# Patient Record
Sex: FEMALE | Race: WHITE | Employment: UNEMPLOYED | URBAN - NONMETROPOLITAN AREA
[De-identification: names, ages, dates, MRNs, and addresses within clinical notes are randomized per-mention and may not be internally consistent; named-entity substitution may affect disease eponyms.]

---
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
## 2021-06-02 ENCOUNTER — OFFICE VISIT (OUTPATIENT)
Dept: OBGYN | Age: 21
End: 2021-06-02
Payer: COMMERCIAL

## 2021-06-02 ENCOUNTER — HOSPITAL ENCOUNTER (OUTPATIENT)
Age: 21
Setting detail: SPECIMEN
Discharge: HOME | End: 2021-06-02
Payer: COMMERCIAL

## 2021-06-02 VITALS
DIASTOLIC BLOOD PRESSURE: 72 MMHG | TEMPERATURE: 76 F | SYSTOLIC BLOOD PRESSURE: 114 MMHG | BODY MASS INDEX: 26.46 KG/M2 | OXYGEN SATURATION: 98 % | HEIGHT: 65 IN | WEIGHT: 158.8 LBS

## 2021-06-02 DIAGNOSIS — Z30.9 ENCOUNTER FOR CONTRACEPTIVE MANAGEMENT, UNSPECIFIED TYPE: ICD-10-CM

## 2021-06-02 DIAGNOSIS — Z11.3 ROUTINE SCREENING FOR STI (SEXUALLY TRANSMITTED INFECTION): ICD-10-CM

## 2021-06-02 DIAGNOSIS — N92.0 MENORRHAGIA WITH REGULAR CYCLE: ICD-10-CM

## 2021-06-02 DIAGNOSIS — Z30.9 ENCOUNTER FOR CONTRACEPTIVE MANAGEMENT, UNSPECIFIED TYPE: Primary | ICD-10-CM

## 2021-06-02 DIAGNOSIS — N94.6 DYSMENORRHEA: ICD-10-CM

## 2021-06-02 LAB — HCG(URINE) PREGNANCY TEST: NEGATIVE

## 2021-06-02 PROCEDURE — 99395 PREV VISIT EST AGE 18-39: CPT

## 2021-06-02 PROCEDURE — 87491 CHLMYD TRACH DNA AMP PROBE: CPT

## 2021-06-02 PROCEDURE — 87591 N.GONORRHOEAE DNA AMP PROB: CPT

## 2021-06-02 PROCEDURE — 81025 URINE PREGNANCY TEST: CPT

## 2021-06-02 PROCEDURE — 99203 OFFICE O/P NEW LOW 30 MIN: CPT | Performed by: ADVANCED PRACTICE MIDWIFE

## 2021-06-02 PROCEDURE — 1036F TOBACCO NON-USER: CPT | Performed by: ADVANCED PRACTICE MIDWIFE

## 2021-06-02 PROCEDURE — G8419 CALC BMI OUT NRM PARAM NOF/U: HCPCS | Performed by: ADVANCED PRACTICE MIDWIFE

## 2021-06-02 PROCEDURE — G8427 DOCREV CUR MEDS BY ELIG CLIN: HCPCS | Performed by: ADVANCED PRACTICE MIDWIFE

## 2021-06-02 RX ORDER — DESOGESTREL AND ETHINYL ESTRADIOL 21-5 (28)
1 KIT ORAL DAILY
Qty: 1 PACKET | Refills: 3 | Status: SHIPPED
Start: 2021-06-02 | End: 2022-04-26 | Stop reason: ALTCHOICE

## 2021-06-02 ASSESSMENT — ENCOUNTER SYMPTOMS
EYES NEGATIVE: 1
GASTROINTESTINAL NEGATIVE: 1
RESPIRATORY NEGATIVE: 1

## 2021-06-02 ASSESSMENT — PATIENT HEALTH QUESTIONNAIRE - PHQ9
SUM OF ALL RESPONSES TO PHQ QUESTIONS 1-9: 0
SUM OF ALL RESPONSES TO PHQ9 QUESTIONS 1 & 2: 0
SUM OF ALL RESPONSES TO PHQ QUESTIONS 1-9: 0
2. FEELING DOWN, DEPRESSED OR HOPELESS: 0
SUM OF ALL RESPONSES TO PHQ QUESTIONS 1-9: 0
1. LITTLE INTEREST OR PLEASURE IN DOING THINGS: 0

## 2021-06-02 NOTE — PROGRESS NOTES
Well exam  Desires contraception for Heavy painful cycles  Rates pain at 5 in pelvis   Is sexually active with use of condoms 100 % of the time  Wants to discuss different types of OCP
place, and time. Deep Tendon Reflexes: Reflexes are normal and symmetric. Psychiatric:         Mood and Affect: Mood normal.          Assessment:      Diagnosis Orders   1. Encounter for contraceptive management, unspecified type  Pregnancy, Urine    desogestrel-ethinyl estradiol (KARIVA) 0.15-0.02/0.01 MG (21/5) per tablet   2. Menorrhagia with regular cycle     3. Dysmenorrhea     4. Routine screening for STI (sexually transmitted infection)  C.trachomatis N.gonorrhoeae DNA, Urine           Plan:     Orders Placed This Encounter   Procedures    C.trachomatis N.gonorrhoeae DNA, Urine     Standing Status:   Future     Standing Expiration Date:   6/2/2022    Pregnancy, Urine     Standing Status:   Future     Standing Expiration Date:   8/2/2021      Education: discussed options for menstrual cycle control and dysmenorrhea while also preventing pregnancy. She desires to start OCP's. Will trial low dose OCP's ds'd how to take the pill, missed and late pills and ACHES. Ds'd condom use to protect against STI. Start the pills the Sunday following onset of her next menses. RTO 3 months for PE and medication recheck She and her grandmother agree with plan.

## 2021-06-03 LAB
C. TRACHOMATIS DNA ,URINE: NEGATIVE
N. GONORRHOEAE DNA, URINE: NEGATIVE
SPECIMEN DESCRIPTION: NORMAL

## 2021-06-28 ENCOUNTER — HOSPITAL ENCOUNTER (OUTPATIENT)
Dept: LAB | Age: 21
Discharge: HOME | End: 2021-06-28
Payer: COMMERCIAL

## 2021-06-28 DIAGNOSIS — Z34.90 EARLY STAGE OF PREGNANCY: Primary | ICD-10-CM

## 2021-06-28 DIAGNOSIS — N92.6 MISSED MENSES: Primary | ICD-10-CM

## 2021-06-28 DIAGNOSIS — N92.6 MISSED MENSES: ICD-10-CM

## 2021-06-28 LAB — HCG QUANTITATIVE: ABNORMAL IU/L

## 2021-06-28 PROCEDURE — 36415 COLL VENOUS BLD VENIPUNCTURE: CPT

## 2021-06-28 PROCEDURE — 84702 CHORIONIC GONADOTROPIN TEST: CPT

## 2021-06-28 NOTE — RESULT ENCOUNTER NOTE
Please notify patient lab results WNL. Please schedule as initial and dating US prior to appt. Needs full bladder. .  Orders placed.  NOBLE/HELEN

## 2021-06-30 ENCOUNTER — HOSPITAL ENCOUNTER (OUTPATIENT)
Dept: LAB | Age: 21
Discharge: HOME | End: 2021-06-30
Payer: COMMERCIAL

## 2021-06-30 ENCOUNTER — HOSPITAL ENCOUNTER (OUTPATIENT)
Dept: ULTRASOUND IMAGING | Age: 21
Discharge: HOME | End: 2021-07-02
Payer: COMMERCIAL

## 2021-06-30 ENCOUNTER — INITIAL PRENATAL (OUTPATIENT)
Dept: OBGYN | Age: 21
End: 2021-06-30
Payer: COMMERCIAL

## 2021-06-30 VITALS
BODY MASS INDEX: 25.96 KG/M2 | DIASTOLIC BLOOD PRESSURE: 64 MMHG | HEART RATE: 84 BPM | SYSTOLIC BLOOD PRESSURE: 110 MMHG | WEIGHT: 157.2 LBS

## 2021-06-30 DIAGNOSIS — O24.019 PRE-EXISTING TYPE 1 DIABETES MELLITUS DURING PREGNANCY, ANTEPARTUM: ICD-10-CM

## 2021-06-30 DIAGNOSIS — O99.280 HYPOTHYROID IN PREGNANCY, ANTEPARTUM: ICD-10-CM

## 2021-06-30 DIAGNOSIS — E03.9 HYPOTHYROID IN PREGNANCY, ANTEPARTUM: ICD-10-CM

## 2021-06-30 DIAGNOSIS — Z34.90 EARLY STAGE OF PREGNANCY: Primary | ICD-10-CM

## 2021-06-30 DIAGNOSIS — Z3A.01 6 WEEKS GESTATION OF PREGNANCY: ICD-10-CM

## 2021-06-30 DIAGNOSIS — Z34.90 EARLY STAGE OF PREGNANCY: ICD-10-CM

## 2021-06-30 LAB
-: ABNORMAL
ABO/RH: NORMAL
ABSOLUTE EOS #: 0.04 K/UL (ref 0–0.44)
ABSOLUTE IMMATURE GRANULOCYTE: 0.03 K/UL (ref 0–0.3)
ABSOLUTE LYMPH #: 2.08 K/UL (ref 1.2–5.2)
ABSOLUTE MONO #: 0.57 K/UL (ref 0.1–1.4)
ALBUMIN SERPL-MCNC: 4.5 G/DL (ref 3.5–5.2)
ALBUMIN/GLOBULIN RATIO: 1.5 (ref 1–2.5)
ALP BLD-CCNC: 84 U/L (ref 35–104)
ALT SERPL-CCNC: 11 U/L (ref 5–33)
AMORPHOUS: ABNORMAL
AMPHETAMINE SCREEN URINE: NEGATIVE
ANION GAP SERPL CALCULATED.3IONS-SCNC: 10 MMOL/L (ref 9–17)
ANTIBODY SCREEN: NEGATIVE
AST SERPL-CCNC: 14 U/L
BACTERIA: ABNORMAL
BARBITURATE SCREEN URINE: NEGATIVE
BASOPHILS # BLD: 1 % (ref 0–2)
BASOPHILS ABSOLUTE: 0.04 K/UL (ref 0–0.2)
BENZODIAZEPINE SCREEN, URINE: NEGATIVE
BILIRUB SERPL-MCNC: 0.6 MG/DL (ref 0.3–1.2)
BILIRUBIN URINE: NEGATIVE
BUN BLDV-MCNC: 9 MG/DL (ref 6–20)
BUN/CREAT BLD: 20 (ref 9–20)
BUPRENORPHINE URINE: NEGATIVE
CALCIUM SERPL-MCNC: 9.4 MG/DL (ref 8.6–10.4)
CANNABINOID SCREEN URINE: POSITIVE
CASTS UA: ABNORMAL /LPF (ref 0–2)
CHLORIDE BLD-SCNC: 103 MMOL/L (ref 98–107)
CO2: 23 MMOL/L (ref 20–31)
COCAINE METABOLITE, URINE: NEGATIVE
COLOR: ABNORMAL
COMMENT UA: ABNORMAL
CREAT SERPL-MCNC: 0.45 MG/DL (ref 0.5–0.9)
CRYSTALS, UA: ABNORMAL /HPF
DIFFERENTIAL TYPE: ABNORMAL
EOSINOPHILS RELATIVE PERCENT: 1 % (ref 1–4)
EPITHELIAL CELLS UA: ABNORMAL /HPF (ref 0–5)
GFR AFRICAN AMERICAN: ABNORMAL ML/MIN
GFR NON-AFRICAN AMERICAN: ABNORMAL ML/MIN
GFR SERPL CREATININE-BSD FRML MDRD: ABNORMAL ML/MIN/{1.73_M2}
GFR SERPL CREATININE-BSD FRML MDRD: ABNORMAL ML/MIN/{1.73_M2}
GLUCOSE BLD-MCNC: 193 MG/DL (ref 70–99)
GLUCOSE URINE: NEGATIVE
HCT VFR BLD CALC: 43.3 % (ref 36.3–47.1)
HEMOGLOBIN: 14.7 G/DL (ref 11.9–15.1)
HEPATITIS B SURFACE ANTIGEN: NONREACTIVE
HIV AG/AB: NONREACTIVE
IMMATURE GRANULOCYTES: 0 %
KETONES, URINE: ABNORMAL
LEUKOCYTE ESTERASE, URINE: ABNORMAL
LYMPHOCYTES # BLD: 28 % (ref 25–45)
MCH RBC QN AUTO: 30.1 PG (ref 25.2–33.5)
MCHC RBC AUTO-ENTMCNC: 33.9 G/DL (ref 25.2–33.5)
MCV RBC AUTO: 88.5 FL (ref 82.6–102.9)
MDMA URINE: ABNORMAL
METHADONE SCREEN, URINE: NEGATIVE
METHAMPHETAMINE, URINE: NEGATIVE
MONOCYTES # BLD: 8 % (ref 2–8)
MUCUS: ABNORMAL
NITRITE, URINE: NEGATIVE
NRBC AUTOMATED: 0 PER 100 WBC
OPIATES, URINE: NEGATIVE
OTHER OBSERVATIONS UA: ABNORMAL
OXYCODONE SCREEN URINE: NEGATIVE
PDW BLD-RTO: 11.8 % (ref 11.8–14.4)
PH UA: 5.5 (ref 5–6)
PHENCYCLIDINE, URINE: NEGATIVE
PLATELET # BLD: 396 K/UL (ref 138–453)
PLATELET ESTIMATE: ABNORMAL
PMV BLD AUTO: 9.6 FL (ref 8.1–13.5)
POTASSIUM SERPL-SCNC: 4.1 MMOL/L (ref 3.7–5.3)
PROPOXYPHENE, URINE: NEGATIVE
PROTEIN UA: NEGATIVE
RBC # BLD: 4.89 M/UL (ref 3.95–5.11)
RBC # BLD: ABNORMAL 10*6/UL
RBC UA: ABNORMAL /HPF (ref 0–4)
RENAL EPITHELIAL, UA: ABNORMAL /HPF
RUBV IGG SER QL: 116.4 IU/ML
SEG NEUTROPHILS: 62 % (ref 34–64)
SEGMENTED NEUTROPHILS ABSOLUTE COUNT: 4.58 K/UL (ref 1.8–8)
SODIUM BLD-SCNC: 136 MMOL/L (ref 135–144)
SPECIFIC GRAVITY UA: 1.03 (ref 1.01–1.02)
T. PALLIDUM, IGG: NONREACTIVE
TEST INFORMATION: ABNORMAL
THYROXINE, FREE: 1.62 NG/DL (ref 0.93–1.7)
TOTAL PROTEIN: 7.5 G/DL (ref 6.4–8.3)
TRICHOMONAS: ABNORMAL
TRICYCLIC ANTIDEPRESSANTS, UR: NEGATIVE
TSH SERPL DL<=0.05 MIU/L-ACNC: 1.15 MIU/L (ref 0.3–5)
TURBIDITY: ABNORMAL
URINE HGB: NEGATIVE
UROBILINOGEN, URINE: NORMAL
WBC # BLD: 7.3 K/UL (ref 4.5–13.5)
WBC # BLD: ABNORMAL 10*3/UL
WBC UA: ABNORMAL /HPF (ref 0–4)
YEAST: ABNORMAL

## 2021-06-30 PROCEDURE — 86762 RUBELLA ANTIBODY: CPT

## 2021-06-30 PROCEDURE — 3046F HEMOGLOBIN A1C LEVEL >9.0%: CPT | Performed by: ADVANCED PRACTICE MIDWIFE

## 2021-06-30 PROCEDURE — 87086 URINE CULTURE/COLONY COUNT: CPT

## 2021-06-30 PROCEDURE — 87389 HIV-1 AG W/HIV-1&-2 AB AG IA: CPT

## 2021-06-30 PROCEDURE — 84439 ASSAY OF FREE THYROXINE: CPT

## 2021-06-30 PROCEDURE — 99213 OFFICE O/P EST LOW 20 MIN: CPT | Performed by: ADVANCED PRACTICE MIDWIFE

## 2021-06-30 PROCEDURE — 87591 N.GONORRHOEAE DNA AMP PROB: CPT

## 2021-06-30 PROCEDURE — 81001 URINALYSIS AUTO W/SCOPE: CPT

## 2021-06-30 PROCEDURE — 84443 ASSAY THYROID STIM HORMONE: CPT

## 2021-06-30 PROCEDURE — 86850 RBC ANTIBODY SCREEN: CPT

## 2021-06-30 PROCEDURE — 80306 DRUG TEST PRSMV INSTRMNT: CPT

## 2021-06-30 PROCEDURE — 87340 HEPATITIS B SURFACE AG IA: CPT

## 2021-06-30 PROCEDURE — 80053 COMPREHEN METABOLIC PANEL: CPT

## 2021-06-30 PROCEDURE — 86900 BLOOD TYPING SEROLOGIC ABO: CPT

## 2021-06-30 PROCEDURE — 99203 OFFICE O/P NEW LOW 30 MIN: CPT | Performed by: ADVANCED PRACTICE MIDWIFE

## 2021-06-30 PROCEDURE — 87491 CHLMYD TRACH DNA AMP PROBE: CPT

## 2021-06-30 PROCEDURE — G8427 DOCREV CUR MEDS BY ELIG CLIN: HCPCS | Performed by: ADVANCED PRACTICE MIDWIFE

## 2021-06-30 PROCEDURE — 76801 OB US < 14 WKS SINGLE FETUS: CPT

## 2021-06-30 PROCEDURE — 2022F DILAT RTA XM EVC RTNOPTHY: CPT | Performed by: ADVANCED PRACTICE MIDWIFE

## 2021-06-30 PROCEDURE — 83036 HEMOGLOBIN GLYCOSYLATED A1C: CPT

## 2021-06-30 PROCEDURE — 86901 BLOOD TYPING SEROLOGIC RH(D): CPT

## 2021-06-30 PROCEDURE — 36415 COLL VENOUS BLD VENIPUNCTURE: CPT

## 2021-06-30 PROCEDURE — 76817 TRANSVAGINAL US OBSTETRIC: CPT

## 2021-06-30 PROCEDURE — 1036F TOBACCO NON-USER: CPT | Performed by: ADVANCED PRACTICE MIDWIFE

## 2021-06-30 PROCEDURE — 86780 TREPONEMA PALLIDUM: CPT

## 2021-06-30 PROCEDURE — G8419 CALC BMI OUT NRM PARAM NOF/U: HCPCS | Performed by: ADVANCED PRACTICE MIDWIFE

## 2021-06-30 PROCEDURE — 85025 COMPLETE CBC W/AUTO DIFF WBC: CPT

## 2021-06-30 PROCEDURE — 86787 VARICELLA-ZOSTER ANTIBODY: CPT

## 2021-06-30 NOTE — PROGRESS NOTES
S:  Presents for prenatal visit today w/FOB. Reports was going to start OCP's and period never started. Unplanned pregnancy, but okay with it. O:  MVSS, Afebrile. Abodmen gravid, US = 6 Weeks 1 Day = EDC 2021, . A:  23 yo  at Early Stage of Pregnancy, Pre-Existing DM I, +FHT's  P:  Education: will obtain labs, encouraged eye exam, educational pamphlet given, RTO 2-3 weeks will do education, PE and MFM referral at that time. Currently seeing endocrinology at [facility].  Checking FBS and Q3H during daytime. Thirty-five minutes spent in interview and education.

## 2021-06-30 NOTE — LETTER
[address] OB GYN A department of [place] [address]  Phone: [phone]  Fax: MARIA D Mead CNM        June 30, 2021      To Whom It May Concern,    Andre Dixon is currently 6 weeks and 4 days pregnant. Andre's due date is 2/19/2022.        Sincerely,        MARIA D Roy CNM

## 2021-07-01 LAB
C. TRACHOMATIS DNA ,URINE: NEGATIVE
ESTIMATED AVERAGE GLUCOSE: 189 MG/DL
HBA1C MFR BLD: 8.2 % (ref 4–6)
N. GONORRHOEAE DNA, URINE: NEGATIVE
SPECIMEN DESCRIPTION: NORMAL

## 2021-07-02 LAB
CULTURE: NORMAL
Lab: NORMAL
SPECIMEN DESCRIPTION: NORMAL
VZV IGG SER QL IA: 1.67

## 2021-07-27 ENCOUNTER — ROUTINE PRENATAL (OUTPATIENT)
Dept: OBGYN | Age: 21
End: 2021-07-27
Payer: COMMERCIAL

## 2021-07-27 ENCOUNTER — HOSPITAL ENCOUNTER (OUTPATIENT)
Dept: ULTRASOUND IMAGING | Age: 21
Discharge: HOME | End: 2021-07-29
Payer: COMMERCIAL

## 2021-07-27 VITALS
DIASTOLIC BLOOD PRESSURE: 62 MMHG | WEIGHT: 156 LBS | SYSTOLIC BLOOD PRESSURE: 104 MMHG | BODY MASS INDEX: 25.76 KG/M2 | HEART RATE: 80 BPM

## 2021-07-27 DIAGNOSIS — Z34.90 PRENATAL CARE, ANTEPARTUM: Primary | ICD-10-CM

## 2021-07-27 DIAGNOSIS — O24.019 PRE-EXISTING TYPE 1 DIABETES MELLITUS DURING PREGNANCY, ANTEPARTUM: ICD-10-CM

## 2021-07-27 DIAGNOSIS — Z34.90 EARLY STAGE OF PREGNANCY: ICD-10-CM

## 2021-07-27 DIAGNOSIS — Z3A.10 10 WEEKS GESTATION OF PREGNANCY: ICD-10-CM

## 2021-07-27 PROCEDURE — 76815 OB US LIMITED FETUS(S): CPT

## 2021-07-27 PROCEDURE — G8427 DOCREV CUR MEDS BY ELIG CLIN: HCPCS | Performed by: ADVANCED PRACTICE MIDWIFE

## 2021-07-27 PROCEDURE — 3052F HG A1C>EQUAL 8.0%<EQUAL 9.0%: CPT | Performed by: ADVANCED PRACTICE MIDWIFE

## 2021-07-27 PROCEDURE — 1036F TOBACCO NON-USER: CPT | Performed by: ADVANCED PRACTICE MIDWIFE

## 2021-07-27 PROCEDURE — G8419 CALC BMI OUT NRM PARAM NOF/U: HCPCS | Performed by: ADVANCED PRACTICE MIDWIFE

## 2021-07-27 PROCEDURE — 99213 OFFICE O/P EST LOW 20 MIN: CPT | Performed by: ADVANCED PRACTICE MIDWIFE

## 2021-07-27 PROCEDURE — 2022F DILAT RTA XM EVC RTNOPTHY: CPT | Performed by: ADVANCED PRACTICE MIDWIFE

## 2021-07-27 PROCEDURE — 99214 OFFICE O/P EST MOD 30 MIN: CPT | Performed by: ADVANCED PRACTICE MIDWIFE

## 2021-07-27 RX ORDER — FAMOTIDINE 20 MG
1 TABLET ORAL DAILY
Qty: 30 TABLET | Refills: 12 | Status: SHIPPED | OUTPATIENT
Start: 2021-07-27 | End: 2022-03-22 | Stop reason: ALTCHOICE

## 2021-07-27 NOTE — PROGRESS NOTES
S:  Presents for dating US. Reports morning sickness, some vomiting. She and her mother state that she has an appt. w/MFM at [address] this Thursday. O:  MVSS, Afebrile. Abdomen gravid, nontender. US = 10 Weeks 3 Days = EDCC 2022 CWD, . PE Deferred  A:  23 yo  at 10 Weeks 3 Days sIUP, Pre-Existing DM I, +FHT's  P:  Education: discussed complete transfer of care, keep appt. this upcoming Thursday, PNV presription sent in and referral for eye exam. Referral for MFM placed. Twenty minutes spent in discussion of plan of care and review of [address].

## 2021-08-31 ENCOUNTER — OFFICE VISIT (OUTPATIENT)
Dept: OPTOMETRY | Age: 21
End: 2021-08-31
Payer: COMMERCIAL

## 2021-08-31 DIAGNOSIS — H52.203 HYPEROPIA OF BOTH EYES WITH ASTIGMATISM: ICD-10-CM

## 2021-08-31 DIAGNOSIS — H52.03 HYPEROPIA OF BOTH EYES WITH ASTIGMATISM: ICD-10-CM

## 2021-08-31 DIAGNOSIS — H35.9 MACULOPATHY: ICD-10-CM

## 2021-08-31 DIAGNOSIS — E10.69 TYPE 1 DIABETES MELLITUS WITH OTHER SPECIFIED COMPLICATION (HCC): Primary | ICD-10-CM

## 2021-08-31 PROCEDURE — 2022F DILAT RTA XM EVC RTNOPTHY: CPT | Performed by: OPTOMETRIST

## 2021-08-31 PROCEDURE — 92014 COMPRE OPH EXAM EST PT 1/>: CPT | Performed by: OPTOMETRIST

## 2021-08-31 PROCEDURE — G8419 CALC BMI OUT NRM PARAM NOF/U: HCPCS | Performed by: OPTOMETRIST

## 2021-08-31 PROCEDURE — 92015 DETERMINE REFRACTIVE STATE: CPT | Performed by: OPTOMETRIST

## 2021-08-31 PROCEDURE — G8427 DOCREV CUR MEDS BY ELIG CLIN: HCPCS | Performed by: OPTOMETRIST

## 2021-08-31 PROCEDURE — 92250 FUNDUS PHOTOGRAPHY W/I&R: CPT | Performed by: OPTOMETRIST

## 2021-08-31 PROCEDURE — 1036F TOBACCO NON-USER: CPT | Performed by: OPTOMETRIST

## 2021-08-31 RX ORDER — TROPICAMIDE 10 MG/ML
1 SOLUTION/ DROPS OPHTHALMIC ONCE
Status: DISCONTINUED | OUTPATIENT
Start: 2021-08-31 | End: 2021-08-31

## 2021-08-31 ASSESSMENT — VISUAL ACUITY
OS_CC: 20/25
CORRECTION_TYPE: GLASSES
METHOD: SNELLEN - LINEAR

## 2021-08-31 ASSESSMENT — TONOMETRY
IOP_METHOD: NON-CONTACT AIR PUFF
OD_IOP_MMHG: 10
OS_IOP_MMHG: 13

## 2021-08-31 ASSESSMENT — REFRACTION_WEARINGRX
OD_SPHERE: +2.00
OS_AXIS: 180
SPECS_TYPE: SVL
OS_SPHERE: +2.25
OD_CYLINDER: -1.00
OS_CYLINDER: -2.25
OD_AXIS: 007

## 2021-08-31 ASSESSMENT — ENCOUNTER SYMPTOMS
GASTROINTESTINAL NEGATIVE: 0
EYES NEGATIVE: 0
RESPIRATORY NEGATIVE: 0
ALLERGIC/IMMUNOLOGIC NEGATIVE: 0

## 2021-08-31 ASSESSMENT — KERATOMETRY
OD_AXISANGLE_DEGREES: 095
METHOD_AUTO_MANUAL: AUTOMATED
OS_K2POWER_DIOPTERS: 43.50
OD_K2POWER_DIOPTERS: 42.75
OS_AXISANGLE2_DEGREES: 002
OD_AXISANGLE2_DEGREES: 005
OS_AXISANGLE_DEGREES: 092
OD_K1POWER_DIOPTERS: 40.75
OS_K1POWER_DIOPTERS: 40.25

## 2021-08-31 ASSESSMENT — REFRACTION_MANIFEST
OD_CYLINDER: -1.00
OS_CYLINDER: -2.50
OS_AXIS: 001
OD_SPHERE: +1.75
OS_SPHERE: +2.25
OD_AXIS: 002

## 2021-08-31 ASSESSMENT — SLIT LAMP EXAM - LIDS
COMMENTS: NORMAL
COMMENTS: NORMAL

## 2021-08-31 NOTE — PROGRESS NOTES
Kilo Lagos presents today for   Chief Complaint   Patient presents with    Blurred Vision    Ophth Diabetic Exam    Vision Exam   .    HPI     Blurred Vision     In both eyes. Vision is blurred. Context:  distance vision.               Comments     Last Vision Exam: 7/6/2020 Aw  Last Ophthalmology Exam: n/a  Last Filled Glasses Rx: 7/6/2020  Insurance: [employer]  Update: Dm Exam; Glasses   Distance is getting more blurry  Currently 15 weeks pregnant  Diabetic:  Sugars: 262 this am   HmgA1C: 8.2  7/1/2021                   Current Outpatient Medications   Medication Sig Dispense Refill    Prenatal Vit-Fe Fumarate-FA (PRENATAL COMPLETE) 14-0.4 MG TABS Take 1 tablet by mouth daily 30 tablet 12    levothyroxine (SYNTHROID) 100 MCG tablet Take 100 mcg by mouth Daily      BASAGLAR KWIKPEN 100 UNIT/ML injection pen Inject 53 Units into the skin nightly Uses 58 units nightly during menses      B-D INS SYR ULTRAFINE 1CC/31G 31G X 5/16\" 1 ML MISC       TRUETEST TEST strip TEST SIX TO EIGHT TIMES DAILY 200 each 0    acetone, urine, test (KETOSTIX) strip Test daily as needed for ketones 20 strip 0    cetirizine (ZYRTEC) 10 MG tablet TAKE ONE TABLET BY MOUTH EVERY DAY 30 tablet 6    Insulin Lispro, Human, (HUMALOG KWIKPEN) 100 UNIT/ML SOPN As directed for 3-4 injections/day up to 40 units daily (Patient taking differently: Sliding scale as directed for 3-4 injections/day up to 40 units daily) 5 Pen 6    Insulin Pen Needle (B-D ULTRAFINE III SHORT PEN) 31G X 8 MM MISC Sig:Use as directed for injections 4 - 6 times per day 200 each 6    ULTRA-COMFORT INSULIN SYRINGE 31G X 5/16\" 0.5 ML MISC USE ONCE DAILY WITH LANTUS 30 each 3    glucagon (GLUCAGON EMERGENCY) 1 MG injection As directed for extreme hypoglycemia 1 kit 2    Lancets (BD LANCET ULTRAFINE 30G) MISC Disp: # 200  Sig: For blood testing 4-6 times/day 200 each 11    desogestrel-ethinyl estradiol (KARIVA) 0.15-0.02/0.01 MG (21/5) per tablet Take 1 tablet by mouth daily (Patient not taking: Reported on 6/30/2021) 1 packet 3     No current facility-administered medications for this visit. ROS     Positive for: Endocrine    Negative for: Constitutional, Gastrointestinal, Neurological, Skin, Genitourinary, Musculoskeletal, HENT, Cardiovascular, Eyes, Respiratory, Psychiatric, Allergic/Imm, Heme/Lymph          Family History   Problem Relation Age of Onset    Diabetes Maternal Grandfather     Retinal Detachment Maternal Grandfather     Diabetes Father         borderline    Neuropathy Father     Heart Disease Paternal Grandmother     Asthma Paternal Grandmother     Other Paternal Grandmother         edema and history of hemophilia    Cancer Maternal Grandmother 72        breast cancer    Hypertension Other         paternal side    Diabetes Other         paternal side     Glaucoma Neg Hx     Cataracts Neg Hx      Social History     Socioeconomic History    Marital status: Single     Spouse name: None    Number of children: None    Years of education: None    Highest education level: None   Occupational History     Employer: N/A   Tobacco Use    Smoking status: Passive Smoke Exposure - Never Smoker    Smokeless tobacco: Never Used    Tobacco comment: Passive smoke exposure. Substance and Sexual Activity    Alcohol use: No     Alcohol/week: 0.0 standard drinks    Drug use: Yes     Types: Marijuana     Comment: last use 6/26, stopped    Sexual activity: Yes     Partners: Male     Birth control/protection: Pill, Condom   Other Topics Concern    None   Social History Narrative    ** Merged History Encounter **          Social Determinants of Health     Financial Resource Strain:     Difficulty of Paying Living Expenses:    Food Insecurity:     Worried About Running Out of Food in the Last Year:     Ran Out of Food in the Last Year:    Transportation Needs:     Lack of Transportation (Medical):      Lack of Transportation (Non-Medical): K1 Axis K2 Axis    Right 40.75 005 42.75 095    Left 40.25 002 43.50 092              Pupils     Pupils       Pupils    Right PERRL    Left PERRL               Not recorded         Not recorded          Ophthalmology Exam     Wearing Rx       Sphere Cylinder Axis    Right +2.00 -1.00 007    Left +2.25 -2.25 180    Age: 2yr    Type: SVL                Manifest Refraction     Manifest Refraction       Sphere Cylinder Axis Dist VA    Right +1.75 -1.00 002 20/20    Left +2.25 -2.50 001 20/20          Manifest Refraction #2 (Auto)       Sphere Cylinder Axis Dist VA    Right +1.75 -1.50 001     Left +2.50 -2.50 003                Final Rx       Sphere Cylinder Axis    Right +1.75 -1.00 002    Left +2.25 -2.50 001    Type: SVL    Expiration Date: 9/1/2023          Neuro/Psych     Neuro/Psych     Oriented x3: Yes    Mood/Affect: Normal                Orders Placed This Encounter   Procedures    HM DIABETES EYE EXAM    Color Fundus Photography-OU-Both Eyes       IMPRESSION:  1. Type 1 diabetes mellitus with other specified complication ([address]    2. Hyperopia of both eyes with astigmatism    3. Maculopathy        PLAN:    Discussed the patient's diagnosis of diabetes and the impact this can have on their ocular health, potentially even leading to permanent blindness. I discussed with the patient the importance of continued follow-up and management with their primary care physician to control their glycemic, blood pressure, and lipid levels. The patient verbalized understanding. 2. New glasses recommended   3. Recheck optos in one month to check for any changes as the left eye shows some exudate near NAVJOT       Glycemic control as per PCP   Patient Instructions   Keep blood sugar level under control and we will check the retina in one month      Return in about 1 month (around 9/30/2021) for recheck maculae due to change in optos (repeat optos and oct if needed) do not dilate .

## 2021-09-10 ENCOUNTER — OFFICE VISIT (OUTPATIENT)
Dept: FAMILY MEDICINE CLINIC | Age: 21
End: 2021-09-10
Payer: COMMERCIAL

## 2021-09-10 VITALS
HEART RATE: 96 BPM | RESPIRATION RATE: 12 BRPM | WEIGHT: 159.9 LBS | HEIGHT: 65 IN | BODY MASS INDEX: 26.64 KG/M2 | DIASTOLIC BLOOD PRESSURE: 62 MMHG | TEMPERATURE: 96.9 F | OXYGEN SATURATION: 98 % | SYSTOLIC BLOOD PRESSURE: 104 MMHG

## 2021-09-10 DIAGNOSIS — Z3A.16 16 WEEKS GESTATION OF PREGNANCY: ICD-10-CM

## 2021-09-10 DIAGNOSIS — Z00.00 ROUTINE GENERAL MEDICAL EXAMINATION AT A HEALTH CARE FACILITY: Primary | ICD-10-CM

## 2021-09-10 DIAGNOSIS — E10.9 TYPE 1 DIABETES MELLITUS WITHOUT COMPLICATION (HCC): ICD-10-CM

## 2021-09-10 PROCEDURE — 99395 PREV VISIT EST AGE 18-39: CPT | Performed by: FAMILY MEDICINE

## 2021-09-10 RX ORDER — ASPIRIN 81 MG/1
81 TABLET, CHEWABLE ORAL DAILY
COMMUNITY
End: 2022-02-28

## 2021-09-10 SDOH — ECONOMIC STABILITY: FOOD INSECURITY: WITHIN THE PAST 12 MONTHS, THE FOOD YOU BOUGHT JUST DIDN'T LAST AND YOU DIDN'T HAVE MONEY TO GET MORE.: NEVER TRUE

## 2021-09-10 SDOH — ECONOMIC STABILITY: FOOD INSECURITY: WITHIN THE PAST 12 MONTHS, YOU WORRIED THAT YOUR FOOD WOULD RUN OUT BEFORE YOU GOT MONEY TO BUY MORE.: NEVER TRUE

## 2021-09-10 ASSESSMENT — ENCOUNTER SYMPTOMS
CONSTIPATION: 0
SORE THROAT: 0
SHORTNESS OF BREATH: 0
WHEEZING: 0
COUGH: 0
DIARRHEA: 0
VOMITING: 0
ABDOMINAL PAIN: 0
TROUBLE SWALLOWING: 0
EYE REDNESS: 0
SINUS PRESSURE: 0
EYE DISCHARGE: 0
RHINORRHEA: 0
NAUSEA: 0

## 2021-09-10 ASSESSMENT — SOCIAL DETERMINANTS OF HEALTH (SDOH): HOW HARD IS IT FOR YOU TO PAY FOR THE VERY BASICS LIKE FOOD, HOUSING, MEDICAL CARE, AND HEATING?: NOT HARD AT ALL

## 2021-09-10 NOTE — PROGRESS NOTES
9/10/2021     Andre Diaz (:  2000) is a 24 y.o. female, here for evaluation of the following medical concerns:    HPI  Patient comes in today for routine general physical exam.  Patient is 16 weeks pregnant. Is working with the OB/GYN and maternal-fetal medicine specialist regarding management of her diabetes during pregnancy. Blood sugars have been fluctuating quite a bit but this has been pretty common throughout most of her life. She has had to increase her insulin quite a bit since being pregnant. Will likely be admitted tomorrow to try to stabilize her blood sugar control. She feels relatively good otherwise. Is having some nausea. Does try to eat relatively healthy. Does not have any other acute medical concerns to discuss. Did review patient's med list, allergies, social history, fam history, pmhx and pshx today as noted in the record. Review of Systems   Constitutional: Negative for chills, fatigue and fever. HENT: Negative for congestion, ear pain, postnasal drip, rhinorrhea, sinus pressure, sore throat and trouble swallowing. Eyes: Negative for discharge and redness. Respiratory: Negative for cough, shortness of breath and wheezing. Cardiovascular: Negative for chest pain. Gastrointestinal: Negative for abdominal pain, constipation, diarrhea, nausea and vomiting. Genitourinary: Negative for dysuria, flank pain, frequency and urgency. Musculoskeletal: Negative for arthralgias, myalgias and neck pain. Skin: Negative for rash and wound. Allergic/Immunologic: Negative for environmental allergies. Neurological: Negative for dizziness, weakness, light-headedness and headaches. Hematological: Negative for adenopathy. Psychiatric/Behavioral: Negative. Prior to Visit Medications    Medication Sig Taking?  Authorizing Provider   aspirin 81 MG chewable tablet Take 81 mg by mouth daily Yes Historical Provider, MD   Prenatal Vit-Fe Fumarate-FA (PRENATAL COMPLETE) 14-0.4 MG TABS Take 1 tablet by mouth daily Yes MARIA D Nielson CNM   levothyroxine (SYNTHROID) 100 MCG tablet Take 100 mcg by mouth Daily Yes Historical Provider, MD Donis Flores 100 UNIT/ML injection pen Inject 53 Units into the skin nightly Uses 58 units nightly during menses Yes Historical Provider, MD KENNEDY INS SYR ULTRAFINE 1CC/31G 31G X 5/16\" 1 ML MISC  Yes Historical Provider, MD   TRUETEST TEST strip TEST SIX TO EIGHT TIMES DAILY Yes Aníbal Bergman MD   acetone, urine, test (KETOSTIX) strip Test daily as needed for ketones Yes Marycruz Shirley MD   cetirizine (ZYRTEC) 10 MG tablet TAKE ONE TABLET BY MOUTH EVERY DAY Yes EILEEN Danielle   Insulin Lispro, Human, (HUMALOG KWIKPEN) 100 UNIT/ML SOPN As directed for 3-4 injections/day up to 40 units daily  Patient taking differently: Sliding scale as directed for 3-4 injections/day up to 40 units daily Yes Eloise NEWBY MD   Insulin Pen Needle (GADIEL-LORRAINE ULTRAFINE III SHORT PEN) 31G X 8 MM MISC Sig:Use as directed for injections 4 - 6 times per day Yes Celia Watts MD   ULTRA-COMFORT INSULIN SYRINGE 31G X 5/16\" 0.5 ML MISC USE ONCE DAILY WITH LANTUS Yes Jayson Payan MD   glucagon (GLUCAGON EMERGENCY) 1 MG injection As directed for extreme hypoglycemia Yes Jayson Payan MD   Lancets (BD LANCET ULTRAFINE 30G) MISC Disp: # 200  Sig: For blood testing 4-6 times/day Yes Jayson Payan MD   desogestrel-ethinyl estradiol ([place]) 0.15-0.02/0.01 MG (21/5) per tablet Take 1 tablet by mouth daily  Patient not taking: Reported on 6/30/2021  MARIA D Nielson CNM        Social History     Tobacco Use    Smoking status: Passive Smoke Exposure - Never Smoker    Smokeless tobacco: Never Used    Tobacco comment: Passive smoke exposure.    Substance Use Topics    Alcohol use: No     Alcohol/week: 0.0 standard drinks        Vitals:    09/10/21 1517   BP: 104/62   Site: Left Upper Arm   Position: Sitting   Cuff Size: Medium Adult Pulse: 96   Resp: 12   Temp: 96.9 °F (36.1 °C)   TempSrc: Temporal   SpO2: 98%   Weight: 159 lb 14.4 oz (72.5 kg)   Height: 5' 5.25\" (1.657 m)     Estimated body mass index is 26.41 kg/m² as calculated from the following:    Height as of this encounter: 5' 5.25\" (1.657 m). Weight as of this encounter: 159 lb 14.4 oz (72.5 kg). Physical Exam  Vitals and nursing note reviewed. Constitutional:       General: She is not in acute distress. Appearance: Normal appearance. She is well-developed. She is not diaphoretic. HENT:      Head: Normocephalic and atraumatic. Right Ear: Tympanic membrane, ear canal and external ear normal.      Left Ear: Tympanic membrane, ear canal and external ear normal.      Nose: Nose normal.      Mouth/Throat:      Mouth: Mucous membranes are moist.      Pharynx: Oropharynx is clear. No oropharyngeal exudate. Eyes:      General:         Right eye: No discharge. Left eye: No discharge. Conjunctiva/sclera: Conjunctivae normal.      Pupils: Pupils are equal, round, and reactive to light. Neck:      Thyroid: No thyromegaly. Cardiovascular:      Rate and Rhythm: Normal rate and regular rhythm. Heart sounds: Normal heart sounds. Pulmonary:      Effort: Pulmonary effort is normal.      Breath sounds: Normal breath sounds. No wheezing or rales. Abdominal:      General: Bowel sounds are normal. There is distension (gravid uterus). Palpations: Abdomen is soft. Tenderness: There is no abdominal tenderness. Musculoskeletal:      Cervical back: Normal range of motion and neck supple. Lymphadenopathy:      Cervical: No cervical adenopathy. Skin:     General: Skin is warm and dry. Findings: No rash. Neurological:      Mental Status: She is alert and oriented to person, place, and time. Psychiatric:         Behavior: Behavior normal.         Thought Content:  Thought content normal.         Judgment: Judgment normal. ASSESSMENT/PLAN:  Encounter Diagnoses   Name Primary?  Routine general medical examination at a health care facility Yes    Type 1 diabetes mellitus without complication ([address]     16 weeks gestation of pregnancy      No orders of the defined types were placed in this encounter. No orders of the defined types were placed in this encounter. Patient is to continue with healthy dietary intake and stay physically active for optimal health. Patient is to continue to follow with OB/GYN and endocrinology as well as maternal-fetal medicine for management of her diabetes during her pregnancy. Patient is to return to my office annually for routine general physical exam or sooner if any further problems or symptoms arise. (Please note that portions of this note were completed with a voice recognition program. Efforts were made to edit the dictations but occasionally words are mis-transcribed.)        Return in about 1 year (around 9/10/2022). An electronic signature was used to authenticate this note.     --Samson Torres, DO on 9/10/2021 at 4:02 PM

## 2021-09-22 ENCOUNTER — HOSPITAL ENCOUNTER (OUTPATIENT)
Dept: LAB | Age: 21
Discharge: HOME | End: 2021-09-22
Payer: COMMERCIAL

## 2021-09-22 ENCOUNTER — HOSPITAL ENCOUNTER (OUTPATIENT)
Dept: NON INVASIVE DIAGNOSTICS | Age: 21
Discharge: HOME | End: 2021-09-22
Payer: COMMERCIAL

## 2021-09-22 LAB
CREATININE URINE: 132.8 MG/DL (ref 28–217)
CREATININE URINE: 135.4 MG/DL (ref 28–217)
EKG ATRIAL RATE: 93 BPM
EKG P AXIS: 64 DEGREES
EKG P-R INTERVAL: 154 MS
EKG Q-T INTERVAL: 340 MS
EKG QRS DURATION: 76 MS
EKG QTC CALCULATION (BAZETT): 422 MS
EKG R AXIS: 87 DEGREES
EKG T AXIS: 54 DEGREES
EKG VENTRICULAR RATE: 93 BPM
MICROALBUMIN/CREAT 24H UR: <12 MG/L
MICROALBUMIN/CREAT UR-RTO: NORMAL MCG/MG CREAT
TOTAL PROTEIN, URINE: 12 MG/DL
URINE TOTAL PROTEIN CREATININE RATIO: 0.09 (ref 0–0.2)

## 2021-09-22 PROCEDURE — 84156 ASSAY OF PROTEIN URINE: CPT

## 2021-09-22 PROCEDURE — 82043 UR ALBUMIN QUANTITATIVE: CPT

## 2021-09-22 PROCEDURE — 82570 ASSAY OF URINE CREATININE: CPT

## 2021-09-22 PROCEDURE — 36415 COLL VENOUS BLD VENIPUNCTURE: CPT

## 2021-09-22 PROCEDURE — 82105 ALPHA-FETOPROTEIN SERUM: CPT

## 2021-09-22 PROCEDURE — 93005 ELECTROCARDIOGRAM TRACING: CPT | Performed by: OBSTETRICS & GYNECOLOGY

## 2021-09-25 LAB
AFP INTERPRETATION: NORMAL
AFP MOM: 1.07
AFP SPECIMEN: NORMAL
AFP: 40 NG/ML
DATE OF BIRTH: NORMAL
DATING METHOD: NORMAL
DETERMINED BY: NORMAL
DIABETIC: POSITIVE
DONOR EGG?: NORMAL
DUE DATE: NORMAL
ESTIMATED DUE DATE: NORMAL
FAMILY HISTORY NTD: NEGATIVE
GESTATIONAL AGE: NORMAL
IN VITRO FERTILIZATION: NORMAL
INSULIN REQ DIABETES: YES
LAST MENSTRUAL PERIOD: NORMAL
MATERNAL AGE AT EDD: 20.3 YR
MATERNAL WEIGHT: 159
MONOCHORIONIC TWINS: NORMAL
NUMBER OF FETUSES: NORMAL
PATIENT WEIGHT UNITS: NORMAL
PATIENT WEIGHT: NORMAL
RACE (MATERNAL): NORMAL
RACE: NORMAL
REPEAT SPECIMEN?: NORMAL
SMOKING: NORMAL
SMOKING: NORMAL
VALPROIC/CARBAMAZEP: NORMAL
ZZ NTE CLEAN UP: HISTORY: NO

## 2021-10-04 ENCOUNTER — OFFICE VISIT (OUTPATIENT)
Dept: OPTOMETRY | Age: 21
End: 2021-10-04
Payer: COMMERCIAL

## 2021-10-04 DIAGNOSIS — H53.8 BLURRED VISION, BILATERAL: ICD-10-CM

## 2021-10-04 PROCEDURE — 2022F DILAT RTA XM EVC RTNOPTHY: CPT | Performed by: OPTOMETRIST

## 2021-10-04 PROCEDURE — 1036F TOBACCO NON-USER: CPT | Performed by: OPTOMETRIST

## 2021-10-04 PROCEDURE — 3052F HG A1C>EQUAL 8.0%<EQUAL 9.0%: CPT | Performed by: OPTOMETRIST

## 2021-10-04 PROCEDURE — 99213 OFFICE O/P EST LOW 20 MIN: CPT | Performed by: OPTOMETRIST

## 2021-10-04 PROCEDURE — 92250 FUNDUS PHOTOGRAPHY W/I&R: CPT | Performed by: OPTOMETRIST

## 2021-10-04 PROCEDURE — G8484 FLU IMMUNIZE NO ADMIN: HCPCS | Performed by: OPTOMETRIST

## 2021-10-04 PROCEDURE — G8427 DOCREV CUR MEDS BY ELIG CLIN: HCPCS | Performed by: OPTOMETRIST

## 2021-10-04 PROCEDURE — G8419 CALC BMI OUT NRM PARAM NOF/U: HCPCS | Performed by: OPTOMETRIST

## 2021-10-04 RX ORDER — TROPICAMIDE 10 MG/ML
1 SOLUTION/ DROPS OPHTHALMIC ONCE
Status: COMPLETED | OUTPATIENT
Start: 2021-10-04 | End: 2021-10-04

## 2021-10-04 RX ADMIN — TROPICAMIDE 1 DROP: 10 SOLUTION/ DROPS OPHTHALMIC at 16:11

## 2021-10-04 ASSESSMENT — VISUAL ACUITY
METHOD: SNELLEN - LINEAR
CORRECTION_TYPE: GLASSES
OS_CC: 20/20

## 2021-10-04 ASSESSMENT — ENCOUNTER SYMPTOMS
GASTROINTESTINAL NEGATIVE: 0
ALLERGIC/IMMUNOLOGIC NEGATIVE: 0
RESPIRATORY NEGATIVE: 0
EYES NEGATIVE: 0

## 2021-10-04 NOTE — PROGRESS NOTES
Valentina Carpio presents today for   Chief Complaint   Patient presents with    1 Month Follow-Up   . HPI     1 month follow up diabetic retinopathy             Current Outpatient Medications   Medication Sig Dispense Refill    aspirin 81 MG chewable tablet Take 81 mg by mouth daily      Prenatal Vit-Fe Fumarate-FA (PRENATAL COMPLETE) 14-0.4 MG TABS Take 1 tablet by mouth daily 30 tablet 12    desogestrel-ethinyl estradiol (KARIVA) 0.15-0.02/0.01 MG (21/5) per tablet Take 1 tablet by mouth daily (Patient not taking: Reported on 6/30/2021) 1 packet 3    levothyroxine (SYNTHROID) 100 MCG tablet Take 100 mcg by mouth Daily      BASAGLAR KWIKPEN 100 UNIT/ML injection pen Inject 53 Units into the skin nightly Uses 58 units nightly during menses      B-D INS SYR ULTRAFINE 1CC/31G 31G X 5/16\" 1 ML MISC       TRUETEST TEST strip TEST SIX TO EIGHT TIMES DAILY 200 each 0    acetone, urine, test (KETOSTIX) strip Test daily as needed for ketones 20 strip 0    cetirizine (ZYRTEC) 10 MG tablet TAKE ONE TABLET BY MOUTH EVERY DAY 30 tablet 6    Insulin Lispro, Human, (HUMALOG KWIKPEN) 100 UNIT/ML SOPN As directed for 3-4 injections/day up to 40 units daily (Patient taking differently: Sliding scale as directed for 3-4 injections/day up to 40 units daily) 5 Pen 6    Insulin Pen Needle (B-D ULTRAFINE III SHORT PEN) 31G X 8 MM MISC Sig:Use as directed for injections 4 - 6 times per day 200 each 6    ULTRA-COMFORT INSULIN SYRINGE 31G X 5/16\" 0.5 ML MISC USE ONCE DAILY WITH LANTUS 30 each 3    glucagon (GLUCAGON EMERGENCY) 1 MG injection As directed for extreme hypoglycemia 1 kit 2    Lancets (BD LANCET ULTRAFINE 30G) MISC Disp: # 200  Sig: For blood testing 4-6 times/day 200 each 11     No current facility-administered medications for this visit.      ROS     Positive for: Endocrine    Negative for: Constitutional, Gastrointestinal, Neurological, Skin, Genitourinary, Musculoskeletal, HENT, Cardiovascular, Eyes, Respiratory, Psychiatric, Allergic/Imm, Heme/Lymph          Family History   Problem Relation Age of Onset    Diabetes Maternal Grandfather     Retinal Detachment Maternal Grandfather     Diabetes Father         borderline    Neuropathy Father     Heart Disease Paternal Grandmother     Asthma Paternal Grandmother     Other Paternal Grandmother         edema and history of hemophilia    Cancer Maternal Grandmother 72        breast cancer    Hypertension Other         paternal side    Diabetes Other         paternal side     Glaucoma Neg Hx     Cataracts Neg Hx      Social History     Socioeconomic History    Marital status: Single     Spouse name: None    Number of children: None    Years of education: None    Highest education level: None   Occupational History     Employer: N/A   Tobacco Use    Smoking status: Passive Smoke Exposure - Never Smoker    Smokeless tobacco: Never Used    Tobacco comment: Passive smoke exposure. Substance and Sexual Activity    Alcohol use: No     Alcohol/week: 0.0 standard drinks    Drug use: Yes     Types: Marijuana     Comment: last use 6/26, stopped    Sexual activity: Yes     Partners: Male     Birth control/protection: Pill, Condom   Other Topics Concern    None   Social History Narrative    ** Merged History Encounter **          Social Determinants of Health     Financial Resource Strain: Low Risk     Difficulty of Paying Living Expenses: Not hard at all   Food Insecurity: No Food Insecurity    Worried About Running Out of Food in the Last Year: Never true    Todd of Food in the Last Year: Never true   Transportation Needs:     Lack of Transportation (Medical):      Lack of Transportation (Non-Medical):    Physical Activity:     Days of Exercise per Week:     Minutes of Exercise per Session:    Stress:     Feeling of Stress :    Social Connections:     Frequency of Communication with Friends and Family:     Frequency of Social Gatherings with Friends and Family:     Attends [employer] Services:     Active Member of Clubs or Organizations:     Attends Club or Organization Meetings:     Marital Status:    Intimate Partner Violence:     Fear of Current or Ex-Partner:     Emotionally Abused:     Physically Abused:     Sexually Abused:        Past Medical History:   Diagnosis Date    Abdominal pain     Abnormal stools     Celiac disease 06/24/2019    Chronic lymphocytic thyroiditis     Diabetic ketoacidosis ([address]     Hyperopia with astigmatism     Hypothyroid     Insomnia     Migraine     Precocious adrenarche ([address]     Precocious puberty     Seasonal allergies     Seizures ([address]     D/T BS out of control, no seizure disorder    Type 1 diabetes mellitus without (mention of) complication     date of diagnosis - 2/15/2010          Not recorded         Not recorded          Visual Acuity (Snellen - Linear)       Right Left    Dist cc 20/20 20/20    Correction: Glasses         Not recorded         Not recorded         Not recorded         Not recorded           Not recorded           Not recorded              Not recorded          Orders Placed This Encounter   Procedures    Color Fundus Photography-OU-Both Eyes       IMPRESSION:  1. Diabetes mellitus type 1, uncontrolled, insulin dependent ([address]    2. Blurred vision, bilateral        PLAN:    1. Cleared maculopathy in the left eye  2. Wear glasses full time       Glycemic control as per PCP   Patient Instructions   No treatment;  Eye health good;   No maculopathy as previous      Return in about 1 year (around 10/4/2022) for complete eye exam.

## 2022-02-26 ENCOUNTER — PATIENT MESSAGE (OUTPATIENT)
Dept: FAMILY MEDICINE CLINIC | Age: 22
End: 2022-02-26

## 2022-02-28 ENCOUNTER — OFFICE VISIT (OUTPATIENT)
Dept: FAMILY MEDICINE CLINIC | Age: 22
End: 2022-02-28
Payer: COMMERCIAL

## 2022-02-28 VITALS
WEIGHT: 165.1 LBS | RESPIRATION RATE: 18 BRPM | TEMPERATURE: 97.6 F | DIASTOLIC BLOOD PRESSURE: 74 MMHG | HEIGHT: 65 IN | OXYGEN SATURATION: 98 % | SYSTOLIC BLOOD PRESSURE: 120 MMHG | BODY MASS INDEX: 27.51 KG/M2 | HEART RATE: 80 BPM

## 2022-02-28 DIAGNOSIS — N94.6 DYSMENORRHEA: ICD-10-CM

## 2022-02-28 DIAGNOSIS — E10.9 TYPE 1 DIABETES MELLITUS WITHOUT COMPLICATION (HCC): Primary | ICD-10-CM

## 2022-02-28 PROCEDURE — 99212 OFFICE O/P EST SF 10 MIN: CPT

## 2022-02-28 PROCEDURE — 2022F DILAT RTA XM EVC RTNOPTHY: CPT | Performed by: FAMILY MEDICINE

## 2022-02-28 PROCEDURE — 3046F HEMOGLOBIN A1C LEVEL >9.0%: CPT | Performed by: FAMILY MEDICINE

## 2022-02-28 PROCEDURE — 1036F TOBACCO NON-USER: CPT | Performed by: FAMILY MEDICINE

## 2022-02-28 PROCEDURE — G8427 DOCREV CUR MEDS BY ELIG CLIN: HCPCS | Performed by: FAMILY MEDICINE

## 2022-02-28 PROCEDURE — 99214 OFFICE O/P EST MOD 30 MIN: CPT | Performed by: FAMILY MEDICINE

## 2022-02-28 PROCEDURE — G8484 FLU IMMUNIZE NO ADMIN: HCPCS | Performed by: FAMILY MEDICINE

## 2022-02-28 PROCEDURE — G8419 CALC BMI OUT NRM PARAM NOF/U: HCPCS | Performed by: FAMILY MEDICINE

## 2022-02-28 RX ORDER — ACETAMINOPHEN AND CODEINE PHOSPHATE 120; 12 MG/5ML; MG/5ML
1 SOLUTION ORAL DAILY
Qty: 28 TABLET | Refills: 11 | Status: SHIPPED | OUTPATIENT
Start: 2022-02-28

## 2022-02-28 RX ORDER — GLUCAGON 3 MG/1
POWDER NASAL
COMMUNITY
Start: 2022-01-31

## 2022-02-28 NOTE — TELEPHONE ENCOUNTER
From: Nicolás Escoto  To: Dr. Mortimer Debar: 2/26/2022 2:19 PM EST  Subject: Appointment for Sharon Henderson and Sukhwinder Suarez     They want Sukhwinder Suarez in as soon as possible he was releast today and I wanted wanted one too

## 2022-03-01 ASSESSMENT — ENCOUNTER SYMPTOMS
SORE THROAT: 0
WHEEZING: 0
SHORTNESS OF BREATH: 0
VOMITING: 0
COUGH: 0
RHINORRHEA: 0
ABDOMINAL PAIN: 0
EYE REDNESS: 0
TROUBLE SWALLOWING: 0
CONSTIPATION: 0
DIARRHEA: 0
EYE DISCHARGE: 0
NAUSEA: 0
SINUS PRESSURE: 0

## 2022-03-01 NOTE — PROGRESS NOTES
2022     Andre Diaz (:  2000) is a 22 y.o. female, here for evaluation of the following medical concerns:    HPI  Patient is seen today for follow-up postpartum with regards to her type 1 diabetes and dysmenorrhea. Patient did deliver via  at 35 weeks and 3 days 6 weeks ago. Did see the OB/GYN in follow-up and at that time they advised her that she could follow-up locally for further evaluation. Up until this point she was seeing a pediatric endocrinologist but they advise her that she does need to transition to another provider due to her age. I did discuss either seeing our diabetic educator here who manages diabetes as it would be local versus going to [place] to see the endocrinologist and she would like to try locally as it is easier for her to be more local with her appointments. Her abdominal incision has healed well. She is still bleeding from her delivery. She does have a known history of dysmenorrhea and has a lot of issues with cramping with her periods. I did advise her that we would not be able to place her on traditional birth control pills to cycle her menses as she is breast-feeding. Did talk about progesterone only birth control and she seems agreeable with this plan. Patient otherwise seems to be doing well with motherhood. Is breast-feeding and doing well with this. No other acute issues at this time. Did review patient's med list, allergies, social history, fam history, pmhx and pshx today as noted in the record. Review of Systems   Constitutional: Negative for chills, fatigue and fever. HENT: Negative for congestion, ear pain, postnasal drip, rhinorrhea, sinus pressure, sore throat and trouble swallowing. Eyes: Negative for discharge and redness. Respiratory: Negative for cough, shortness of breath and wheezing. Cardiovascular: Negative for chest pain.    Gastrointestinal: Negative for abdominal pain, constipation, diarrhea, nausea and vomiting. Genitourinary: Negative for dysuria, flank pain, frequency and urgency. Musculoskeletal: Negative for arthralgias, myalgias and neck pain. Skin: Negative for rash and wound. Allergic/Immunologic: Negative for environmental allergies. Neurological: Negative for dizziness, weakness, light-headedness and headaches. Hematological: Negative for adenopathy. Psychiatric/Behavioral: Negative. Prior to Visit Medications    Medication Sig Taking?  Authorizing Provider   norethindrone (JOLEEN) 0.35 MG tablet Take 1 tablet by mouth daily Yes Clent Goods, DO   levothyroxine (SYNTHROID) 100 MCG tablet Take 100 mcg by mouth Daily Yes Historical Provider, MD DAMON TRAYLOR 100 UNIT/ML injection pen Inject 11 Units into the skin 2 times daily  Yes Historical Provider, MD KENNEDY INS SYR ULTRAFINE 1CC/31G 31G X 5/16\" 1 ML MISC  Yes Historical Provider, MD   TRUETEST TEST strip TEST SIX TO EIGHT TIMES DAILY Yes Demetrio Galvan MD   acetone, urine, test (KETOSTIX) strip Test daily as needed for ketones Yes Marycruz Yang MD   cetirizine (ZYRTEC) 10 MG tablet TAKE ONE TABLET BY MOUTH EVERY DAY Yes EILEEN Thomason   Insulin Lispro, Human, (HUMALOG KWIKPEN) 100 UNIT/ML SOPN As directed for 3-4 injections/day up to 40 units daily  Patient taking differently: Sliding scale as directed for 3-4 injections/day up to 40 units daily Yes Eloise NEWBY MD   Insulin Pen Needle (GADIEL-LORRAINE ULTRAFINE III SHORT PEN) 31G X 8 MM MISC Sig:Use as directed for injections 4 - 6 times per day Yes Ana Atkinson MD   ULTRA-COMFORT INSULIN SYRINGE 31G X 5/16\" 0.5 ML MISC USE ONCE DAILY WITH LANTUS Yes Kath Dave MD   Lancets (BD LANCET ULTRAFINE 30G) MISC Disp: # 200  Sig: For blood testing 4-6 times/day Yes Kath Dave MD   vitamin D (CHOLECALCIFEROL) 25 MCG (1000 UT) TABS tablet Take 1,000 Units by mouth daily  Historical Provider, MD Charles Flor ONE PACK 3 MG/DOSE POWD   Historical Provider, MD   Prenatal Vit-Fe Fumarate-FA (PRENATAL COMPLETE) 14-0.4 MG TABS Take 1 tablet by mouth daily  Patient not taking: Reported on 2/28/2022  Nicho Herring APRN - [facility]   desogestrel-ethinyl estradiol ([place]) 0.15-0.02/0.01 MG (21/5) per tablet Take 1 tablet by mouth daily  Patient not taking: Reported on 6/30/2021  Nicho Herring APRN - [facility]   glucagon (GLUCAGON EMERGENCY) 1 MG injection As directed for extreme hypoglycemia  Patient not taking: Reported on 2/28/2022  Cesar Fernández MD        Social History     Tobacco Use    Smoking status: Passive Smoke Exposure - Never Smoker    Smokeless tobacco: Never Used    Tobacco comment: Passive smoke exposure. Substance Use Topics    Alcohol use: No     Alcohol/week: 0.0 standard drinks        Vitals:    02/28/22 1511   BP: 120/74   Site: Right Upper Arm   Position: Sitting   Cuff Size: Medium Adult   Pulse: 80   Resp: 18   Temp: 97.6 °F (36.4 °C)   TempSrc: Tympanic   SpO2: 98%   Weight: 165 lb 1.6 oz (74.9 kg)   Height: 5' 5.25\" (1.657 m)     Estimated body mass index is 27.26 kg/m² as calculated from the following:    Height as of this encounter: 5' 5.25\" (1.657 m). Weight as of this encounter: 165 lb 1.6 oz (74.9 kg). Physical Exam  Vitals and nursing note reviewed. Constitutional:       General: She is not in acute distress. Appearance: Normal appearance. She is well-developed. She is not diaphoretic. HENT:      Head: Normocephalic and atraumatic. Right Ear: Tympanic membrane, ear canal and external ear normal.      Left Ear: Tympanic membrane, ear canal and external ear normal.      Nose: Nose normal.      Mouth/Throat:      Mouth: Mucous membranes are moist.      Pharynx: Oropharynx is clear. No oropharyngeal exudate. Eyes:      General:         Right eye: No discharge. Left eye: No discharge. Conjunctiva/sclera: Conjunctivae normal.      Pupils: Pupils are equal, round, and reactive to light. Neck:      Thyroid: No thyromegaly. Cardiovascular:      Rate and Rhythm: Normal rate and regular rhythm. Heart sounds: Normal heart sounds. Pulmonary:      Effort: Pulmonary effort is normal.      Breath sounds: Normal breath sounds. No wheezing or rales. Abdominal:      General: Bowel sounds are normal. There is no distension. Palpations: Abdomen is soft. Tenderness: There is no abdominal tenderness. Comments: Lower abdominal  scar appears to be healing well. No erythema or drainage from wound. Musculoskeletal:      Cervical back: Normal range of motion and neck supple. Lymphadenopathy:      Cervical: No cervical adenopathy. Skin:     General: Skin is warm and dry. Findings: No rash. Neurological:      Mental Status: She is alert and oriented to person, place, and time. Psychiatric:         Behavior: Behavior normal.         Thought Content: Thought content normal.         Judgment: Judgment normal.         ASSESSMENT/PLAN:    Encounter Diagnoses   Name Primary?  Type 1 diabetes mellitus without complication ([facility] Yes    Dysmenorrhea      Orders Placed This Encounter   Medications    norethindrone (JOLEEN) 0.35 MG tablet     Sig: Take 1 tablet by mouth daily     Dispense:  28 tablet     Refill:  11     Orders Placed This Encounter   Procedures   [address], [place], Diabetic Education, [place]     Referral Priority:   Routine     Referral Type:   Eval and Treat     Referral Reason:   Specialty Services Required     Referred to Provider:   MARIA D Choe CNP     Requested Specialty:   Nurse Practitioner     Number of Visits Requested:   1     Will start patient on norethindrone tablets to help with her menstrual control. Patient is referred to our diabetic educator for diabetic management. May still end up referring her to endocrinology but she would like to try to have this managed locally.     Patient is to return to my office annually for follow-up of her chronic health conditions and for routine general physical exam or sooner if any further problems or symptoms arise. (Please note that portions of this note were completed with a voice recognition program. Efforts were made to edit the dictations but occasionally words are mis-transcribed.)      No follow-ups on file. An electronic signature was used to authenticate this note.     --Fariha Dominguez,  on 3/1/2022 at 10:35 AM

## 2022-03-22 ENCOUNTER — OFFICE VISIT (OUTPATIENT)
Dept: DIABETES SERVICES | Age: 22
End: 2022-03-22
Payer: COMMERCIAL

## 2022-03-22 VITALS
HEART RATE: 92 BPM | HEIGHT: 65 IN | WEIGHT: 166 LBS | RESPIRATION RATE: 14 BRPM | BODY MASS INDEX: 27.66 KG/M2 | DIASTOLIC BLOOD PRESSURE: 60 MMHG | SYSTOLIC BLOOD PRESSURE: 112 MMHG

## 2022-03-22 DIAGNOSIS — E10.9 TYPE 1 DIABETES MELLITUS WITHOUT COMPLICATION (HCC): Primary | ICD-10-CM

## 2022-03-22 PROBLEM — K90.0 CELIAC DISEASE: Status: ACTIVE | Noted: 2019-06-24

## 2022-03-22 PROBLEM — E03.9 HYPOTHYROIDISM: Status: ACTIVE | Noted: 2021-08-25

## 2022-03-22 PROCEDURE — 3046F HEMOGLOBIN A1C LEVEL >9.0%: CPT | Performed by: NURSE PRACTITIONER

## 2022-03-22 PROCEDURE — G8484 FLU IMMUNIZE NO ADMIN: HCPCS | Performed by: NURSE PRACTITIONER

## 2022-03-22 PROCEDURE — 99214 OFFICE O/P EST MOD 30 MIN: CPT | Performed by: NURSE PRACTITIONER

## 2022-03-22 PROCEDURE — 2022F DILAT RTA XM EVC RTNOPTHY: CPT | Performed by: NURSE PRACTITIONER

## 2022-03-22 PROCEDURE — 1036F TOBACCO NON-USER: CPT | Performed by: NURSE PRACTITIONER

## 2022-03-22 PROCEDURE — G8427 DOCREV CUR MEDS BY ELIG CLIN: HCPCS | Performed by: NURSE PRACTITIONER

## 2022-03-22 PROCEDURE — G8419 CALC BMI OUT NRM PARAM NOF/U: HCPCS | Performed by: NURSE PRACTITIONER

## 2022-03-22 PROCEDURE — 95251 CONT GLUC MNTR ANALYSIS I&R: CPT | Performed by: NURSE PRACTITIONER

## 2022-03-22 PROCEDURE — 99205 OFFICE O/P NEW HI 60 MIN: CPT | Performed by: NURSE PRACTITIONER

## 2022-03-22 ASSESSMENT — ENCOUNTER SYMPTOMS
ABDOMINAL PAIN: 0
RESPIRATORY NEGATIVE: 1
SHORTNESS OF BREATH: 0
DIARRHEA: 0

## 2022-03-22 NOTE — PATIENT INSTRUCTIONS
Sick Day Guidelines for Diabetes Patients    Use this chart every 2 hours based on your child's current blood glucose and urine ketones. Use your Correction Factor every 2 hours as specified below. If your child is using an insulin pump and they have ketones, change the infusion site and give all corrections with a syringe until blood glucose is less than 200 mg/dL and urine ketones are negative. Urine  Ketones Blood   Glucose Specific Instructions   No ketones Below 100 mg/dL Regular popsicle or sip 4 ounces of sugar containing fluids every hour. If you cannot keep blood glucose above 80 mg/dL then go to the nearest hospital ER. No ketones Between  101-200 mg/dL Regular popsicle or sip 4 ounces of sugar containing fluids every hour. No ketones Above 201 mg/dL Use your correction factor as above  Carb free (water, crystal light, powerade zero, etc.) popsicle or sips of 4 ounce of carb free fluids every hour. Trace/small ketones Below 100 mg/dL Regular popsicle or sip 4 ounces of sugar containing fluids every hour. If you cannot keep blood glucose above 80 mg/dL then go to the nearest hospital ER. Trace/small ketones Between  101-200 mg/dL Regular popsicle or sip 4 ounces of sugar containing fluids every hour. Trace/small ketones Above 201 mg/dL Use your correction factor as above  Carb free (water, crystal light, powerade zero, etc.) popsicle or sips of 4 ounce of carb free fluids every hour. Moderate/Large ketones Below 100 mg/dL Regular popsicle or sip 4 ounces of sugar containing fluids every hour until blood glucose is greater than 201 mg/dl. Once blood glucose greater than 201 mg/dl use your correction factor as above. If you cannot keep blood glucose above 80 mg/dL, go to the nearest hospital ER. Contact [facility] if no improvement in 2 hours.    Moderate/Large ketones Between   101-200 mg/dL Regular popsicle or sip 4 ounces of sugar containing fluids every hour blood glucose is greater than 201 mg/dl. Once blood glucose greater than 201 mg/dl use your correction factor as above. Contact [facility] if no improvement in 2 hours. Moderate/Large Ketones Above 201 mg/dL Use your correction factor as above. Carb free (water, crystal light, powerade zero, etc.) popsicle or sips of 4 ounce of carb free fluids every hour  Contact [facility] if no improvement in 2 hours.        -wait at least 4 hours before you do a glucose correction   -if bs is high try a protein meal   -double check carb counts

## 2022-03-22 NOTE — PROGRESS NOTES
[facility] [address]  [address] [facility], [address]  [facility] [id number] [phone]        HISTORY:    Blair Hardin presents today for evaluation and management of:  Chief Complaint   Patient presents with    New Patient     type 1 diabetic. Dx in        HPI    Interval History:      Current Diabetic Medications  basaglar 13 in the am and 12 in the pm humalog with meals ICR 10:1 with breakfast in lunch and 5:1 with dinner. DKA episodes:  DKA with diagnosis after H1N1 vaccine.  DKA unknown cause. 22   Blair Hardin is a 22 y.o. female patient who presents to clinic today for her diabetes. she has a history of wide excursions, hypothyroidism and celiac disease which contributes to her diabetes. She was previously seen by cong chris and after having a child was referred to adult but has not to find one local. she denies any current signs or symptoms of hyper/hypoglycemia. she states they are taking their medications as prescribed without any adverse effects. She does take short acting after meals to prevent hypoglycemia. She recently changed ICR due to highs after dinner. She is about 2 months postpartum and currently breastfeeding. Last night she had 7 mini tacos, chips and salsa with 3 cups of watermelon and counted 250 grams of carbs for this meal.   Diet: regular carb counting.   Exercise: none  BS testing: uses cgm daily with success and is adherent to cgm therapy  Issues: wide fluctuations  Diabetic foot exam up-to-date: No  Diabetic retinal exam up-to-date: Yes  Hypoglycemia as needed treatment: emergency glucagon nasal spray           Past Medical History:   Diagnosis Date    Abdominal pain     Abnormal stools     Celiac disease 2019    Chronic lymphocytic thyroiditis     Delivery by emergency  section 2022    Diabetic ketoacidosis (HCC)     Hyperopia with astigmatism     Hypothyroid     Insomnia     Migraine     Precocious adrenarche (HCC)     Precocious puberty     Seasonal allergies     Seizures (HCC)     D/T BS out of control, no seizure disorder    Type 1 diabetes mellitus without (mention of) complication     date of diagnosis - 2/15/2010     Family History   Problem Relation Age of Onset    Diabetes Maternal Grandfather     Retinal Detachment Maternal Grandfather     Diabetes Father         borderline    Neuropathy Father     Heart Disease Paternal Grandmother     Asthma Paternal Grandmother     Other Paternal Grandmother         edema and history of hemophilia    Cancer Maternal Grandmother 72        breast cancer    Hypertension Other         paternal side    Diabetes Other         paternal side     Glaucoma Neg Hx     Cataracts Neg Hx      Social History     Tobacco Use    Smoking status: Passive Smoke Exposure - Never Smoker    Smokeless tobacco: Never Used    Tobacco comment: Passive smoke exposure.    Substance Use Topics    Alcohol use: No     Alcohol/week: 0.0 standard drinks    Drug use: Not Currently     Types: Marijuana Reed Monique)     Comment: last use 6/26, stopped     Allergies   Allergen Reactions    Oxycodone Hives and Itching    Gluten Meal     Insulin Detemir Other (See Comments), Swelling and Hives     Local reaction  Local reaction    Other Other (See Comments)     flouride  Causes hives  flouride  Causes hives    Codeine Hives, Nausea And Vomiting and Rash     Other reaction(s): Vomiting       MEDICATIONS:  Current Outpatient Medications   Medication Sig Dispense Refill    vitamin D (CHOLECALCIFEROL) 25 MCG (1000 UT) TABS tablet Take 1,000 Units by mouth daily      norethindrone (JOLEEN) 0.35 MG tablet Take 1 tablet by mouth daily 28 tablet 11    desogestrel-ethinyl estradiol (KARIVA) 0.15-0.02/0.01 MG (21/5) per tablet Take 1 tablet by mouth daily 1 packet 3    levothyroxine (SYNTHROID) 100 MCG tablet Take 100 mcg by mouth Daily  BASAGLAR KWIKPEN 100 UNIT/ML injection pen 13 units every morning and 12 units nightly      cetirizine (ZYRTEC) 10 MG tablet TAKE ONE TABLET BY MOUTH EVERY DAY 30 tablet 6    Insulin Lispro, Human, (HUMALOG KWIKPEN) 100 UNIT/ML SOPN As directed for 3-4 injections/day up to 40 units daily (Patient taking differently: Sliding scale as directed for 3-4 injections/day up to 40 units daily) 5 Pen 6    BAQSIMI ONE PACK 3 MG/DOSE POWD       B-D INS SYR ULTRAFINE 1CC/31G 31G X 5/16\" 1 ML MISC       TRUETEST TEST strip TEST SIX TO EIGHT TIMES DAILY 200 each 0    acetone, urine, test (KETOSTIX) strip Test daily as needed for ketones 20 strip 0    Insulin Pen Needle (B-D ULTRAFINE III SHORT PEN) 31G X 8 MM MISC Sig:Use as directed for injections 4 - 6 times per day 200 each 6    ULTRA-COMFORT INSULIN SYRINGE 31G X 5/16\" 0.5 ML MISC USE ONCE DAILY WITH LANTUS 30 each 3    glucagon (GLUCAGON EMERGENCY) 1 MG injection As directed for extreme hypoglycemia (Patient not taking: Reported on 2/28/2022) 1 kit 2    Lancets (BD LANCET ULTRAFINE 30G) MISC Disp: # 200  Sig: For blood testing 4-6 times/day 200 each 11     No current facility-administered medications for this visit. Review ofSymptoms:  Review of Systems   Constitutional: Positive for fatigue. Negative for unexpected weight change. Eyes: Negative for visual disturbance. Respiratory: Negative. Negative for shortness of breath. Cardiovascular: Negative for chest pain and leg swelling. Gastrointestinal: Negative for abdominal pain and diarrhea. Endocrine: Negative for polydipsia, polyphagia and polyuria. Genitourinary: Negative. Musculoskeletal: Negative. Skin: Negative for rash and wound. Neurological: Negative for dizziness, tremors, seizures and headaches. Psychiatric/Behavioral: Negative. Negative for confusion and decreased concentration. Theremainder of a complete 14-point review of systems is negative.        Vital Signs: /60 (Site: Right Upper Arm, Position: Sitting, Cuff Size: Medium Adult)   Pulse 92   Resp 14   Ht 5' 5.25\" (1.657 m)   Wt 166 lb (75.3 kg)   BMI 27.41 kg/m²      Wt Readings from Last 3 Encounters:   03/22/22 166 lb (75.3 kg)   02/28/22 165 lb 1.6 oz (74.9 kg)   09/10/21 159 lb 14.4 oz (72.5 kg) (87 %, Z= 1.13)*     * Growth percentiles are based on CDC (Girls, 2-20 Years) data. Body mass index is 27.41 kg/m². LABS:  Hemoglobin A1C   Date Value Ref Range Status   06/30/2021 8.2 (H) 4.0 - 6.0 % Final   09/01/2020 7.9 (H) 4.8 - 5.9 % Final     Lab Results   Component Value Date    LABMICR CANNOT BE CALCULATED 09/22/2021     Lab Results   Component Value Date     06/30/2021    K 4.1 06/30/2021     06/30/2021    CO2 23 06/30/2021    BUN 9 06/30/2021    CREATININE 0.45 (L) 06/30/2021    GLUCOSE 193 (H) 06/30/2021    CALCIUM 9.4 06/30/2021    PROT 7.5 06/30/2021    LABALBU 4.5 06/30/2021    BILITOT 0.60 06/30/2021    ALKPHOS 84 06/30/2021    AST 14 06/30/2021    ALT 11 06/30/2021    LABGLOM  06/30/2021     Pediatric GFR requires additional information. Refer to [facility] website for calculator.     GFRAA NOT REPORTED 06/30/2021     Lab Results   Component Value Date    CHOL 124 09/01/2020    CHOL 174 07/25/2019    CHOL 193 02/15/2019     Lab Results   Component Value Date    TRIG 142 09/01/2020    TRIG 155 (H) 07/25/2019    TRIG 242 (H) 02/15/2019     Lab Results   Component Value Date    HDL 31 (L) 09/01/2020    HDL 33 (L) 07/25/2019    HDL 40 (L) 02/15/2019     Lab Results   Component Value Date    LDLCHOLESTEROL 65 09/01/2020    LDLCHOLESTEROL 110 07/25/2019    LDLCHOLESTEROL 105 02/15/2019     Lab Results   Component Value Date    VLDL NOT REPORTED 09/01/2020    VLDL NOT REPORTED (H) 07/25/2019    VLDL NOT REPORTED (H) 02/15/2019     Lab Results   Component Value Date    CHOLHDLRATIO 4.0 09/01/2020    CHOLHDLRATIO 5.3 (H) 07/25/2019    CHOLHDLRATIO 4.8 02/15/2019           Physical Exam  Constitutional:       Appearance: She is well-developed. Eyes:      Pupils: Pupils are equal, round, and reactive to light. Neck:      Thyroid: No thyroid mass, thyromegaly or thyroid tenderness. Cardiovascular:      Rate and Rhythm: Normal rate and regular rhythm. Heart sounds: Normal heart sounds. Pulmonary:      Effort: Pulmonary effort is normal.      Breath sounds: Normal breath sounds. Abdominal:      General: Bowel sounds are normal.      Palpations: Abdomen is soft. Skin:     General: Skin is warm and dry. Comments: Negative for open/nonhealing wounds. Negative for lipohypertrophy. Neurological:      Mental Status: She is alert and oriented to person, place, and time. ASSESSMENT/PLAN:     Diagnosis Orders   1. Type 1 diabetes mellitus without complication (HCC)       No orders of the defined types were placed in this encounter. No orders of the defined types were placed in this encounter. Requested Prescriptions      No prescriptions requested or ordered in this encounter       1. Type 1 diabetes mellitus without complication (HCC)  - Unstable  HbA1C goal is less than 7%. - Fasting blood glucose goal is 70-120mg/dl and postprandial blood sugar goal is less than 180 mg/dl. - Labs reviewed including most recent A1c, microalbumin and kidney function. Repeat labs due in 1 months.    -We discussed in great detail dietary modifications they can make to better improve their blood sugars. --Initial diabetic education completed. Discussed diabetes as a disease and how we can manage it to prevent complications associated with it. CGM report downloaded and reviewed, scanned to media tab. Time in range 27% and hypoglycemia 0%.   Average glucose 259 mg/dl  -she will work on 4 hour active insulin time to avoid insulin stacking  -she will document food and insulin in dexcom reader  -she will double check carb count (my over estimation of her carb intake for example meal was 150 grams compared to her 250 grams). -she will not skip meals but if glucose is high will eat protein   -ok to take insulin after meals at this time  -change ICR back to 10:1 for all meals   -I feel she is over correcting and insulin stacking.   -close follow up recommended may consider insulin pump or Afrezza for less fluctuations. Discussed signs and symptoms of hyper/hypoglycemia and how to treat. Encouraged 150 minutes of physical activity per week. Follow a low carbohydrate diet. Encouraged at least 7 hours of sleep. The patient was informed of the goals of diabetes management. This can only be accomplished by watching their diet and exercise levels. We certainly use medicines to help attain these goals. The consequences of not controlling blood sugars were discussed. These include blindness, heart disease, stroke, kidney disease, and possibly need for dialysis. They were told to be careful with their foot care as diabetics often have nerve damage, infections and risk for limb amutations . They also need a dilated eye exam yearly. We discussed the issues of diet, exercise, medication, complication avoidance, reviewed the signs and symptoms of diabetes, hypoglycemic episodes, significance of HbA1C. Answered all patient questions. Agrees to follow plan of care and to follow up in 1 months, sooner if needed. Call office if unexplained blood sugars less than 70 occur or above 400. Call office or access MyChart with any further questions or concerns. Be sure to bring glucometer/food log at next appointment. Total time spent reviewing chart, labs, counseling patient and documenting on the date of the encounter: 60 min.       Electronically signed by MARIA D Davis CNP on 3/22/2022 at 2:30 PM      (Please note that portions of this note were completed with a voice-recognition program. Efforts were made to edit the dictation but occasionally words are mis-transcribed.)

## 2022-04-26 ENCOUNTER — OFFICE VISIT (OUTPATIENT)
Dept: DIABETES SERVICES | Age: 22
End: 2022-04-26
Payer: COMMERCIAL

## 2022-04-26 VITALS
HEIGHT: 65 IN | DIASTOLIC BLOOD PRESSURE: 72 MMHG | WEIGHT: 166 LBS | HEART RATE: 88 BPM | BODY MASS INDEX: 27.66 KG/M2 | SYSTOLIC BLOOD PRESSURE: 102 MMHG | RESPIRATION RATE: 16 BRPM

## 2022-04-26 DIAGNOSIS — E10.9 TYPE 1 DIABETES MELLITUS WITHOUT COMPLICATION (HCC): Primary | ICD-10-CM

## 2022-04-26 DIAGNOSIS — E03.9 HYPOTHYROIDISM, UNSPECIFIED TYPE: ICD-10-CM

## 2022-04-26 PROCEDURE — 2022F DILAT RTA XM EVC RTNOPTHY: CPT | Performed by: NURSE PRACTITIONER

## 2022-04-26 PROCEDURE — 99213 OFFICE O/P EST LOW 20 MIN: CPT

## 2022-04-26 PROCEDURE — 1036F TOBACCO NON-USER: CPT | Performed by: NURSE PRACTITIONER

## 2022-04-26 PROCEDURE — 3046F HEMOGLOBIN A1C LEVEL >9.0%: CPT | Performed by: NURSE PRACTITIONER

## 2022-04-26 PROCEDURE — 99214 OFFICE O/P EST MOD 30 MIN: CPT | Performed by: NURSE PRACTITIONER

## 2022-04-26 PROCEDURE — G8427 DOCREV CUR MEDS BY ELIG CLIN: HCPCS | Performed by: NURSE PRACTITIONER

## 2022-04-26 PROCEDURE — 95251 CONT GLUC MNTR ANALYSIS I&R: CPT | Performed by: NURSE PRACTITIONER

## 2022-04-26 PROCEDURE — G8419 CALC BMI OUT NRM PARAM NOF/U: HCPCS | Performed by: NURSE PRACTITIONER

## 2022-04-26 RX ORDER — INSULIN GLARGINE 100 [IU]/ML
INJECTION, SOLUTION SUBCUTANEOUS
Qty: 15 PEN | Refills: 3 | Status: CANCELLED | OUTPATIENT
Start: 2022-04-26

## 2022-04-26 RX ORDER — LANCETS 30 GAUGE
EACH MISCELLANEOUS
Qty: 200 EACH | Refills: 11 | Status: SHIPPED | OUTPATIENT
Start: 2022-04-26

## 2022-04-26 RX ORDER — PEN NEEDLE, DIABETIC 31 GX5/16"
NEEDLE, DISPOSABLE MISCELLANEOUS
Qty: 200 EACH | Refills: 6 | Status: SHIPPED | OUTPATIENT
Start: 2022-04-26

## 2022-04-26 RX ORDER — LEVOTHYROXINE SODIUM 0.1 MG/1
100 TABLET ORAL DAILY
Qty: 30 TABLET | Refills: 0 | Status: SHIPPED
Start: 2022-04-26 | End: 2022-08-10 | Stop reason: DRUGHIGH

## 2022-04-26 RX ORDER — URINE ACETONE TEST STRIPS
STRIP MISCELLANEOUS
Qty: 50 STRIP | Refills: 2 | Status: SHIPPED | OUTPATIENT
Start: 2022-04-26

## 2022-04-26 RX ORDER — BIOTIN 1 MG
TABLET ORAL
Qty: 200 EACH | Refills: 0 | Status: SHIPPED | OUTPATIENT
Start: 2022-04-26 | End: 2022-10-31 | Stop reason: SDUPTHER

## 2022-04-26 RX ORDER — INSULIN DEGLUDEC 200 U/ML
26 INJECTION, SOLUTION SUBCUTANEOUS DAILY
Qty: 3 PEN | Refills: 3 | Status: SHIPPED | OUTPATIENT
Start: 2022-04-26 | End: 2022-05-26 | Stop reason: SDUPTHER

## 2022-04-26 RX ORDER — INSULIN LISPRO 100 [IU]/ML
INJECTION, SOLUTION INTRAVENOUS; SUBCUTANEOUS
Qty: 15 ML | Refills: 3 | Status: CANCELLED | OUTPATIENT
Start: 2022-04-26

## 2022-04-26 NOTE — PROGRESS NOTES
[address], [address]  [facility] [address] [phone]        HISTORY:    Atif Hyde presents today for evaluation and management of:  Chief Complaint   Patient presents with    1 Month Follow-Up    Diabetes       HPI    Interval History:      Current Diabetic Medications  basaglar 13 in the am and 12 in the pm humalog with meals ICR 10:1 with breakfast in lunch and 5:1 with dinner.      DKA episodes: 2009 DKA with diagnosis after H1N1 vaccine. 2012 DKA unknown cause. 03/22/22   Atif Hyde is a 22 y.o. female patient who presents to clinic today for her diabetes. she has a history of wide excursions, hypothyroidism and celiac disease which contributes to her diabetes. She was previously seen by cong chris and after having a child was referred to adult but has not to find one local. she denies any current signs or symptoms of hyper/hypoglycemia. she states they are taking their medications as prescribed without any adverse effects. She does take short acting after meals to prevent hypoglycemia. She recently changed ICR due to highs after dinner. She is about 2 months postpartum and currently breastfeeding. Last night she had 7 mini tacos, chips and salsa with 3 cups of watermelon and counted 250 grams of carbs for this meal.   Diet: regular carb counting. Exercise: none  BS testing: uses cgm daily with success and is adherent to cgm therapy  Issues: wide fluctuations  Diabetic foot exam up-to-date: No  Diabetic retinal exam up-to-date: Yes  Hypoglycemia as needed treatment: emergency glucagon nasal spray    04/27/22   Atif Hyde is a 22 y.o. female patient who presents to clinic today for her diabetes. she has a history of wide excursions, hypothyroidism and celiac disease  which contributes to her diabetes. At previous visit [facility] was adjsusted.  she denies any current signs or symptoms of hyper/hypoglycemia. she states they are taking their medications as prescribed without any adverse effects. She continues to have wide fluctuations in blood sugars. Documents well. Diet: regular carb counting. Exercise: none  BS testing: uses cgm daily with success and is adherent to cgm therapy  Issues: wide fluctuations  Diabetic foot exam up-to-date: No  Diabetic retinal exam up-to-date: Yes  Hypoglycemia as needed treatment: emergency glucagon nasal spray      Hypothyroidism- Fatigue is present denies any change in weight denies heat or cold intolerance denies mood changes does have menstrual irregularities denies weakness or tremor denies constipation or diarrhea denies any neck pain or change in voice denies any sleep issues.       Past Medical History:   Diagnosis Date    Abdominal pain     Abnormal stools     Celiac disease 2019    Chronic lymphocytic thyroiditis     Delivery by emergency  section 2022    Diabetic ketoacidosis (HCC)     Hyperopia with astigmatism     Hypothyroid     Insomnia     Migraine     Precocious adrenarche ([facility] [address]     Precocious puberty     Seasonal allergies     Seizures (HCC)     D/T BS out of control, no seizure disorder    Type 1 diabetes mellitus without (mention of) complication     date of diagnosis - 2/15/2010     Family History   Problem Relation Age of Onset    Diabetes Maternal Grandfather     Retinal Detachment Maternal Grandfather     Diabetes Father         borderline    Neuropathy Father     Heart Disease Paternal Grandmother     Asthma Paternal Grandmother     Other Paternal Grandmother         edema and history of hemophilia    Cancer Maternal Grandmother 72        breast cancer    Hypertension Other         paternal side    Diabetes Other         paternal side     Glaucoma Neg Hx     Cataracts Neg Hx      Social History     Tobacco Use    Smoking status: Passive Smoke Exposure - Never Smoker    Smokeless tobacco: Never Used    Tobacco comment: Passive smoke exposure. Substance Use Topics    Alcohol use: No     Alcohol/week: 0.0 standard drinks    Drug use: Not Currently     Types: Marijuana Karolynjimmy November)     Comment: last use 6/26, stopped     Allergies   Allergen Reactions    Oxycodone Hives and Itching    Gluten Meal     Insulin Detemir Other (See Comments), Swelling and Hives     Local reaction  Local reaction    Other Other (See Comments)     flouride  Causes hives  flouride  Causes hives    Codeine Hives, Nausea And Vomiting and Rash     Other reaction(s): Vomiting       MEDICATIONS:  Current Outpatient Medications   Medication Sig Dispense Refill    Lancets (BD LANCET ULTRAFINE 30G) MISC Use to test blood sugar 5 times daily 200 each 11    Insulin Pen Needle (B-D ULTRAFINE III SHORT PEN) 31G X 8 MM MISC Use to inject insulin 6 times daily 200 each 6    acetone, urine, test (KETOSTIX) strip Test daily as needed for ketones 50 strip 2    blood glucose test strips (TRUETEST TEST) strip Use to test blood sugars 5 times daily 200 each 0    Insulin Degludec (TRESIBA FLEXTOUCH) 200 UNIT/ML SOPN Inject 26 Units into the skin daily 3 pen 3    levothyroxine (SYNTHROID) 100 MCG tablet Take 1 tablet by mouth Daily 30 tablet 0    vitamin D (CHOLECALCIFEROL) 25 MCG (1000 UT) TABS tablet Take 1,000 Units by mouth daily      norethindrone (JOLEEN) 0.35 MG tablet Take 1 tablet by mouth daily 28 tablet 11    cetirizine (ZYRTEC) 10 MG tablet TAKE ONE TABLET BY MOUTH EVERY DAY 30 tablet 6    BAQSIMI ONE PACK 3 MG/DOSE POWD       B-D INS SYR ULTRAFINE 1CC/31G 31G X 5/16\" 1 ML MISC       ULTRA-COMFORT INSULIN SYRINGE 31G X 5/16\" 0.5 ML MISC USE ONCE DAILY WITH LANTUS 30 each 3    glucagon (GLUCAGON EMERGENCY) 1 MG injection As directed for extreme hypoglycemia (Patient not taking: Reported on 2/28/2022) 1 kit 2     No current facility-administered medications for this visit.        Review ofSymptoms:  Review of Systems Constitutional: Positive for fatigue. Negative for unexpected weight change. Eyes: Negative for visual disturbance. Respiratory: Negative. Negative for shortness of breath. Cardiovascular: Negative for chest pain and leg swelling. Gastrointestinal: Negative for abdominal pain and diarrhea. Endocrine: Negative for polydipsia, polyphagia and polyuria. Genitourinary: Negative. Musculoskeletal: Negative. Skin: Negative for rash and wound. Neurological: Negative for dizziness, tremors, seizures and headaches. Psychiatric/Behavioral: Negative. Negative for confusion and decreased concentration. Theremainder of a complete 14-point review of systems is negative. Vital Signs: /72 (Site: Left Upper Arm, Position: Sitting, Cuff Size: Medium Adult)   Pulse 88   Resp 16   Ht 5' 5.25\" (1.657 m)   Wt 166 lb (75.3 kg)   LMP 04/18/2022   BMI 27.41 kg/m²      Wt Readings from Last 3 Encounters:   04/26/22 166 lb (75.3 kg)   03/22/22 166 lb (75.3 kg)   02/28/22 165 lb 1.6 oz (74.9 kg)     Body mass index is 27.41 kg/m². LABS:  Hemoglobin A1C   Date Value Ref Range Status   06/30/2021 8.2 (H) 4.0 - 6.0 % Final   09/01/2020 7.9 (H) 4.8 - 5.9 % Final     Lab Results   Component Value Date    LABMICR CANNOT BE CALCULATED 09/22/2021     Lab Results   Component Value Date     06/30/2021    K 4.1 06/30/2021     06/30/2021    CO2 23 06/30/2021    BUN 9 06/30/2021    CREATININE 0.45 (L) 06/30/2021    GLUCOSE 193 (H) 06/30/2021    CALCIUM 9.4 06/30/2021    PROT 7.5 06/30/2021    LABALBU 4.5 06/30/2021    BILITOT 0.60 06/30/2021    ALKPHOS 84 06/30/2021    AST 14 06/30/2021    ALT 11 06/30/2021    LABGLOM  06/30/2021     Pediatric GFR requires additional information. Refer to [facility] website for calculator.     GFRAA NOT REPORTED 06/30/2021     Lab Results   Component Value Date    CHOL 124 09/01/2020    CHOL 174 07/25/2019    CHOL 193 02/15/2019     Lab Results   Component Value Date TRIG 142 09/01/2020    TRIG 155 (H) 07/25/2019    TRIG 242 (H) 02/15/2019     Lab Results   Component Value Date    HDL 31 (L) 09/01/2020    HDL 33 (L) 07/25/2019    HDL 40 (L) 02/15/2019     Lab Results   Component Value Date    LDLCHOLESTEROL 65 09/01/2020    LDLCHOLESTEROL 110 07/25/2019    LDLCHOLESTEROL 105 02/15/2019     Lab Results   Component Value Date    VLDL NOT REPORTED 09/01/2020    VLDL NOT REPORTED (H) 07/25/2019    VLDL NOT REPORTED (H) 02/15/2019     Lab Results   Component Value Date    CHOLHDLRATIO 4.0 09/01/2020    CHOLHDLRATIO 5.3 (H) 07/25/2019    CHOLHDLRATIO 4.8 02/15/2019           Physical Exam  Constitutional:       Appearance: She is well-developed. Eyes:      Pupils: Pupils are equal, round, and reactive to light. Neck:      Thyroid: No thyroid mass, thyromegaly or thyroid tenderness. Cardiovascular:      Rate and Rhythm: Normal rate and regular rhythm. Heart sounds: Normal heart sounds. Pulmonary:      Effort: Pulmonary effort is normal.      Breath sounds: Normal breath sounds. Abdominal:      General: Bowel sounds are normal.      Palpations: Abdomen is soft. Skin:     General: Skin is warm and dry. Comments: Negative for open/nonhealing wounds. Negative for lipohypertrophy. Neurological:      Mental Status: She is alert and oriented to person, place, and time. ASSESSMENT/PLAN:     Diagnosis Orders   1. Type 1 diabetes mellitus (HCC)  Lancets (BD LANCET ULTRAFINE 30G) MISC    Insulin Pen Needle (B-D ULTRAFINE III SHORT PEN) 31G X 8 MM MISC    acetone, urine, test (KETOSTIX) strip    blood glucose test strips (TRUETEST TEST) strip    Insulin Degludec (TRESIBA FLEXTOUCH) 200 UNIT/ML SOPN    Microalbumin, Ur    Hemoglobin A1C    Lipid Panel    Basic Metabolic Panel   2.  Hypothyroidism, unspecified type  levothyroxine (SYNTHROID) 100 MCG tablet    TSH With Reflex Ft4     Orders Placed This Encounter   Procedures    Microalbumin, Ur    Hemoglobin A1C  Lipid Panel    Basic Metabolic Panel    TSH With Reflex Ft4     Orders Placed This Encounter   Medications    Lancets (BD LANCET ULTRAFINE 30G) MISC     Sig: Use to test blood sugar 5 times daily     Dispense:  200 each     Refill:  11    Insulin Pen Needle (B-D ULTRAFINE III SHORT PEN) 31G X 8 MM MISC     Sig: Use to inject insulin 6 times daily     Dispense:  200 each     Refill:  6    acetone, urine, test (KETOSTIX) strip     Sig: Test daily as needed for ketones     Dispense:  50 strip     Refill:  2     NEEDS TO MAKE AN APPT    blood glucose test strips (TRUETEST TEST) strip     Sig: Use to test blood sugars 5 times daily     Dispense:  200 each     Refill:  0     True Metrix brand    Insulin Degludec (TRESIBA FLEXTOUCH) 200 UNIT/ML SOPN     Sig: Inject 26 Units into the skin daily     Dispense:  3 pen     Refill:  3    levothyroxine (SYNTHROID) 100 MCG tablet     Sig: Take 1 tablet by mouth Daily     Dispense:  30 tablet     Refill:  0     Requested Prescriptions     Signed Prescriptions Disp Refills    Lancets (BD LANCET ULTRAFINE 30G) MISC 200 each 11     Sig: Use to test blood sugar 5 times daily    Insulin Pen Needle (B-D ULTRAFINE III SHORT PEN) 31G X 8 MM MISC 200 each 6     Sig: Use to inject insulin 6 times daily    acetone, urine, test (KETOSTIX) strip 50 strip 2     Sig: Test daily as needed for ketones    blood glucose test strips (TRUETEST TEST) strip 200 each 0     Sig: Use to test blood sugars 5 times daily    Insulin Degludec (TRESIBA FLEXTOUCH) 200 UNIT/ML SOPN 3 pen 3     Sig: Inject 26 Units into the skin daily    levothyroxine (SYNTHROID) 100 MCG tablet 30 tablet 0     Sig: Take 1 tablet by mouth Daily       1. Type 1 diabetes mellitus (HCC)  - Unstable  HbA1C goal is less than 7%. - Fasting blood glucose goal is 70-120mg/dl and postprandial blood sugar goal is less than 180 mg/dl. - Labs reviewed including most recent A1c, microalbumin and kidney function.  Repeat labs due in 3 months.    -We discussed in great detail dietary modifications they can make to better improve their blood sugars. -follow up diabetes education completed, all questions answered. CGM report downloaded and reviewed, scanned to media tab. Time in range 32% and hypoglycemia 0%. Average glucose 238 mg/dl. Discussed in insulin pump, information given on tslim and omnipod. Will try ultra-long acting insulin   Encouraged pt to document insulin intake, and accurate carb count and cut down on carbs. Discussed signs and symptoms of hyper/hypoglycemia and how to treat. Encouraged 150 minutes of physical activity per week. Follow a low carbohydrate diet. Encouraged at least 7 hours of sleep. The patient was informed of the goals of diabetes management. This can only be accomplished by watching their diet and exercise levels. We certainly use medicines to help attain these goals. The consequences of not controlling blood sugars were discussed. These include blindness, heart disease, stroke, kidney disease, and possibly need for dialysis. They were told to be careful with their foot care as diabetics often have nerve damage, infections and risk for limb amutations . They also need a dilated eye exam yearly. We discussed the issues of diet, exercise, medication, complication avoidance, reviewed the signs and symptoms of diabetes, hypoglycemic episodes, significance of HbA1C.       - Lancets (BD LANCET ULTRAFINE 30G) MISC; Use to test blood sugar 5 times daily  Dispense: 200 each; Refill: 11  - Insulin Pen Needle (B-D ULTRAFINE III SHORT PEN) 31G X 8 MM MISC; Use to inject insulin 6 times daily  Dispense: 200 each; Refill: 6  - acetone, urine, test (KETOSTIX) strip; Test daily as needed for ketones  Dispense: 50 strip; Refill: 2  - blood glucose test strips (TRUETEST TEST) strip; Use to test blood sugars 5 times daily  Dispense: 200 each;  Refill: 0  - Insulin Degludec (TRESIBA FLEXTOUCH) 200 UNIT/ML SOPN; Inject 26 Units into the skin daily  Dispense: 3 pen; Refill: 3  - Microalbumin, Ur; Future  - Hemoglobin A1C; Future  - Lipid Panel; Future  - Basic Metabolic Panel; Future    2. Hypothyroidism, unspecified type  -stable will refill until sees pcp.   - levothyroxine (SYNTHROID) 100 MCG tablet; Take 1 tablet by mouth Daily  Dispense: 30 tablet; Refill: 0  - TSH With Reflex Ft4; Future        Answered all patient questions. Agrees to follow plan of care and to follow up in 1 months, sooner if needed. Call office if unexplained blood sugars less than 70 occur or above 400. Call office or access [facility] with any further questions or concerns. Be sure to bring glucometer/food log at next appointment. Total time spent reviewing chart, labs, counseling patient and documenting on the date of the encounter: 30 min.       Electronically signed by MARIA D Edgar CNP on 4/27/2022 at 7:56 AM      (Please note that portions of this note were completed with a voice-recognition program. Efforts were made to edit the dictation but occasionally words are mis-transcribed.)

## 2022-04-27 ASSESSMENT — ENCOUNTER SYMPTOMS
RESPIRATORY NEGATIVE: 1
SHORTNESS OF BREATH: 0
DIARRHEA: 0
ABDOMINAL PAIN: 0

## 2022-04-28 ENCOUNTER — HOSPITAL ENCOUNTER (OUTPATIENT)
Dept: LAB | Age: 22
Discharge: HOME | End: 2022-04-28
Payer: COMMERCIAL

## 2022-04-28 DIAGNOSIS — E03.9 HYPOTHYROIDISM, UNSPECIFIED TYPE: ICD-10-CM

## 2022-04-28 DIAGNOSIS — E10.9 TYPE 1 DIABETES MELLITUS WITHOUT COMPLICATION (HCC): ICD-10-CM

## 2022-04-28 LAB
ANION GAP SERPL CALCULATED.3IONS-SCNC: 12 MMOL/L (ref 9–17)
BUN BLDV-MCNC: 13 MG/DL (ref 6–20)
BUN/CREAT BLD: 28 (ref 9–20)
CALCIUM SERPL-MCNC: 9.6 MG/DL (ref 8.6–10.4)
CHLORIDE BLD-SCNC: 106 MMOL/L (ref 98–107)
CHOLESTEROL/HDL RATIO: 5.2
CHOLESTEROL: 214 MG/DL
CO2: 22 MMOL/L (ref 20–31)
CREAT SERPL-MCNC: 0.46 MG/DL (ref 0.5–0.9)
GFR AFRICAN AMERICAN: >60 ML/MIN
GFR NON-AFRICAN AMERICAN: >60 ML/MIN
GFR SERPL CREATININE-BSD FRML MDRD: ABNORMAL ML/MIN/{1.73_M2}
GLUCOSE BLD-MCNC: 96 MG/DL (ref 70–99)
HDLC SERPL-MCNC: 41 MG/DL
LDL CHOLESTEROL: 127 MG/DL (ref 0–130)
POTASSIUM SERPL-SCNC: 4.2 MMOL/L (ref 3.7–5.3)
SODIUM BLD-SCNC: 140 MMOL/L (ref 135–144)
THYROXINE, FREE: 1.97 NG/DL (ref 0.93–1.7)
TRIGL SERPL-MCNC: 232 MG/DL
TSH SERPL DL<=0.05 MIU/L-ACNC: 0.07 UIU/ML (ref 0.3–5)

## 2022-04-28 PROCEDURE — 83036 HEMOGLOBIN GLYCOSYLATED A1C: CPT

## 2022-04-28 PROCEDURE — 80061 LIPID PANEL: CPT

## 2022-04-28 PROCEDURE — 84439 ASSAY OF FREE THYROXINE: CPT

## 2022-04-28 PROCEDURE — 80048 BASIC METABOLIC PNL TOTAL CA: CPT

## 2022-04-28 PROCEDURE — 84443 ASSAY THYROID STIM HORMONE: CPT

## 2022-04-28 PROCEDURE — 36415 COLL VENOUS BLD VENIPUNCTURE: CPT

## 2022-04-29 LAB
ESTIMATED AVERAGE GLUCOSE: 212 MG/DL
HBA1C MFR BLD: 9 % (ref 4–6)

## 2022-05-03 DIAGNOSIS — E03.9 HYPOTHYROIDISM, UNSPECIFIED TYPE: Primary | ICD-10-CM

## 2022-05-26 ENCOUNTER — OFFICE VISIT (OUTPATIENT)
Dept: DIABETES SERVICES | Age: 22
End: 2022-05-26
Payer: COMMERCIAL

## 2022-05-26 VITALS
HEIGHT: 65 IN | DIASTOLIC BLOOD PRESSURE: 62 MMHG | SYSTOLIC BLOOD PRESSURE: 98 MMHG | HEART RATE: 100 BPM | RESPIRATION RATE: 14 BRPM | WEIGHT: 173 LBS | BODY MASS INDEX: 28.82 KG/M2

## 2022-05-26 DIAGNOSIS — E03.9 HYPOTHYROIDISM, UNSPECIFIED TYPE: ICD-10-CM

## 2022-05-26 DIAGNOSIS — E10.9 TYPE 1 DIABETES MELLITUS WITHOUT COMPLICATION (HCC): Primary | ICD-10-CM

## 2022-05-26 PROCEDURE — 99214 OFFICE O/P EST MOD 30 MIN: CPT | Performed by: NURSE PRACTITIONER

## 2022-05-26 PROCEDURE — G8419 CALC BMI OUT NRM PARAM NOF/U: HCPCS | Performed by: NURSE PRACTITIONER

## 2022-05-26 PROCEDURE — 95251 CONT GLUC MNTR ANALYSIS I&R: CPT | Performed by: NURSE PRACTITIONER

## 2022-05-26 PROCEDURE — 1036F TOBACCO NON-USER: CPT | Performed by: NURSE PRACTITIONER

## 2022-05-26 PROCEDURE — 2022F DILAT RTA XM EVC RTNOPTHY: CPT | Performed by: NURSE PRACTITIONER

## 2022-05-26 PROCEDURE — 3046F HEMOGLOBIN A1C LEVEL >9.0%: CPT | Performed by: NURSE PRACTITIONER

## 2022-05-26 PROCEDURE — G8427 DOCREV CUR MEDS BY ELIG CLIN: HCPCS | Performed by: NURSE PRACTITIONER

## 2022-05-26 RX ORDER — INSULIN LISPRO 100 [IU]/ML
INJECTION, SOLUTION INTRAVENOUS; SUBCUTANEOUS
Status: ON HOLD | COMMUNITY
Start: 2022-05-05 | End: 2022-10-26 | Stop reason: SDUPTHER

## 2022-05-26 RX ORDER — INSULIN DEGLUDEC 200 U/ML
34 INJECTION, SOLUTION SUBCUTANEOUS DAILY
Qty: 3 PEN | Refills: 3
Start: 2022-05-26 | End: 2022-08-09 | Stop reason: SDUPTHER

## 2022-05-26 ASSESSMENT — ENCOUNTER SYMPTOMS
DIARRHEA: 0
SHORTNESS OF BREATH: 0
RESPIRATORY NEGATIVE: 1
ABDOMINAL PAIN: 0

## 2022-05-26 NOTE — PATIENT INSTRUCTIONS
Increase Tresiba to 34 units once daily   Document short acting insulin in dexcom   Consider insulin pump.

## 2022-05-26 NOTE — PROGRESS NOTES
[address], [address]  [facility] [address] [phone]        HISTORY:    Raúl Peoples presents today for evaluation and management of:  Chief Complaint   Patient presents with    Diabetes     1 month appt       HPI    Interval History:    Current Diabetic Medications  Tresiba 24 units in the am, humalog with meals ICR 10:1 with breakfast in lunch and 5:1 with dinner.      DKA episodes: 2009 DKA with diagnosis after H1N1 vaccine. 2012 DKA unknown cause.       04/27/22   Raúl Peoples is a 22 y.o. female patient who presents to clinic today for her diabetes. she has a history of wide excursions, hypothyroidism and celiac disease  which contributes to her diabetes. At previous visit IRC was adjsusted. she denies any current signs or symptoms of hyper/hypoglycemia. she states they are taking their medications as prescribed without any adverse effects. She continues to have wide fluctuations in blood sugars. Documents well. Diet: regular carb counting. Exercise: none  BS testing: uses cgm daily with success and is adherent to cgm therapy  Issues: wide fluctuations  Diabetic foot exam up-to-date: No  Diabetic retinal exam up-to-date: Yes  Hypoglycemia as needed treatment: emergency glucagon nasal spray    05/26/22   Raúl Peoples is a 22 y.o. female patient who presents to clinic today for her diabetes. she has a history of wide excursions, hypothyroidism and celiac disease which contributes to her diabetes. At previous visit changed basal to tresiba. she denies any current signs or symptoms of hyper/hypoglycemia. she states they are taking their medications as prescribed without any adverse effects. Diet: regular carb counting.   Exercise: none  BS testing: uses cgm daily with success and is adherent to cgm therapy  Issues: wide fluctuations  Diabetic foot exam up-to-date: No  Diabetic retinal exam up-to-date: Yes  Hypoglycemia as needed treatment: emergency glucagon nasal spray    Hypothyroidism- Fatigue is present denies any change in weight denies heat or cold intolerance denies mood changes does have menstrual irregularities denies weakness or tremor denies constipation or diarrhea denies any neck pain or change in voice denies any sleep issues. Past Medical History:   Diagnosis Date    Abdominal pain     Abnormal stools     Celiac disease 2019    Chronic lymphocytic thyroiditis     Delivery by emergency  section 2022    Diabetic ketoacidosis (HCC)     Hyperopia with astigmatism     Hypothyroid     Insomnia     Migraine     Precocious adrenarche ([address]     Precocious puberty     Seasonal allergies     Seizures (HCC)     D/T BS out of control, no seizure disorder    Type 1 diabetes mellitus without (mention of) complication     date of diagnosis - 2/15/2010     Family History   Problem Relation Age of Onset    Diabetes Maternal Grandfather     Retinal Detachment Maternal Grandfather     Diabetes Father         borderline    Neuropathy Father     Heart Disease Paternal Grandmother     Asthma Paternal Grandmother     Other Paternal Grandmother         edema and history of hemophilia    Cancer Maternal Grandmother 72        breast cancer    Hypertension Other         paternal side    Diabetes Other         paternal side     Glaucoma Neg Hx     Cataracts Neg Hx      Social History     Tobacco Use    Smoking status: Passive Smoke Exposure - Never Smoker    Smokeless tobacco: Never Used    Tobacco comment: Passive smoke exposure.    Substance Use Topics    Alcohol use: No     Alcohol/week: 0.0 standard drinks    Drug use: Not Currently     Types: Marijuana [place])     Comment: last use , stopped     Allergies   Allergen Reactions    Oxycodone Hives and Itching    Gluten Meal     Insulin Detemir Other (See Comments), Swelling and Hives     Local reaction  Local reaction    Other Other (See Comments)     flouride  Causes hives  flouride  Causes hives    Codeine Hives, Nausea And Vomiting and Rash     Other reaction(s): Vomiting       MEDICATIONS:  Current Outpatient Medications   Medication Sig Dispense Refill    Insulin Degludec (TRESIBA FLEXTOUCH) 200 UNIT/ML SOPN Inject 34 Units into the skin daily 3 pen 3    levothyroxine (SYNTHROID) 100 MCG tablet Take 1 tablet by mouth Daily 30 tablet 0    vitamin D (CHOLECALCIFEROL) 25 MCG (1000 UT) TABS tablet Take 1,000 Units by mouth daily      norethindrone (JOLEEN) 0.35 MG tablet Take 1 tablet by mouth daily 28 tablet 11    cetirizine (ZYRTEC) 10 MG tablet TAKE ONE TABLET BY MOUTH EVERY DAY 30 tablet 6    insulin lispro, 1 Unit Dial, 100 UNIT/ML SOPN Take 3 times daily. Max daily dose of      Lancets (BD LANCET ULTRAFINE 30G) MISC Use to test blood sugar 5 times daily 200 each 11    Insulin Pen Needle (B-D ULTRAFINE III SHORT PEN) 31G X 8 MM MISC Use to inject insulin 6 times daily 200 each 6    acetone, urine, test (KETOSTIX) strip Test daily as needed for ketones 50 strip 2    blood glucose test strips (TRUETEST TEST) strip Use to test blood sugars 5 times daily 200 each 0    BAQSIMI ONE PACK 3 MG/DOSE POWD       B-D INS SYR ULTRAFINE 1CC/31G 31G X 5/16\" 1 ML MISC       ULTRA-COMFORT INSULIN SYRINGE 31G X 5/16\" 0.5 ML MISC USE ONCE DAILY WITH LANTUS 30 each 3    glucagon (GLUCAGON EMERGENCY) 1 MG injection As directed for extreme hypoglycemia (Patient not taking: Reported on 2/28/2022) 1 kit 2     No current facility-administered medications for this visit. Review ofSymptoms:  Review of Systems   Constitutional: Positive for fatigue. Negative for unexpected weight change. Eyes: Negative for visual disturbance. Respiratory: Negative. Negative for shortness of breath. Cardiovascular: Negative for chest pain and leg swelling.    Gastrointestinal: Negative for abdominal pain and diarrhea. Endocrine: Negative for polydipsia, polyphagia and polyuria. Genitourinary: Negative. Musculoskeletal: Negative. Skin: Negative for rash and wound. Neurological: Negative for dizziness, tremors, seizures and headaches. Psychiatric/Behavioral: Negative. Negative for confusion and decreased concentration. Theremainder of a complete 14-point review of systems is negative. Vital Signs: BP 98/62 (Site: Left Upper Arm, Position: Sitting, Cuff Size: Large Adult)   Pulse 100   Resp 14   Ht 5' 5.25\" (1.657 m)   Wt 173 lb (78.5 kg)   LMP 05/20/2022   BMI 28.57 kg/m²      Wt Readings from Last 3 Encounters:   05/26/22 173 lb (78.5 kg)   04/26/22 166 lb (75.3 kg)   03/22/22 166 lb (75.3 kg)     Body mass index is 28.57 kg/m².   LABS:  Hemoglobin A1C   Date Value Ref Range Status   04/28/2022 9.0 (H) 4.0 - 6.0 % Final   06/30/2021 8.2 (H) 4.0 - 6.0 % Final     Lab Results   Component Value Date    LABMICR CANNOT BE CALCULATED 09/22/2021     Lab Results   Component Value Date     04/28/2022    K 4.2 04/28/2022     04/28/2022    CO2 22 04/28/2022    BUN 13 04/28/2022    CREATININE 0.46 (L) 04/28/2022    GLUCOSE 96 04/28/2022    CALCIUM 9.6 04/28/2022    PROT 7.5 06/30/2021    LABALBU 4.5 06/30/2021    BILITOT 0.60 06/30/2021    ALKPHOS 84 06/30/2021    AST 14 06/30/2021    ALT 11 06/30/2021    LABGLOM >60 04/28/2022    GFRAA >60 04/28/2022     Lab Results   Component Value Date    CHOL 214 (H) 04/28/2022    CHOL 124 09/01/2020    CHOL 174 07/25/2019     Lab Results   Component Value Date    TRIG 232 (H) 04/28/2022    TRIG 142 09/01/2020    TRIG 155 (H) 07/25/2019     Lab Results   Component Value Date    HDL 41 04/28/2022    HDL 31 (L) 09/01/2020    HDL 33 (L) 07/25/2019     Lab Results   Component Value Date    LDLCHOLESTEROL 127 04/28/2022    LDLCHOLESTEROL 65 09/01/2020    LDLCHOLESTEROL 110 07/25/2019     Lab Results   Component Value Date    VLDL NOT REPORTED 09/01/2020 VLDL NOT REPORTED (H) 07/25/2019    VLDL NOT REPORTED (H) 02/15/2019     Lab Results   Component Value Date    CHOLHDLRATIO 5.2 (H) 04/28/2022    CHOLHDLRATIO 4.0 09/01/2020    CHOLHDLRATIO 5.3 (H) 07/25/2019           Physical Exam  Constitutional:       Appearance: She is well-developed. Eyes:      Pupils: Pupils are equal, round, and reactive to light. Neck:      Thyroid: No thyroid mass, thyromegaly or thyroid tenderness. Cardiovascular:      Rate and Rhythm: Normal rate and regular rhythm. Heart sounds: Normal heart sounds. Pulmonary:      Effort: Pulmonary effort is normal.      Breath sounds: Normal breath sounds. Abdominal:      General: Bowel sounds are normal.      Palpations: Abdomen is soft. Skin:     General: Skin is warm and dry. Comments: Negative for open/nonhealing wounds. Negative for lipohypertrophy. Neurological:      Mental Status: She is alert and oriented to person, place, and time. ASSESSMENT/PLAN:     Diagnosis Orders   1. Type 1 diabetes mellitus without complication (HCC)  Insulin Degludec (TRESIBA FLEXTOUCH) 200 UNIT/ML SOPN   2. Hypothyroidism, unspecified type       No orders of the defined types were placed in this encounter. Orders Placed This Encounter   Medications    Insulin Degludec (TRESIBA FLEXTOUCH) 200 UNIT/ML SOPN     Sig: Inject 34 Units into the skin daily     Dispense:  3 pen     Refill:  3     Requested Prescriptions     Signed Prescriptions Disp Refills    Insulin Degludec (TRESIBA FLEXTOUCH) 200 UNIT/ML SOPN 3 pen 3     Sig: Inject 34 Units into the skin daily       1. Type 1 diabetes mellitus without complication (HCC)  - Unstable  HbA1C goal is less than 7%. - Fasting blood glucose goal is 70-120mg/dl and postprandial blood sugar goal is less than 180 mg/dl. - Labs reviewed including most recent A1c, microalbumin and kidney function.  Repeat labs due in 3 months.    -We discussed in great detail dietary modifications they can MARIA D - CNP on 5/26/2022 at 2:09 PM      (Please note that portions of this note were completed with a voice-recognition program. Efforts were made to edit the dictation but occasionally words are mis-transcribed.)

## 2022-06-09 DIAGNOSIS — E03.9 HYPOTHYROIDISM, UNSPECIFIED TYPE: Primary | ICD-10-CM

## 2022-06-09 NOTE — PROGRESS NOTES
Called and reminded patient to come in and complete TSH. Understands and will come complete at her earliest convenience.

## 2022-06-27 ENCOUNTER — OFFICE VISIT (OUTPATIENT)
Dept: DIABETES SERVICES | Age: 22
End: 2022-06-27
Payer: COMMERCIAL

## 2022-06-27 VITALS
HEIGHT: 65 IN | SYSTOLIC BLOOD PRESSURE: 102 MMHG | HEART RATE: 100 BPM | DIASTOLIC BLOOD PRESSURE: 82 MMHG | RESPIRATION RATE: 14 BRPM | WEIGHT: 173 LBS | BODY MASS INDEX: 28.82 KG/M2

## 2022-06-27 DIAGNOSIS — E10.9 TYPE 1 DIABETES MELLITUS WITHOUT COMPLICATION (HCC): Primary | ICD-10-CM

## 2022-06-27 DIAGNOSIS — E03.9 HYPOTHYROIDISM, UNSPECIFIED TYPE: ICD-10-CM

## 2022-06-27 PROCEDURE — 3046F HEMOGLOBIN A1C LEVEL >9.0%: CPT | Performed by: NURSE PRACTITIONER

## 2022-06-27 PROCEDURE — G8427 DOCREV CUR MEDS BY ELIG CLIN: HCPCS | Performed by: NURSE PRACTITIONER

## 2022-06-27 PROCEDURE — G8419 CALC BMI OUT NRM PARAM NOF/U: HCPCS | Performed by: NURSE PRACTITIONER

## 2022-06-27 PROCEDURE — 99214 OFFICE O/P EST MOD 30 MIN: CPT | Performed by: NURSE PRACTITIONER

## 2022-06-27 PROCEDURE — 95251 CONT GLUC MNTR ANALYSIS I&R: CPT | Performed by: NURSE PRACTITIONER

## 2022-06-27 PROCEDURE — 1036F TOBACCO NON-USER: CPT | Performed by: NURSE PRACTITIONER

## 2022-06-27 PROCEDURE — 2022F DILAT RTA XM EVC RTNOPTHY: CPT | Performed by: NURSE PRACTITIONER

## 2022-06-27 PROCEDURE — 99214 OFFICE O/P EST MOD 30 MIN: CPT

## 2022-06-27 ASSESSMENT — ENCOUNTER SYMPTOMS
DIARRHEA: 0
ABDOMINAL PAIN: 0
RESPIRATORY NEGATIVE: 1
SHORTNESS OF BREATH: 0

## 2022-06-27 NOTE — PROGRESS NOTES
nasal spray    Hypothyroidism- taking synthroid 100 mcg daily. Fatigue is present denies any change in weight denies heat or cold intolerance denies mood changes does have menstrual irregularities denies weakness or tremor denies constipation or diarrhea denies any neck pain or change in voice denies any sleep issues. Obesity- Working on weight loss. Past Medical History:   Diagnosis Date    Abdominal pain     Abnormal stools     Celiac disease 2019    Chronic lymphocytic thyroiditis     Delivery by emergency  section 2022    Diabetic ketoacidosis (HCC)     Hyperopia with astigmatism     Hypothyroid     Insomnia     Migraine     Precocious adrenarche ([address]     Precocious puberty     Seasonal allergies     Seizures (HCC)     D/T BS out of control, no seizure disorder    Type 1 diabetes mellitus without (mention of) complication     date of diagnosis - 2/15/2010     Family History   Problem Relation Age of Onset    Diabetes Maternal Grandfather     Retinal Detachment Maternal Grandfather     Diabetes Father         borderline    Neuropathy Father     Heart Disease Paternal Grandmother     Asthma Paternal Grandmother     Other Paternal Grandmother         edema and history of hemophilia    Cancer Maternal Grandmother 72        breast cancer    Hypertension Other         paternal side    Diabetes Other         paternal side     Glaucoma Neg Hx     Cataracts Neg Hx      Social History     Tobacco Use    Smoking status: Passive Smoke Exposure - Never Smoker    Smokeless tobacco: Never Used    Tobacco comment: Passive smoke exposure.    Substance Use Topics    Alcohol use: No     Alcohol/week: 0.0 standard drinks    Drug use: Not Currently     Types: Marijuana [place])     Comment: last use , stopped     Allergies   Allergen Reactions    Oxycodone Hives and Itching    Gluten Meal     Insulin Detemir Other (See Comments), Swelling and Hives     Local reaction  Local reaction    Other Other (See Comments)     flouride  Causes hives  flouride  Causes hives    Codeine Hives, Nausea And Vomiting and Rash     Other reaction(s): Vomiting       MEDICATIONS:  Current Outpatient Medications   Medication Sig Dispense Refill    Insulin Degludec (TRESIBA FLEXTOUCH) 200 UNIT/ML SOPN Inject 34 Units into the skin daily 3 pen 3    levothyroxine (SYNTHROID) 100 MCG tablet Take 1 tablet by mouth Daily 30 tablet 0    vitamin D (CHOLECALCIFEROL) 25 MCG (1000 UT) TABS tablet Take 1,000 Units by mouth daily      norethindrone (JOLEEN) 0.35 MG tablet Take 1 tablet by mouth daily 28 tablet 11    cetirizine (ZYRTEC) 10 MG tablet TAKE ONE TABLET BY MOUTH EVERY DAY 30 tablet 6    insulin lispro, 1 Unit Dial, 100 UNIT/ML SOPN Take 3 times daily. Max daily dose of      Lancets (BD LANCET ULTRAFINE 30G) MISC Use to test blood sugar 5 times daily 200 each 11    Insulin Pen Needle (B-D ULTRAFINE III SHORT PEN) 31G X 8 MM MISC Use to inject insulin 6 times daily 200 each 6    acetone, urine, test (KETOSTIX) strip Test daily as needed for ketones 50 strip 2    blood glucose test strips (TRUETEST TEST) strip Use to test blood sugars 5 times daily 200 each 0    BAQSIMI ONE PACK 3 MG/DOSE POWD       B-D INS SYR ULTRAFINE 1CC/31G 31G X 5/16\" 1 ML MISC       ULTRA-COMFORT INSULIN SYRINGE 31G X 5/16\" 0.5 ML MISC USE ONCE DAILY WITH LANTUS 30 each 3    glucagon (GLUCAGON EMERGENCY) 1 MG injection As directed for extreme hypoglycemia (Patient not taking: Reported on 2/28/2022) 1 kit 2     No current facility-administered medications for this visit. Review ofSymptoms:  Review of Systems   Constitutional: Positive for fatigue. Negative for unexpected weight change. Eyes: Negative for visual disturbance. Respiratory: Negative. Negative for shortness of breath. Cardiovascular: Negative for chest pain and leg swelling.    Gastrointestinal: Negative for abdominal pain and diarrhea. Endocrine: Negative for polydipsia, polyphagia and polyuria. Genitourinary: Negative. Musculoskeletal: Negative. Skin: Negative for rash and wound. Neurological: Negative for dizziness, tremors, seizures and headaches. Psychiatric/Behavioral: Negative. Negative for confusion and decreased concentration. Theremainder of a complete 14-point review of systems is negative. Vital Signs: /82 (Site: Right Upper Arm, Position: Sitting, Cuff Size: Large Adult)   Pulse 100   Resp 14   Ht 5' 5.25\" (1.657 m)   Wt 173 lb (78.5 kg)   LMP 06/06/2022   BMI 28.57 kg/m²      Wt Readings from Last 3 Encounters:   06/27/22 173 lb (78.5 kg)   05/26/22 173 lb (78.5 kg)   04/26/22 166 lb (75.3 kg)     Body mass index is 28.57 kg/m².   LABS:  Hemoglobin A1C   Date Value Ref Range Status   04/28/2022 9.0 (H) 4.0 - 6.0 % Final   06/30/2021 8.2 (H) 4.0 - 6.0 % Final     Lab Results   Component Value Date    LABMICR CANNOT BE CALCULATED 09/22/2021     Lab Results   Component Value Date     04/28/2022    K 4.2 04/28/2022     04/28/2022    CO2 22 04/28/2022    BUN 13 04/28/2022    CREATININE 0.46 (L) 04/28/2022    GLUCOSE 96 04/28/2022    CALCIUM 9.6 04/28/2022    PROT 7.5 06/30/2021    LABALBU 4.5 06/30/2021    BILITOT 0.60 06/30/2021    ALKPHOS 84 06/30/2021    AST 14 06/30/2021    ALT 11 06/30/2021    LABGLOM >60 04/28/2022    GFRAA >60 04/28/2022     Lab Results   Component Value Date    CHOL 214 (H) 04/28/2022    CHOL 124 09/01/2020    CHOL 174 07/25/2019     Lab Results   Component Value Date    TRIG 232 (H) 04/28/2022    TRIG 142 09/01/2020    TRIG 155 (H) 07/25/2019     Lab Results   Component Value Date    HDL 41 04/28/2022    HDL 31 (L) 09/01/2020    HDL 33 (L) 07/25/2019     Lab Results   Component Value Date    LDLCHOLESTEROL 127 04/28/2022    LDLCHOLESTEROL 65 09/01/2020    LDLCHOLESTEROL 110 07/25/2019     Lab Results   Component Value Date    VLDL NOT REPORTED 09/01/2020 VLDL NOT REPORTED (H) 07/25/2019    VLDL NOT REPORTED (H) 02/15/2019     Lab Results   Component Value Date    CHOLHDLRATIO 5.2 (H) 04/28/2022    CHOLHDLRATIO 4.0 09/01/2020    CHOLHDLRATIO 5.3 (H) 07/25/2019           Physical Exam  Constitutional:       Appearance: She is well-developed. Eyes:      Pupils: Pupils are equal, round, and reactive to light. Neck:      Thyroid: No thyroid mass, thyromegaly or thyroid tenderness. Cardiovascular:      Rate and Rhythm: Normal rate and regular rhythm. Heart sounds: Normal heart sounds. Pulmonary:      Effort: Pulmonary effort is normal.      Breath sounds: Normal breath sounds. Abdominal:      General: Bowel sounds are normal.      Palpations: Abdomen is soft. Skin:     General: Skin is warm and dry. Comments: Negative for open/nonhealing wounds. Negative for lipohypertrophy. Neurological:      Mental Status: She is alert and oriented to person, place, and time. Diabetic foot exam:   Left Foot:   Visual Exam: normal   Pulse DP: 2+ (normal)   Filament test: normal sensation   Vibratory Sensation: normal  Right Foot:   Visual Exam: normal   Pulse DP: 2+ (normal)   Filament test: normal sensation   Vibratory Sensation: normal     ASSESSMENT/PLAN:     Diagnosis Orders   1. Type 1 diabetes mellitus without complication (HCC)   DIABETES FOOT EXAM    Hemoglobin A1C   2. Hypothyroidism, unspecified type     3. BMI 28.0-28.9,adult       Orders Placed This Encounter   Procedures    Hemoglobin A1C    HM DIABETES FOOT EXAM     No orders of the defined types were placed in this encounter. Requested Prescriptions      No prescriptions requested or ordered in this encounter       1. Type 1 diabetes mellitus without complication (HCC)  - Unstable  HbA1C goal is less than 7%. - Fasting blood glucose goal is 70-120mg/dl and postprandial blood sugar goal is less than 180 mg/dl.    - Labs reviewed including most recent A1c, microalbumin and kidney function. Repeat labs due in 3 months.    -We discussed in great detail dietary modifications they can make to better improve their blood sugars. -follow up diabetes education completed, all questions answered. CGM report downloaded and reviewed, scanned to media tab. Time in range 20% and hypoglycemia 1%. Average glucose 267 mgl/dl. -will work on lifestyle but may need to increase basal insulin. Recommend insulin pump but pt declines at this time. Patient Instructions   Work on low cab diet 45 grams or less per meal   Increase hydration   document carbs and insulin in dexcom   Consider insulin pump           Discussed signs and symptoms of hyper/hypoglycemia and how to treat. Encouraged 150 minutes of physical activity per week. Follow a low carbohydrate diet. Encouraged at least 7 hours of sleep. The patient was informed of the goals of diabetes management. This can only be accomplished by watching their diet and exercise levels. We certainly use medicines to help attain these goals. The consequences of not controlling blood sugars were discussed. These include blindness, heart disease, stroke, kidney disease, and possibly need for dialysis. They were told to be careful with their foot care as diabetics often have nerve damage, infections and risk for limb amutations . They also need a dilated eye exam yearly. We discussed the issues of diet, exercise, medication, complication avoidance, reviewed the signs and symptoms of diabetes, hypoglycemic episodes, significance of HbA1C.       -  DIABETES FOOT EXAM  - Hemoglobin A1C; Future    2. Hypothyroidism, unspecified type  -stable no changes to current medications. Continue to monitor     3. BMI 28.0-28.9,adult  Reduce calories and increase physical activity to achieve a slow and steady weight loss to improve blood pressure, cholesterol and diabetes. Answered all patient questions.  Agrees to follow plan of care and to follow up in 1 months, sooner if needed. Call office if unexplained blood sugars less than 70 occur or above 400. Call office or access MyChart with any further questions or concerns. Be sure to bring glucometer/food log at next appointment. Total time spent reviewing chart, labs, counseling patient and documenting on the date of the encounter: 30 min.       Electronically signed by MARIA D Tinsley CNP on 6/27/2022 at 1:17 PM      (Please note that portions of this note were completed with a voice-recognition program. Efforts were made to edit the dictation but occasionally words are mis-transcribed.)

## 2022-06-27 NOTE — PATIENT INSTRUCTIONS
Work on low cab diet 45 grams or less per meal   Increase hydration   document carbs and insulin in dexcom   Consider insulin pump

## 2022-08-09 ENCOUNTER — OFFICE VISIT (OUTPATIENT)
Dept: DIABETES SERVICES | Age: 22
End: 2022-08-09
Payer: COMMERCIAL

## 2022-08-09 ENCOUNTER — HOSPITAL ENCOUNTER (OUTPATIENT)
Dept: LAB | Age: 22
Discharge: HOME | End: 2022-08-09
Payer: COMMERCIAL

## 2022-08-09 VITALS
BODY MASS INDEX: 29.66 KG/M2 | HEART RATE: 92 BPM | RESPIRATION RATE: 16 BRPM | SYSTOLIC BLOOD PRESSURE: 112 MMHG | HEIGHT: 65 IN | WEIGHT: 178 LBS | DIASTOLIC BLOOD PRESSURE: 68 MMHG

## 2022-08-09 DIAGNOSIS — E03.9 HYPOTHYROIDISM, UNSPECIFIED TYPE: ICD-10-CM

## 2022-08-09 DIAGNOSIS — E10.9 TYPE 1 DIABETES MELLITUS WITHOUT COMPLICATION (HCC): ICD-10-CM

## 2022-08-09 DIAGNOSIS — E10.9 TYPE 1 DIABETES MELLITUS WITHOUT COMPLICATION (HCC): Primary | ICD-10-CM

## 2022-08-09 LAB
THYROXINE, FREE: 1.33 NG/DL (ref 0.93–1.7)
TSH SERPL DL<=0.05 MIU/L-ACNC: 13.89 UIU/ML (ref 0.3–5)

## 2022-08-09 PROCEDURE — 36415 COLL VENOUS BLD VENIPUNCTURE: CPT

## 2022-08-09 PROCEDURE — G8419 CALC BMI OUT NRM PARAM NOF/U: HCPCS | Performed by: NURSE PRACTITIONER

## 2022-08-09 PROCEDURE — 3046F HEMOGLOBIN A1C LEVEL >9.0%: CPT | Performed by: NURSE PRACTITIONER

## 2022-08-09 PROCEDURE — 99214 OFFICE O/P EST MOD 30 MIN: CPT | Performed by: NURSE PRACTITIONER

## 2022-08-09 PROCEDURE — G8427 DOCREV CUR MEDS BY ELIG CLIN: HCPCS | Performed by: NURSE PRACTITIONER

## 2022-08-09 PROCEDURE — 1036F TOBACCO NON-USER: CPT | Performed by: NURSE PRACTITIONER

## 2022-08-09 PROCEDURE — 84439 ASSAY OF FREE THYROXINE: CPT

## 2022-08-09 PROCEDURE — 84443 ASSAY THYROID STIM HORMONE: CPT

## 2022-08-09 PROCEDURE — 82570 ASSAY OF URINE CREATININE: CPT

## 2022-08-09 PROCEDURE — 2022F DILAT RTA XM EVC RTNOPTHY: CPT | Performed by: NURSE PRACTITIONER

## 2022-08-09 PROCEDURE — 95251 CONT GLUC MNTR ANALYSIS I&R: CPT | Performed by: NURSE PRACTITIONER

## 2022-08-09 PROCEDURE — 82043 UR ALBUMIN QUANTITATIVE: CPT

## 2022-08-09 PROCEDURE — 83036 HEMOGLOBIN GLYCOSYLATED A1C: CPT

## 2022-08-09 RX ORDER — INSULIN DEGLUDEC 200 U/ML
40 INJECTION, SOLUTION SUBCUTANEOUS DAILY
Qty: 3 PEN | Refills: 3 | Status: ON HOLD | OUTPATIENT
Start: 2022-08-09 | End: 2022-10-26 | Stop reason: SDUPTHER

## 2022-08-09 RX ORDER — INSULIN DEGLUDEC 200 U/ML
40 INJECTION, SOLUTION SUBCUTANEOUS DAILY
Qty: 3 PEN | Refills: 3 | Status: SHIPPED | OUTPATIENT
Start: 2022-08-09 | End: 2022-08-09 | Stop reason: SDUPTHER

## 2022-08-09 ASSESSMENT — ENCOUNTER SYMPTOMS
DIARRHEA: 0
ABDOMINAL PAIN: 0
RESPIRATORY NEGATIVE: 1
SHORTNESS OF BREATH: 0

## 2022-08-09 NOTE — PROGRESS NOTES
[address], [address]  [facility] [id number] [phone]        HISTORY:    Aldo Tinoco presents today for evaluation and management of:  Chief Complaint   Patient presents with    Diabetes    1 Month Follow-Up         Interval History:       Current Diabetic Medications  Tresiba 34 units in the am, humalog with meals ICR 10:1 with breakfast in lunch and 5:1 with dinner. DKA episodes: 2009 DKA with diagnosis after H1N1 vaccine. 2012 DKA unknown cause. 06/27/22   Aldo Tinoco is a 22 y.o. female patient who presents to clinic today for her diabetes. she has a history of wide excursions, hypothyroidism and celiac disease which contributes to her diabetes. At previous visit insulin was increased. she denies any current signs or symptoms of hyper/hypoglycemia. she states they are taking their medications as prescribed without any adverse effects. Diet: regular carb counting. Exercise: none  BS testing: uses cgm daily with success and is adherent to cgm therapy  Issues: wide fluctuations  Diabetic foot exam up-to-date: No  Diabetic retinal exam up-to-date: Yes  Hypoglycemia as needed treatment: emergency glucagon nasal spray    08/09/22   Aldo Tinoco is a 22 y.o. female patient who presents to clinic today for her diabetes. she has a history of wide excursions, hypothyroidism and celiac disease which contributes to her diabetes. At previous visit insulin was increased. she denies any current signs or symptoms of hyper/hypoglycemia. she states they are taking their medications as prescribed without any adverse effects. Diet: regular carb counting.   Exercise: none  BS testing: uses cgm daily with success and is adherent to cgm therapy  Issues: wide fluctuations  Diabetic foot exam up-to-date: No  Diabetic retinal exam up-to-date: Yes  Hypoglycemia as needed treatment: emergency glucagon nasal spray        Hypothyroidism- taking synthroid 100 mcg daily. Fatigue is present, states she  now feels more cold than others, denies mood changes does have menstrual irregularities denies weakness or tremor denies constipation or diarrhea denies any neck pain or change in voice denies any sleep issues. Obesity- Working on weight loss. Past Medical History:   Diagnosis Date    Abdominal pain     Abnormal stools     Celiac disease 2019    Chronic lymphocytic thyroiditis     Delivery by emergency  section 2022    Diabetic ketoacidosis ([address]     Hyperopia with astigmatism     Hypothyroid     Insomnia     Migraine     Precocious adrenarche ([address]     Precocious puberty     Seasonal allergies     Seizures ([address]     D/T BS out of control, no seizure disorder    Type 1 diabetes mellitus without (mention of) complication     date of diagnosis - 2/15/2010     Family History   Problem Relation Age of Onset    Diabetes Maternal Grandfather     Retinal Detachment Maternal Grandfather     Diabetes Father         borderline    Neuropathy Father     Heart Disease Paternal Grandmother     Asthma Paternal Grandmother     Other Paternal Grandmother         edema and history of hemophilia    Cancer Maternal Grandmother 72        breast cancer    Hypertension Other         paternal side    Diabetes Other         paternal side     Glaucoma Neg Hx     Cataracts Neg Hx      Social History     Tobacco Use    Smoking status: Never     Passive exposure: Yes    Smokeless tobacco: Never    Tobacco comments:     Passive smoke exposure.    Substance Use Topics    Alcohol use: No     Alcohol/week: 0.0 standard drinks    Drug use: Not Currently     Types: Marijuana Be Leyva     Comment: last use , stopped     Allergies   Allergen Reactions    Oxycodone Hives and Itching    Gluten Meal     Insulin Detemir Other (See Comments), Swelling and Hives     Local reaction  Local reaction    Other Other (See Comments) flouride  Causes hives  flouride  Causes hives    Codeine Hives, Nausea And Vomiting and Rash     Other reaction(s): Vomiting       MEDICATIONS:  Current Outpatient Medications   Medication Sig Dispense Refill    Insulin Degludec (TRESIBA FLEXTOUCH) 200 UNIT/ML SOPN Inject 40 Units into the skin daily 3 pen 3    insulin lispro, 1 Unit Dial, 100 UNIT/ML SOPN Take 3 times daily. Max daily dose of      levothyroxine (SYNTHROID) 100 MCG tablet Take 1 tablet by mouth Daily 30 tablet 0    vitamin D (CHOLECALCIFEROL) 25 MCG (1000 UT) TABS tablet Take 1,000 Units by mouth daily      norethindrone (JOLEEN) 0.35 MG tablet Take 1 tablet by mouth daily 28 tablet 11    cetirizine (ZYRTEC) 10 MG tablet TAKE ONE TABLET BY MOUTH EVERY DAY 30 tablet 6    glucagon (GLUCAGON EMERGENCY) 1 MG injection As directed for extreme hypoglycemia 1 kit 2    Lancets (BD LANCET ULTRAFINE 30G) MISC Use to test blood sugar 5 times daily 200 each 11    Insulin Pen Needle (B-D ULTRAFINE III SHORT PEN) 31G X 8 MM MISC Use to inject insulin 6 times daily 200 each 6    acetone, urine, test (KETOSTIX) strip Test daily as needed for ketones 50 strip 2    blood glucose test strips (TRUETEST TEST) strip Use to test blood sugars 5 times daily 200 each 0    BAQSIMI ONE PACK 3 MG/DOSE POWD       B-D INS SYR ULTRAFINE 1CC/31G 31G X 5/16\" 1 ML MISC       ULTRA-COMFORT INSULIN SYRINGE 31G X 5/16\" 0.5 ML MISC USE ONCE DAILY WITH LANTUS 30 each 3     No current facility-administered medications for this visit. Review ofSymptoms:  Review of Systems   Constitutional:  Positive for fatigue. Negative for unexpected weight change. Eyes:  Negative for visual disturbance. Respiratory: Negative. Negative for shortness of breath. Cardiovascular:  Negative for chest pain and leg swelling. Gastrointestinal:  Negative for abdominal pain and diarrhea. Endocrine: Negative for polydipsia, polyphagia and polyuria. Genitourinary: Negative.     Musculoskeletal: Negative. Skin:  Negative for rash and wound. Neurological:  Negative for dizziness, tremors, seizures and headaches. Psychiatric/Behavioral: Negative. Negative for confusion and decreased concentration. Theremainder of a complete 14-point review of systems is negative. Vital Signs: /68 (Site: Right Upper Arm, Position: Sitting, Cuff Size: Large Adult)   Pulse 92   Resp 16   Ht 5' 5.25\" (1.657 m)   Wt 178 lb (80.7 kg)   BMI 29.39 kg/m²      Wt Readings from Last 3 Encounters:   08/09/22 178 lb (80.7 kg)   06/27/22 173 lb (78.5 kg)   05/26/22 173 lb (78.5 kg)     Body mass index is 29.39 kg/m².   LABS:  Hemoglobin A1C   Date Value Ref Range Status   04/28/2022 9.0 (H) 4.0 - 6.0 % Final   06/30/2021 8.2 (H) 4.0 - 6.0 % Final     Lab Results   Component Value Date    LABMICR CANNOT BE CALCULATED 09/22/2021     Lab Results   Component Value Date     04/28/2022    K 4.2 04/28/2022     04/28/2022    CO2 22 04/28/2022    BUN 13 04/28/2022    CREATININE 0.46 (L) 04/28/2022    GLUCOSE 96 04/28/2022    CALCIUM 9.6 04/28/2022    PROT 7.5 06/30/2021    LABALBU 4.5 06/30/2021    BILITOT 0.60 06/30/2021    ALKPHOS 84 06/30/2021    AST 14 06/30/2021    ALT 11 06/30/2021    LABGLOM >60 04/28/2022    GFRAA >60 04/28/2022     Lab Results   Component Value Date    CHOL 214 (H) 04/28/2022    CHOL 124 09/01/2020    CHOL 174 07/25/2019     Lab Results   Component Value Date    TRIG 232 (H) 04/28/2022    TRIG 142 09/01/2020    TRIG 155 (H) 07/25/2019     Lab Results   Component Value Date    HDL 41 04/28/2022    HDL 31 (L) 09/01/2020    HDL 33 (L) 07/25/2019     Lab Results   Component Value Date    LDLCHOLESTEROL 127 04/28/2022    LDLCHOLESTEROL 65 09/01/2020    LDLCHOLESTEROL 110 07/25/2019     Lab Results   Component Value Date    VLDL NOT REPORTED 09/01/2020    VLDL NOT REPORTED (H) 07/25/2019    VLDL NOT REPORTED (H) 02/15/2019     Lab Results   Component Value Date    CHOLHDLRATIO 5.2 (H) 04/28/2022    CHOLHDLRATIO 4.0 09/01/2020    CHOLHDLRATIO 5.3 (H) 07/25/2019           Physical Exam  Constitutional:       Appearance: She is well-developed. Eyes:      Pupils: Pupils are equal, round, and reactive to light. Neck:      Thyroid: No thyroid mass, thyromegaly or thyroid tenderness. Cardiovascular:      Rate and Rhythm: Normal rate and regular rhythm. Heart sounds: Normal heart sounds. Pulmonary:      Effort: Pulmonary effort is normal.      Breath sounds: Normal breath sounds. Abdominal:      General: Bowel sounds are normal.      Palpations: Abdomen is soft. Skin:     General: Skin is warm and dry. Comments: Negative for open/nonhealing wounds. Negative for lipohypertrophy. Neurological:      Mental Status: She is alert and oriented to person, place, and time. ASSESSMENT/PLAN:     Diagnosis Orders   1. Type 1 diabetes mellitus without complication (HCC)  Insulin Degludec (TRESIBA FLEXTOUCH) 200 UNIT/ML SOPN    DISCONTINUED: Insulin Degludec (TRESIBA FLEXTOUCH) 200 UNIT/ML SOPN      2. Hypothyroidism, unspecified type        3. BMI 29.0-29.9,adult          No orders of the defined types were placed in this encounter. Orders Placed This Encounter   Medications    DISCONTD: Insulin Degludec (TRESIBA FLEXTOUCH) 200 UNIT/ML SOPN     Sig: Inject 40 Units into the skin daily     Dispense:  3 pen     Refill:  3    Insulin Degludec (TRESIBA FLEXTOUCH) 200 UNIT/ML SOPN     Sig: Inject 40 Units into the skin daily     Dispense:  3 pen     Refill:  3     Requested Prescriptions     Signed Prescriptions Disp Refills    Insulin Degludec (TRESIBA FLEXTOUCH) 200 UNIT/ML SOPN 3 pen 3     Sig: Inject 40 Units into the skin daily       1. Type 1 diabetes mellitus without complication (HCC)  - Unstable  HbA1C goal is less than 7%. - Fasting blood glucose goal is 70-120mg/dl and postprandial blood sugar goal is less than 180 mg/dl.    - Labs reviewed including most recent A1c, microalbumin and kidney function. Repeat labs due in 3 months.    -We discussed in great detail dietary modifications they can make to better improve their blood sugars. -follow up diabetes education completed, all questions answered. CGM report downloaded and reviewed, scanned to media tab. Time in range 34% and hypoglycemia 0%. Predicted A1c per CGM report 8.7%. -will increase basal and work on compliance and lower carb meals. Discussed signs and symptoms of hyper/hypoglycemia and how to treat. Encouraged 150 minutes of physical activity per week. Follow a low carbohydrate diet. Encouraged at least 7 hours of sleep. The patient was informed of the goals of diabetes management. This can only be accomplished by watching their diet and exercise levels. We certainly use medicines to help attain these goals. The consequences of not controlling blood sugars were discussed. These include blindness, heart disease, stroke, kidney disease, and possibly need for dialysis. They were told to be careful with their foot care as diabetics often have nerve damage, infections and risk for limb amutations . They also need a dilated eye exam yearly. We discussed the issues of diet, exercise, medication, complication avoidance, reviewed the signs and symptoms of diabetes, hypoglycemic episodes, significance of HbA1C.     - Insulin Degludec (TRESIBA FLEXTOUCH) 200 UNIT/ML SOPN; Inject 40 Units into the skin daily  Dispense: 3 pen; Refill: 3    2. Hypothyroidism, unspecified type  -TSH is elevated, T4 is pending. She is having cold intolerance, fatigue and dry skin. Will most likely increase levothyroxine. 3. BMI 29.0-29.9,adult  Reduce calories and increase physical activity to achieve a slow and steady weight loss to improve blood pressure, cholesterol and diabetes. Answered all patient questions. Agrees to follow plan of care and to follow up in 3 months, sooner if needed.  Call office if unexplained blood sugars less than 70 occur or above 400. Call office or access MyTime with any further questions or concerns. Be sure to bring glucometer/food log at next appointment. Total time spent reviewing chart, labs, counseling patient and documenting on the date of the encounter: 30 min.        Electronically signed by MARIA D Riggins CNP on 8/9/2022 at 2:47 PM      (Please note that portions of this note were completed with a voice-recognition program. Efforts were made to edit the dictation but occasionally words are mis-transcribed.)

## 2022-08-09 NOTE — Clinical Note
Dr. Lydia Jensen. Pt was previously seeing endo who managed her T1D and hypothyroidism. Labs are pending but wanted to confirm with you if you wanted to manage thyroid or would like me to since she is not longer following with endo. Thanks so much.

## 2022-08-10 DIAGNOSIS — E10.9 TYPE 1 DIABETES MELLITUS WITHOUT COMPLICATION (HCC): ICD-10-CM

## 2022-08-10 DIAGNOSIS — E03.9 HYPOTHYROIDISM, UNSPECIFIED TYPE: Primary | ICD-10-CM

## 2022-08-10 LAB
CREATININE URINE: 64.9 MG/DL (ref 28–217)
ESTIMATED AVERAGE GLUCOSE: 246 MG/DL
HBA1C MFR BLD: 10.2 % (ref 4–6)
MICROALBUMIN/CREAT 24H UR: 13 MG/L
MICROALBUMIN/CREAT UR-RTO: 20 MCG/MG CREAT

## 2022-08-10 RX ORDER — LEVOTHYROXINE SODIUM 112 UG/1
112 TABLET ORAL DAILY
Qty: 30 TABLET | Refills: 3 | Status: SHIPPED | OUTPATIENT
Start: 2022-08-10

## 2022-08-18 ENCOUNTER — PATIENT MESSAGE (OUTPATIENT)
Dept: DIABETES SERVICES | Age: 22
End: 2022-08-18

## 2022-08-18 DIAGNOSIS — E10.9 TYPE 1 DIABETES MELLITUS WITHOUT COMPLICATION (HCC): Primary | ICD-10-CM

## 2022-08-22 RX ORDER — BLOOD-GLUCOSE TRANSMITTER
EACH MISCELLANEOUS
Qty: 1 EACH | Refills: 3 | Status: SHIPPED | OUTPATIENT
Start: 2022-08-22

## 2022-08-22 RX ORDER — BLOOD-GLUCOSE SENSOR
EACH MISCELLANEOUS
Qty: 9 EACH | Refills: 3 | Status: SHIPPED | OUTPATIENT
Start: 2022-08-22

## 2022-08-23 NOTE — TELEPHONE ENCOUNTER
Scripts signed and faxed to [facility] along with Demographics sheet.     # [phone]  Fax# [phone]

## 2022-09-12 ENCOUNTER — OFFICE VISIT (OUTPATIENT)
Dept: FAMILY MEDICINE CLINIC | Age: 22
End: 2022-09-12
Payer: COMMERCIAL

## 2022-09-12 VITALS
TEMPERATURE: 97.6 F | HEART RATE: 96 BPM | HEIGHT: 65 IN | SYSTOLIC BLOOD PRESSURE: 100 MMHG | OXYGEN SATURATION: 98 % | BODY MASS INDEX: 29.99 KG/M2 | WEIGHT: 180 LBS | DIASTOLIC BLOOD PRESSURE: 76 MMHG | RESPIRATION RATE: 16 BRPM

## 2022-09-12 DIAGNOSIS — E03.9 HYPOTHYROIDISM, UNSPECIFIED TYPE: ICD-10-CM

## 2022-09-12 DIAGNOSIS — E10.9 TYPE 1 DIABETES MELLITUS WITHOUT COMPLICATION (HCC): ICD-10-CM

## 2022-09-12 DIAGNOSIS — Z00.00 ROUTINE GENERAL MEDICAL EXAMINATION AT A HEALTH CARE FACILITY: Primary | ICD-10-CM

## 2022-09-12 PROCEDURE — 99395 PREV VISIT EST AGE 18-39: CPT | Performed by: FAMILY MEDICINE

## 2022-09-12 PROCEDURE — 99213 OFFICE O/P EST LOW 20 MIN: CPT

## 2022-09-12 SDOH — ECONOMIC STABILITY: FOOD INSECURITY: WITHIN THE PAST 12 MONTHS, YOU WORRIED THAT YOUR FOOD WOULD RUN OUT BEFORE YOU GOT MONEY TO BUY MORE.: NEVER TRUE

## 2022-09-12 SDOH — ECONOMIC STABILITY: FOOD INSECURITY: WITHIN THE PAST 12 MONTHS, THE FOOD YOU BOUGHT JUST DIDN'T LAST AND YOU DIDN'T HAVE MONEY TO GET MORE.: NEVER TRUE

## 2022-09-12 ASSESSMENT — PATIENT HEALTH QUESTIONNAIRE - PHQ9
SUM OF ALL RESPONSES TO PHQ QUESTIONS 1-9: 0
1. LITTLE INTEREST OR PLEASURE IN DOING THINGS: 0
SUM OF ALL RESPONSES TO PHQ QUESTIONS 1-9: 0
SUM OF ALL RESPONSES TO PHQ9 QUESTIONS 1 & 2: 0
2. FEELING DOWN, DEPRESSED OR HOPELESS: 0
SUM OF ALL RESPONSES TO PHQ QUESTIONS 1-9: 0
SUM OF ALL RESPONSES TO PHQ QUESTIONS 1-9: 0

## 2022-09-12 ASSESSMENT — ENCOUNTER SYMPTOMS
SORE THROAT: 0
RHINORRHEA: 0
WHEEZING: 0
ABDOMINAL PAIN: 0
SINUS PRESSURE: 0
DIARRHEA: 0
NAUSEA: 0
VOMITING: 0
TROUBLE SWALLOWING: 0
EYE DISCHARGE: 0
SHORTNESS OF BREATH: 0
CONSTIPATION: 0
EYE REDNESS: 0
COUGH: 0

## 2022-09-12 ASSESSMENT — SOCIAL DETERMINANTS OF HEALTH (SDOH): HOW HARD IS IT FOR YOU TO PAY FOR THE VERY BASICS LIKE FOOD, HOUSING, MEDICAL CARE, AND HEATING?: NOT VERY HARD

## 2022-09-12 NOTE — PATIENT INSTRUCTIONS
Hospital Outpatient Visit on 08/09/2022   Component Date Value Ref Range Status    Hemoglobin A1C 08/09/2022 10.2 (A) 4.0 - 6.0 % Final    Estimated Avg Glucose 08/09/2022 246  mg/dL Final    Comment: The ADA and AACC recommend providing the estimated average glucose result to permit better   patient understanding of their HBA1c result. TSH 08/09/2022 13.89 (A) 0.30 - 5.00 uIU/mL Final    Microalb, Ur 08/09/2022 13  <21 mg/L Final    Creatinine, Ur 08/09/2022 64.9  28.0 - 217.0 mg/dL Final    Microalb/Crt.  Ratio 08/09/2022 20  <25 mcg/mg creat Final    Thyroxine, Free 08/09/2022 1.33  0.93 - 1.70 ng/dL Final

## 2022-09-12 NOTE — PROGRESS NOTES
history, pmhx and pshx today as noted in the record. Review of Systems   Constitutional:  Negative for chills, fatigue and fever. HENT:  Negative for congestion, ear pain, postnasal drip, rhinorrhea, sinus pressure, sore throat and trouble swallowing. Eyes:  Negative for discharge and redness. Respiratory:  Negative for cough, shortness of breath and wheezing. Cardiovascular:  Negative for chest pain. Gastrointestinal:  Negative for abdominal pain, constipation, diarrhea, nausea and vomiting. Genitourinary:  Negative for dysuria, flank pain, frequency and urgency. Musculoskeletal:  Negative for arthralgias, myalgias and neck pain. Skin:  Negative for rash and wound. Allergic/Immunologic: Negative for environmental allergies. Neurological:  Negative for dizziness, weakness, light-headedness and headaches. Hematological:  Negative for adenopathy. Psychiatric/Behavioral: Negative. Prior to Visit Medications    Medication Sig Taking? Authorizing Provider   Continuous Blood Gluc Transmit (DEXCOM G6 TRANSMITTER) MISC Use daily to check blood sugars. Change every 90 days. MARIA D Tirado CNP   Continuous Blood Gluc Sensor (DEXCOM G6 SENSOR) MISC Use daily to check blood sugars. Change every 10 days. MARIA D Tirado CNP   levothyroxine (SYNTHROID) 112 MCG tablet Take 1 tablet by mouth in the morning. MARIA D Tirado CNP   Insulin Degludec (TRESIBA FLEXTOUCH) 200 UNIT/ML SOPN Inject 40 Units into the skin daily  MARIA D Tirado CNP   insulin lispro, 1 Unit Dial, 100 UNIT/ML SOPN Take 3 times daily.  Max daily dose of  Historical Provider, MD   Lancets (BD LANCET ULTRAFINE 30G) MISC Use to test blood sugar 5 times daily  MARIA D Tirado CNP   Insulin Pen Needle (B-D ULTRAFINE III SHORT PEN) 31G X 8 MM MISC Use to inject insulin 6 times daily  MARIA D Tirado CNP   acetone, urine, test (KETOSTIX) strip Test daily as needed for ketones Nicole Patelkj Mtz, APRN - CNP   blood glucose test strips (TRUETEST TEST) strip Use to test blood sugars 5 times daily  Nicole Brady Bibi, APRN - CNP   vitamin D (CHOLECALCIFEROL) 25 MCG (1000 UT) TABS tablet Take 1,000 Units by mouth daily  Historical Provider, MD BOND ONE PACK 3 MG/DOSE POWD   Historical Provider, MD   norethindrone (JOLEEN) 0.35 MG tablet Take 1 tablet by mouth daily  [place] Right, DO   B-D INS SYR ULTRAFINE 1CC/31G 31G X 5/16\" 1 ML MISC   Historical Provider, MD   cetirizine (ZYRTEC) 10 MG tablet TAKE ONE TABLET BY MOUTH EVERY DAY  Pita Greenema   ULTRA-COMFORT INSULIN SYRINGE 31G X 5/16\" 0.5 ML MISC USE ONCE DAILY WITH ESAU Meyer MD   glucagon (GLUCAGON EMERGENCY) 1 MG injection As directed for extreme hypoglycemia  Loreta Meyer MD        Social History     Tobacco Use    Smoking status: Never     Passive exposure: Yes    Smokeless tobacco: Never    Tobacco comments:     Passive smoke exposure. Substance Use Topics    Alcohol use: No     Alcohol/week: 0.0 standard drinks        There were no vitals filed for this visit. Estimated body mass index is 29.39 kg/m² as calculated from the following:    Height as of 8/9/22: 5' 5.25\" (1.657 m). Weight as of 8/9/22: 178 lb (80.7 kg). Physical Exam  Vitals and nursing note reviewed. Constitutional:       General: She is not in acute distress. Appearance: Normal appearance. She is well-developed. She is not diaphoretic. HENT:      Head: Normocephalic and atraumatic. Right Ear: Tympanic membrane, ear canal and external ear normal.      Left Ear: Tympanic membrane, ear canal and external ear normal.      Nose: Nose normal.      Mouth/Throat:      Mouth: Mucous membranes are moist.      Pharynx: Oropharynx is clear. No oropharyngeal exudate. Eyes:      General:         Right eye: No discharge. Left eye: No discharge.       Conjunctiva/sclera: Conjunctivae normal.      Pupils: Pupils are equal, round, and reactive to light. Neck:      Thyroid: No thyromegaly. Cardiovascular:      Rate and Rhythm: Normal rate and regular rhythm. Heart sounds: Normal heart sounds. Pulmonary:      Effort: Pulmonary effort is normal.      Breath sounds: Normal breath sounds. No wheezing or rales. Abdominal:      General: Bowel sounds are normal. There is no distension. Palpations: Abdomen is soft. Tenderness: There is no abdominal tenderness. Musculoskeletal:      Cervical back: Normal range of motion and neck supple. Lymphadenopathy:      Cervical: No cervical adenopathy. Skin:     General: Skin is warm and dry. Findings: No rash. Neurological:      Mental Status: She is alert and oriented to person, place, and time. Psychiatric:         Behavior: Behavior normal.         Thought Content: Thought content normal.         Judgment: Judgment normal.         ASSESSMENT/PLAN:  Encounter Diagnoses   Name Primary? Routine general medical examination at a health care facility Yes    Type 1 diabetes mellitus without complication ([facility]     Hypothyroidism, unspecified type      No orders of the defined types were placed in this encounter. No orders of the defined types were placed in this encounter. At this point patient is to continue with her current medical regimen. No changes are made at this time. Patient is to continue to work with the diabetic educator for continued insulin adjustments to manage her type 1 diabetes. Patient is to continue to follow healthy dietary intake and maintain routine physical exercise for optimal health. Patient is to return to my office annually for routine general physical exam or sooner if any further problems or symptoms arise. (Please note that portions of this note were completed with a voice recognition program. Efforts were made to edit the dictations but occasionally words are mis-transcribed.)        No follow-ups on file.     An electronic signature was used to authenticate this note.     --Colin Schultz, DO on 9/12/2022 at 7:21 AM

## 2022-09-12 NOTE — PROGRESS NOTES
Patient declines flu and pneumonia vaccines. Declines hep c and chlamydia screening at this time. States she had her eye exam 10/4/2021.

## 2022-10-21 ENCOUNTER — OFFICE VISIT (OUTPATIENT)
Dept: PRIMARY CARE CLINIC | Age: 22
End: 2022-10-21
Payer: COMMERCIAL

## 2022-10-21 VITALS
HEIGHT: 65 IN | BODY MASS INDEX: 29.19 KG/M2 | WEIGHT: 175.2 LBS | DIASTOLIC BLOOD PRESSURE: 76 MMHG | TEMPERATURE: 98.1 F | SYSTOLIC BLOOD PRESSURE: 124 MMHG | OXYGEN SATURATION: 99 % | HEART RATE: 102 BPM

## 2022-10-21 DIAGNOSIS — J06.9 VIRAL URI WITH COUGH: Primary | ICD-10-CM

## 2022-10-21 DIAGNOSIS — R11.0 NAUSEA: ICD-10-CM

## 2022-10-21 PROCEDURE — 99212 OFFICE O/P EST SF 10 MIN: CPT | Performed by: NURSE PRACTITIONER

## 2022-10-21 PROCEDURE — G8419 CALC BMI OUT NRM PARAM NOF/U: HCPCS | Performed by: NURSE PRACTITIONER

## 2022-10-21 PROCEDURE — G8427 DOCREV CUR MEDS BY ELIG CLIN: HCPCS | Performed by: NURSE PRACTITIONER

## 2022-10-21 PROCEDURE — 99213 OFFICE O/P EST LOW 20 MIN: CPT | Performed by: NURSE PRACTITIONER

## 2022-10-21 PROCEDURE — G8484 FLU IMMUNIZE NO ADMIN: HCPCS | Performed by: NURSE PRACTITIONER

## 2022-10-21 PROCEDURE — 1036F TOBACCO NON-USER: CPT | Performed by: NURSE PRACTITIONER

## 2022-10-21 RX ORDER — DEXTROMETHORPHAN HYDROBROMIDE AND PROMETHAZINE HYDROCHLORIDE 15; 6.25 MG/5ML; MG/5ML
5 SYRUP ORAL 4 TIMES DAILY PRN
Qty: 240 ML | Refills: 0 | Status: SHIPPED | OUTPATIENT
Start: 2022-10-21 | End: 2022-10-28

## 2022-10-21 RX ORDER — ONDANSETRON 4 MG/1
4 TABLET, ORALLY DISINTEGRATING ORAL 3 TIMES DAILY PRN
Qty: 21 TABLET | Refills: 0 | Status: SHIPPED | OUTPATIENT
Start: 2022-10-21

## 2022-10-21 RX ORDER — BENZONATATE 100 MG/1
100 CAPSULE ORAL 3 TIMES DAILY PRN
Qty: 30 CAPSULE | Refills: 0 | Status: SHIPPED | OUTPATIENT
Start: 2022-10-21 | End: 2022-10-28

## 2022-10-21 ASSESSMENT — ENCOUNTER SYMPTOMS
COUGH: 1
RHINORRHEA: 1

## 2022-10-21 NOTE — PROGRESS NOTES
Subjective:      Patient ID: Bianka Sutton is a 22 y.o. female coming in for   Chief Complaint   Patient presents with    Cough     Nausea,BS have been high side,pt does have celiac and thyroid issues. URI   This is a new problem. The current episode started in the past 7 days. There has been no fever. Associated symptoms include congestion, coughing and rhinorrhea. Glucose running high between 200-300's. Review of Systems   HENT:  Positive for congestion and rhinorrhea. Respiratory:  Positive for cough. Objective:/76   Pulse (!) 102   Temp 98.1 °F (36.7 °C) (Temporal)   Ht 5' 5.2\" (1.656 m)   Wt 175 lb 3.2 oz (79.5 kg)   SpO2 99%   BMI 28.98 kg/m²      Physical Exam  Vitals and nursing note reviewed. Constitutional:       General: She is not in acute distress. Appearance: Normal appearance. She is not ill-appearing. HENT:      Head: Normocephalic. Right Ear: Tympanic membrane normal.      Left Ear: Tympanic membrane normal.      Nose: Congestion and rhinorrhea present. Mouth/Throat:      Mouth: Mucous membranes are moist.      Pharynx: No oropharyngeal exudate or posterior oropharyngeal erythema. Cardiovascular:      Rate and Rhythm: Normal rate and regular rhythm. Heart sounds: Normal heart sounds. Pulmonary:      Effort: Pulmonary effort is normal. No respiratory distress. Breath sounds: Normal breath sounds. No stridor. No wheezing, rhonchi or rales. Musculoskeletal:      Cervical back: Neck supple. Right lower leg: No edema. Left lower leg: No edema. Lymphadenopathy:      Cervical: No cervical adenopathy. Skin:     General: Skin is warm and dry. Findings: No rash. Neurological:      General: No focal deficit present. Mental Status: She is alert and oriented to person, place, and time. Assessment:      1.  Viral URI with cough           Plan:    -symptoms and exam consistent with viral uri  -discuss covering her high glucose levels while she is sick  -meds as prescribed  -push fluids    No orders of the defined types were placed in this encounter. Outpatient Encounter Medications as of 10/21/2022   Medication Sig Dispense Refill    promethazine-dextromethorphan (PROMETHAZINE-DM) 6.25-15 MG/5ML syrup Take 5 mLs by mouth 4 times daily as needed for Cough 240 mL 0    benzonatate (TESSALON) 100 MG capsule Take 1 capsule by mouth 3 times daily as needed for Cough 30 capsule 0    levothyroxine (SYNTHROID) 112 MCG tablet Take 1 tablet by mouth in the morning. 30 tablet 3    Insulin Degludec (TRESIBA FLEXTOUCH) 200 UNIT/ML SOPN Inject 40 Units into the skin daily 3 pen 3    insulin lispro, 1 Unit Dial, 100 UNIT/ML SOPN Take 3 times daily. Max daily dose of      acetone, urine, test (KETOSTIX) strip Test daily as needed for ketones 50 strip 2    vitamin D (CHOLECALCIFEROL) 25 MCG (1000 UT) TABS tablet Take 1,000 Units by mouth daily      BAQSIMI ONE PACK 3 MG/DOSE POWD       norethindrone (JOLEEN) 0.35 MG tablet Take 1 tablet by mouth daily 28 tablet 11    cetirizine (ZYRTEC) 10 MG tablet TAKE ONE TABLET BY MOUTH EVERY DAY 30 tablet 6    glucagon (GLUCAGON EMERGENCY) 1 MG injection As directed for extreme hypoglycemia 1 kit 2    Continuous Blood Gluc Transmit (DEXCOM G6 TRANSMITTER) MISC Use daily to check blood sugars. Change every 90 days. 1 each 3    Continuous Blood Gluc Sensor (DEXCOM G6 SENSOR) MISC Use daily to check blood sugars. Change every 10 days.  9 each 3    Lancets (BD LANCET ULTRAFINE 30G) MISC Use to test blood sugar 5 times daily 200 each 11    Insulin Pen Needle (B-D ULTRAFINE III SHORT PEN) 31G X 8 MM MISC Use to inject insulin 6 times daily 200 each 6    blood glucose test strips (TRUETEST TEST) strip Use to test blood sugars 5 times daily 200 each 0    B-D INS SYR ULTRAFINE 1CC/31G 31G X 5/16\" 1 ML MISC       ULTRA-COMFORT INSULIN SYRINGE 31G X 5/16\" 0.5 ML MISC USE ONCE DAILY WITH LANTUS 30 each 3     No facility-administered encounter medications on file as of 10/21/2022.             Aida Galvan, APRN - CNP

## 2022-10-23 ENCOUNTER — APPOINTMENT (OUTPATIENT)
Dept: GENERAL RADIOLOGY | Age: 22
DRG: 420 | End: 2022-10-23
Payer: COMMERCIAL

## 2022-10-23 ENCOUNTER — HOSPITAL ENCOUNTER (INPATIENT)
Age: 22
LOS: 2 days | Discharge: HOME | DRG: 420 | End: 2022-10-26
Attending: EMERGENCY MEDICINE | Admitting: INTERNAL MEDICINE
Payer: COMMERCIAL

## 2022-10-23 DIAGNOSIS — E10.10 DIABETIC KETOACIDOSIS WITHOUT COMA ASSOCIATED WITH TYPE 1 DIABETES MELLITUS (HCC): Primary | ICD-10-CM

## 2022-10-23 DIAGNOSIS — E10.9 TYPE 1 DIABETES MELLITUS WITHOUT COMPLICATION (HCC): ICD-10-CM

## 2022-10-23 LAB
ABSOLUTE EOS #: 0 K/UL (ref 0–0.4)
ABSOLUTE IMMATURE GRANULOCYTE: 0.2 K/UL (ref 0–0.3)
ABSOLUTE LYMPH #: 0.59 K/UL (ref 1–4.8)
ABSOLUTE MONO #: 2.38 K/UL (ref 0.1–1.4)
BASOPHILS # BLD: 0 % (ref 0–1)
BASOPHILS ABSOLUTE: 0 K/UL (ref 0–0.2)
EOSINOPHILS RELATIVE PERCENT: 0 % (ref 1–7)
GLUCOSE BLD-MCNC: >600 MG/DL (ref 65–105)
GLUCOSE BLD-MCNC: >600 MG/DL (ref 65–105)
HCT VFR BLD CALC: 53.3 % (ref 36.3–47.1)
HEMOGLOBIN: 17.4 G/DL (ref 11.9–15.1)
IMMATURE GRANULOCYTES: 1 %
LYMPHOCYTES # BLD: 3 % (ref 16–46)
MCH RBC QN AUTO: 27.4 PG (ref 25.2–33.5)
MCHC RBC AUTO-ENTMCNC: 32.6 G/DL (ref 25.2–33.5)
MCV RBC AUTO: 83.9 FL (ref 82.6–102.9)
MONOCYTES # BLD: 12 % (ref 4–11)
MORPHOLOGY: ABNORMAL
MORPHOLOGY: ABNORMAL
NRBC AUTOMATED: 0 PER 100 WBC
PDW BLD-RTO: 13.5 % (ref 11.8–14.4)
PLATELET # BLD: 796 K/UL (ref 138–453)
PMV BLD AUTO: 10.3 FL (ref 8.1–13.5)
RBC # BLD: 6.35 M/UL (ref 3.95–5.11)
REASON FOR REJECTION: NORMAL
SEG NEUTROPHILS: 84 % (ref 43–77)
SEGMENTED NEUTROPHILS ABSOLUTE COUNT: 16.63 K/UL (ref 1.5–8.1)
WBC # BLD: 19.8 K/UL (ref 4.5–13.5)
ZZ NTE CLEAN UP: ORDERED TEST: NORMAL
ZZ NTE WITH NAME CLEAN UP: SPECIMEN SOURCE: NORMAL

## 2022-10-23 PROCEDURE — 96375 TX/PRO/DX INJ NEW DRUG ADDON: CPT

## 2022-10-23 PROCEDURE — 36415 COLL VENOUS BLD VENIPUNCTURE: CPT

## 2022-10-23 PROCEDURE — C9113 INJ PANTOPRAZOLE SODIUM, VIA: HCPCS

## 2022-10-23 PROCEDURE — 6370000000 HC RX 637 (ALT 250 FOR IP): Performed by: EMERGENCY MEDICINE

## 2022-10-23 PROCEDURE — 6360000002 HC RX W HCPCS

## 2022-10-23 PROCEDURE — 6360000002 HC RX W HCPCS: Performed by: EMERGENCY MEDICINE

## 2022-10-23 PROCEDURE — 2580000003 HC RX 258: Performed by: EMERGENCY MEDICINE

## 2022-10-23 PROCEDURE — 96372 THER/PROPH/DIAG INJ SC/IM: CPT

## 2022-10-23 PROCEDURE — 96361 HYDRATE IV INFUSION ADD-ON: CPT

## 2022-10-23 PROCEDURE — 71045 X-RAY EXAM CHEST 1 VIEW: CPT

## 2022-10-23 PROCEDURE — 85025 COMPLETE CBC W/AUTO DIFF WBC: CPT

## 2022-10-23 PROCEDURE — 84443 ASSAY THYROID STIM HORMONE: CPT

## 2022-10-23 PROCEDURE — 82010 KETONE BODYS QUAN: CPT

## 2022-10-23 PROCEDURE — 80048 BASIC METABOLIC PNL TOTAL CA: CPT

## 2022-10-23 PROCEDURE — 82947 ASSAY GLUCOSE BLOOD QUANT: CPT

## 2022-10-23 PROCEDURE — 99285 EMERGENCY DEPT VISIT HI MDM: CPT

## 2022-10-23 RX ORDER — ONDANSETRON 2 MG/ML
4 INJECTION INTRAMUSCULAR; INTRAVENOUS ONCE
Status: COMPLETED | OUTPATIENT
Start: 2022-10-23 | End: 2022-10-23

## 2022-10-23 RX ORDER — PANTOPRAZOLE SODIUM 40 MG/10ML
INJECTION, POWDER, LYOPHILIZED, FOR SOLUTION INTRAVENOUS
Status: COMPLETED
Start: 2022-10-23 | End: 2022-10-23

## 2022-10-23 RX ORDER — 0.9 % SODIUM CHLORIDE 0.9 %
1000 INTRAVENOUS SOLUTION INTRAVENOUS ONCE
Status: COMPLETED | OUTPATIENT
Start: 2022-10-23 | End: 2022-10-24

## 2022-10-23 RX ORDER — ONDANSETRON 2 MG/ML
4 INJECTION INTRAMUSCULAR; INTRAVENOUS ONCE
Status: COMPLETED | OUTPATIENT
Start: 2022-10-24 | End: 2022-10-23

## 2022-10-23 RX ADMIN — PANTOPRAZOLE SODIUM 40 MG: 40 INJECTION, POWDER, FOR SOLUTION INTRAVENOUS at 23:15

## 2022-10-23 RX ADMIN — INSULIN HUMAN 20 UNITS: 100 INJECTION, SOLUTION PARENTERAL at 22:55

## 2022-10-23 RX ADMIN — ONDANSETRON 4 MG: 2 INJECTION INTRAMUSCULAR; INTRAVENOUS at 23:59

## 2022-10-23 RX ADMIN — SODIUM CHLORIDE 1000 ML: 9 INJECTION, SOLUTION INTRAVENOUS at 22:45

## 2022-10-23 RX ADMIN — ONDANSETRON 4 MG: 2 INJECTION INTRAMUSCULAR; INTRAVENOUS at 22:43

## 2022-10-23 ASSESSMENT — ENCOUNTER SYMPTOMS
ALLERGIC/IMMUNOLOGIC NEGATIVE: 1
VOMITING: 1
NAUSEA: 1
RESPIRATORY NEGATIVE: 1
EYES NEGATIVE: 1

## 2022-10-24 PROBLEM — E10.10 DKA, TYPE 1, NOT AT GOAL (HCC): Status: ACTIVE | Noted: 2022-10-24

## 2022-10-24 PROBLEM — E10.69 TYPE 1 DIABETES MELLITUS WITH OTHER SPECIFIED COMPLICATION (HCC): Status: ACTIVE | Noted: 2022-10-24

## 2022-10-24 PROBLEM — N17.9 ACUTE KIDNEY INJURY SUPERIMPOSED ON CKD (HCC): Status: ACTIVE | Noted: 2022-10-24

## 2022-10-24 PROBLEM — E55.9 VITAMIN D DEFICIENCY: Status: ACTIVE | Noted: 2021-08-26

## 2022-10-24 PROBLEM — Z14.1 CYSTIC FIBROSIS CARRIER: Status: ACTIVE | Noted: 2021-08-25

## 2022-10-24 PROBLEM — N18.9 ACUTE KIDNEY INJURY SUPERIMPOSED ON CKD (HCC): Status: ACTIVE | Noted: 2022-10-24

## 2022-10-24 LAB
ABSOLUTE EOS #: 0 K/UL (ref 0–0.44)
ABSOLUTE IMMATURE GRANULOCYTE: 0.14 K/UL (ref 0–0.3)
ABSOLUTE LYMPH #: 1.5 K/UL (ref 1.1–3.7)
ABSOLUTE MONO #: 1.63 K/UL (ref 0.1–1.4)
ANION GAP SERPL CALCULATED.3IONS-SCNC: 13 MMOL/L (ref 9–17)
ANION GAP SERPL CALCULATED.3IONS-SCNC: 14 MMOL/L (ref 9–17)
ANION GAP SERPL CALCULATED.3IONS-SCNC: 15 MMOL/L (ref 9–17)
ANION GAP SERPL CALCULATED.3IONS-SCNC: 20 MMOL/L (ref 9–17)
ANION GAP SERPL CALCULATED.3IONS-SCNC: ABNORMAL MMOL/L (ref 9–17)
BACTERIA: ABNORMAL
BASOPHILS # BLD: 0 % (ref 0–2)
BASOPHILS ABSOLUTE: 0 K/UL (ref 0–0.2)
BETA-HYDROXYBUTYRATE: 8.49 MMOL/L (ref 0.02–0.27)
BILIRUBIN URINE: ABNORMAL
BUN BLDV-MCNC: 15 MG/DL (ref 6–20)
BUN BLDV-MCNC: 19 MG/DL (ref 6–20)
BUN BLDV-MCNC: 22 MG/DL (ref 6–20)
BUN BLDV-MCNC: 25 MG/DL (ref 6–20)
BUN BLDV-MCNC: 32 MG/DL (ref 6–20)
BUN/CREAT BLD: 20 (ref 9–20)
BUN/CREAT BLD: 21 (ref 9–20)
BUN/CREAT BLD: 23 (ref 9–20)
BUN/CREAT BLD: 25 (ref 9–20)
BUN/CREAT BLD: 25 (ref 9–20)
CALCIUM SERPL-MCNC: 9.3 MG/DL (ref 8.6–10.4)
CALCIUM SERPL-MCNC: 9.4 MG/DL (ref 8.6–10.4)
CALCIUM SERPL-MCNC: 9.5 MG/DL (ref 8.6–10.4)
CHLORIDE BLD-SCNC: 108 MMOL/L (ref 98–107)
CHLORIDE BLD-SCNC: 110 MMOL/L (ref 98–107)
CHLORIDE BLD-SCNC: 111 MMOL/L (ref 98–107)
CHLORIDE BLD-SCNC: 112 MMOL/L (ref 98–107)
CHLORIDE BLD-SCNC: 96 MMOL/L (ref 98–107)
CO2: 10 MMOL/L (ref 20–31)
CO2: 13 MMOL/L (ref 20–31)
CO2: 14 MMOL/L (ref 20–31)
CO2: 14 MMOL/L (ref 20–31)
CO2: <6 MMOL/L (ref 20–31)
CREAT SERPL-MCNC: 0.74 MG/DL (ref 0.5–0.9)
CREAT SERPL-MCNC: 0.82 MG/DL (ref 0.5–0.9)
CREAT SERPL-MCNC: 0.89 MG/DL (ref 0.5–0.9)
CREAT SERPL-MCNC: 1.02 MG/DL (ref 0.5–0.9)
CREAT SERPL-MCNC: 1.51 MG/DL (ref 0.5–0.9)
EOSINOPHILS RELATIVE PERCENT: 0 % (ref 1–4)
EPITHELIAL CELLS UA: ABNORMAL /HPF (ref 0–5)
ESTIMATED AVERAGE GLUCOSE: 212 MG/DL
GFR SERPL CREATININE-BSD FRML MDRD: 50 ML/MIN/1.73M2
GFR SERPL CREATININE-BSD FRML MDRD: >60 ML/MIN/1.73M2
GLUCOSE BLD-MCNC: 101 MG/DL (ref 65–105)
GLUCOSE BLD-MCNC: 110 MG/DL (ref 65–105)
GLUCOSE BLD-MCNC: 110 MG/DL (ref 65–105)
GLUCOSE BLD-MCNC: 113 MG/DL (ref 65–105)
GLUCOSE BLD-MCNC: 117 MG/DL (ref 65–105)
GLUCOSE BLD-MCNC: 117 MG/DL (ref 65–105)
GLUCOSE BLD-MCNC: 147 MG/DL (ref 65–105)
GLUCOSE BLD-MCNC: 154 MG/DL (ref 65–105)
GLUCOSE BLD-MCNC: 159 MG/DL (ref 70–99)
GLUCOSE BLD-MCNC: 162 MG/DL (ref 65–105)
GLUCOSE BLD-MCNC: 165 MG/DL (ref 65–105)
GLUCOSE BLD-MCNC: 166 MG/DL (ref 65–105)
GLUCOSE BLD-MCNC: 171 MG/DL (ref 70–99)
GLUCOSE BLD-MCNC: 188 MG/DL (ref 65–105)
GLUCOSE BLD-MCNC: 194 MG/DL (ref 65–105)
GLUCOSE BLD-MCNC: 197 MG/DL (ref 65–105)
GLUCOSE BLD-MCNC: 202 MG/DL (ref 65–105)
GLUCOSE BLD-MCNC: 203 MG/DL (ref 70–99)
GLUCOSE BLD-MCNC: 212 MG/DL (ref 70–99)
GLUCOSE BLD-MCNC: 241 MG/DL (ref 65–105)
GLUCOSE BLD-MCNC: 260 MG/DL (ref 65–105)
GLUCOSE BLD-MCNC: 260 MG/DL (ref 65–105)
GLUCOSE BLD-MCNC: 275 MG/DL (ref 65–105)
GLUCOSE BLD-MCNC: 324 MG/DL (ref 65–105)
GLUCOSE BLD-MCNC: 406 MG/DL (ref 65–105)
GLUCOSE BLD-MCNC: 413 MG/DL (ref 65–105)
GLUCOSE BLD-MCNC: 422 MG/DL (ref 65–105)
GLUCOSE BLD-MCNC: 523 MG/DL (ref 65–105)
GLUCOSE BLD-MCNC: 667 MG/DL (ref 70–99)
GLUCOSE URINE: ABNORMAL
HBA1C MFR BLD: 9 % (ref 4–6)
HCG(URINE) PREGNANCY TEST: NEGATIVE
HCT VFR BLD CALC: 44.2 % (ref 36.3–47.1)
HEMOGLOBIN: 14.9 G/DL (ref 11.9–15.1)
IMMATURE GRANULOCYTES: 1 %
KETONES, URINE: ABNORMAL
LEUKOCYTE ESTERASE, URINE: NEGATIVE
LYMPHOCYTES # BLD: 11 % (ref 25–45)
MAGNESIUM: 1.9 MG/DL (ref 1.6–2.6)
MAGNESIUM: 1.9 MG/DL (ref 1.6–2.6)
MAGNESIUM: 2 MG/DL (ref 1.6–2.6)
MAGNESIUM: 2 MG/DL (ref 1.6–2.6)
MAGNESIUM: 2.3 MG/DL (ref 1.6–2.6)
MCH RBC QN AUTO: 27.1 PG (ref 25.2–33.5)
MCHC RBC AUTO-ENTMCNC: 33.7 G/DL (ref 25.2–33.5)
MCV RBC AUTO: 80.4 FL (ref 82.6–102.9)
MONOCYTES # BLD: 12 % (ref 2–8)
MORPHOLOGY: ABNORMAL
NITRITE, URINE: NEGATIVE
NRBC AUTOMATED: 0 PER 100 WBC
PDW BLD-RTO: 13.4 % (ref 11.8–14.4)
PH UA: 6 (ref 5–6)
PHOSPHORUS: 1.1 MG/DL (ref 2.6–4.5)
PHOSPHORUS: 1.3 MG/DL (ref 2.6–4.5)
PHOSPHORUS: 1.4 MG/DL (ref 2.6–4.5)
PHOSPHORUS: 1.6 MG/DL (ref 2.6–4.5)
PLATELET # BLD: 556 K/UL (ref 138–453)
PMV BLD AUTO: 9.4 FL (ref 8.1–13.5)
POTASSIUM SERPL-SCNC: 3.1 MMOL/L (ref 3.7–5.3)
POTASSIUM SERPL-SCNC: 3.7 MMOL/L (ref 3.7–5.3)
POTASSIUM SERPL-SCNC: 3.8 MMOL/L (ref 3.7–5.3)
POTASSIUM SERPL-SCNC: 3.8 MMOL/L (ref 3.7–5.3)
POTASSIUM SERPL-SCNC: 4.6 MMOL/L (ref 3.7–5.3)
PROTEIN UA: ABNORMAL
RBC # BLD: 5.5 M/UL (ref 3.95–5.11)
RBC UA: ABNORMAL /HPF (ref 0–4)
REASON FOR REJECTION: NORMAL
SARS-COV-2, RAPID: NOT DETECTED
SEG NEUTROPHILS: 76 % (ref 34–64)
SEGMENTED NEUTROPHILS ABSOLUTE COUNT: 10.33 K/UL (ref 1.5–8.1)
SODIUM BLD-SCNC: 135 MMOL/L (ref 135–144)
SODIUM BLD-SCNC: 136 MMOL/L (ref 135–144)
SODIUM BLD-SCNC: 137 MMOL/L (ref 135–144)
SODIUM BLD-SCNC: 140 MMOL/L (ref 135–144)
SODIUM BLD-SCNC: 141 MMOL/L (ref 135–144)
SPECIFIC GRAVITY UA: 1.03 (ref 1.01–1.02)
SPECIMEN DESCRIPTION: NORMAL
TSH SERPL DL<=0.05 MIU/L-ACNC: 0.43 UIU/ML (ref 0.3–5)
URINE HGB: ABNORMAL
UROBILINOGEN, URINE: NORMAL
WBC # BLD: 13.6 K/UL (ref 4.5–13.5)
WBC UA: ABNORMAL /HPF (ref 0–4)
ZZ NTE CLEAN UP: ORDERED TEST: NORMAL
ZZ NTE WITH NAME CLEAN UP: SPECIMEN SOURCE: NORMAL

## 2022-10-24 PROCEDURE — 83036 HEMOGLOBIN GLYCOSYLATED A1C: CPT

## 2022-10-24 PROCEDURE — 2500000003 HC RX 250 WO HCPCS

## 2022-10-24 PROCEDURE — 80048 BASIC METABOLIC PNL TOTAL CA: CPT

## 2022-10-24 PROCEDURE — 85025 COMPLETE CBC W/AUTO DIFF WBC: CPT

## 2022-10-24 PROCEDURE — APPSS30 APP SPLIT SHARED TIME 16-30 MINUTES

## 2022-10-24 PROCEDURE — 36415 COLL VENOUS BLD VENIPUNCTURE: CPT

## 2022-10-24 PROCEDURE — 94760 N-INVAS EAR/PLS OXIMETRY 1: CPT

## 2022-10-24 PROCEDURE — 6360000002 HC RX W HCPCS

## 2022-10-24 PROCEDURE — 6370000000 HC RX 637 (ALT 250 FOR IP)

## 2022-10-24 PROCEDURE — 2500000003 HC RX 250 WO HCPCS: Performed by: INTERNAL MEDICINE

## 2022-10-24 PROCEDURE — 2580000003 HC RX 258: Performed by: EMERGENCY MEDICINE

## 2022-10-24 PROCEDURE — 2000000000 HC ICU R&B

## 2022-10-24 PROCEDURE — C9113 INJ PANTOPRAZOLE SODIUM, VIA: HCPCS

## 2022-10-24 PROCEDURE — 84100 ASSAY OF PHOSPHORUS: CPT

## 2022-10-24 PROCEDURE — 81025 URINE PREGNANCY TEST: CPT

## 2022-10-24 PROCEDURE — 82947 ASSAY GLUCOSE BLOOD QUANT: CPT

## 2022-10-24 PROCEDURE — 87635 SARS-COV-2 COVID-19 AMP PRB: CPT

## 2022-10-24 PROCEDURE — 2580000003 HC RX 258

## 2022-10-24 PROCEDURE — 99222 1ST HOSP IP/OBS MODERATE 55: CPT | Performed by: INTERNAL MEDICINE

## 2022-10-24 PROCEDURE — 96365 THER/PROPH/DIAG IV INF INIT: CPT

## 2022-10-24 PROCEDURE — 83735 ASSAY OF MAGNESIUM: CPT

## 2022-10-24 PROCEDURE — 81001 URINALYSIS AUTO W/SCOPE: CPT

## 2022-10-24 RX ORDER — DEXTROSE MONOHYDRATE 100 MG/ML
INJECTION, SOLUTION INTRAVENOUS CONTINUOUS PRN
Status: DISCONTINUED | OUTPATIENT
Start: 2022-10-24 | End: 2022-10-26 | Stop reason: HOSPADM

## 2022-10-24 RX ORDER — TRAMADOL HYDROCHLORIDE 50 MG/1
100 TABLET ORAL EVERY 6 HOURS PRN
Status: DISCONTINUED | OUTPATIENT
Start: 2022-10-24 | End: 2022-10-26 | Stop reason: HOSPADM

## 2022-10-24 RX ORDER — POLYETHYLENE GLYCOL 3350 17 G/17G
17 POWDER, FOR SOLUTION ORAL DAILY PRN
Status: DISCONTINUED | OUTPATIENT
Start: 2022-10-24 | End: 2022-10-26 | Stop reason: HOSPADM

## 2022-10-24 RX ORDER — MORPHINE SULFATE 2 MG/ML
2 INJECTION, SOLUTION INTRAMUSCULAR; INTRAVENOUS
Status: DISCONTINUED | OUTPATIENT
Start: 2022-10-24 | End: 2022-10-26 | Stop reason: HOSPADM

## 2022-10-24 RX ORDER — MORPHINE SULFATE 2 MG/ML
1 INJECTION, SOLUTION INTRAMUSCULAR; INTRAVENOUS
Status: DISCONTINUED | OUTPATIENT
Start: 2022-10-24 | End: 2022-10-26 | Stop reason: HOSPADM

## 2022-10-24 RX ORDER — CETIRIZINE HYDROCHLORIDE 5 MG/1
10 TABLET ORAL DAILY
Status: DISCONTINUED | OUTPATIENT
Start: 2022-10-24 | End: 2022-10-26 | Stop reason: HOSPADM

## 2022-10-24 RX ORDER — DEXTROSE AND SODIUM CHLORIDE 5; .45 G/100ML; G/100ML
INJECTION, SOLUTION INTRAVENOUS CONTINUOUS PRN
Status: DISCONTINUED | OUTPATIENT
Start: 2022-10-24 | End: 2022-10-26 | Stop reason: HOSPADM

## 2022-10-24 RX ORDER — POTASSIUM CHLORIDE 20 MEQ/1
TABLET, EXTENDED RELEASE ORAL
Status: COMPLETED
Start: 2022-10-24 | End: 2022-10-24

## 2022-10-24 RX ORDER — TRAMADOL HYDROCHLORIDE 50 MG/1
50 TABLET ORAL EVERY 6 HOURS PRN
Status: DISCONTINUED | OUTPATIENT
Start: 2022-10-24 | End: 2022-10-26 | Stop reason: HOSPADM

## 2022-10-24 RX ORDER — 0.9 % SODIUM CHLORIDE 0.9 %
1000 INTRAVENOUS SOLUTION INTRAVENOUS ONCE
Status: COMPLETED | OUTPATIENT
Start: 2022-10-24 | End: 2022-10-24

## 2022-10-24 RX ORDER — LEVOTHYROXINE SODIUM 112 UG/1
112 TABLET ORAL DAILY
Status: DISCONTINUED | OUTPATIENT
Start: 2022-10-24 | End: 2022-10-26 | Stop reason: HOSPADM

## 2022-10-24 RX ORDER — PROCHLORPERAZINE EDISYLATE 5 MG/ML
10 INJECTION INTRAMUSCULAR; INTRAVENOUS EVERY 6 HOURS PRN
Status: DISCONTINUED | OUTPATIENT
Start: 2022-10-24 | End: 2022-10-26 | Stop reason: HOSPADM

## 2022-10-24 RX ORDER — POTASSIUM CHLORIDE 7.45 MG/ML
10 INJECTION INTRAVENOUS PRN
Status: DISCONTINUED | OUTPATIENT
Start: 2022-10-24 | End: 2022-10-25

## 2022-10-24 RX ORDER — MAGNESIUM SULFATE 1 G/100ML
1000 INJECTION INTRAVENOUS PRN
Status: DISCONTINUED | OUTPATIENT
Start: 2022-10-24 | End: 2022-10-26 | Stop reason: HOSPADM

## 2022-10-24 RX ORDER — ACETAMINOPHEN AND CODEINE PHOSPHATE 120; 12 MG/5ML; MG/5ML
1 SOLUTION ORAL DAILY
Status: DISCONTINUED | OUTPATIENT
Start: 2022-10-24 | End: 2022-10-26 | Stop reason: HOSPADM

## 2022-10-24 RX ORDER — ENOXAPARIN SODIUM 100 MG/ML
40 INJECTION SUBCUTANEOUS DAILY
Status: DISCONTINUED | OUTPATIENT
Start: 2022-10-24 | End: 2022-10-26 | Stop reason: HOSPADM

## 2022-10-24 RX ORDER — VITAMIN B COMPLEX
1000 TABLET ORAL DAILY
Status: DISCONTINUED | OUTPATIENT
Start: 2022-10-24 | End: 2022-10-26 | Stop reason: HOSPADM

## 2022-10-24 RX ORDER — BENZONATATE 100 MG/1
100 CAPSULE ORAL 3 TIMES DAILY PRN
Status: DISCONTINUED | OUTPATIENT
Start: 2022-10-24 | End: 2022-10-26 | Stop reason: HOSPADM

## 2022-10-24 RX ORDER — POTASSIUM CHLORIDE 20 MEQ/1
60 TABLET, EXTENDED RELEASE ORAL ONCE
Status: COMPLETED | OUTPATIENT
Start: 2022-10-24 | End: 2022-10-24

## 2022-10-24 RX ORDER — CLINDAMYCIN PHOSPHATE 600 MG/50ML
600 INJECTION INTRAVENOUS EVERY 8 HOURS
Status: DISCONTINUED | OUTPATIENT
Start: 2022-10-24 | End: 2022-10-25

## 2022-10-24 RX ORDER — SODIUM CHLORIDE 450 MG/100ML
INJECTION, SOLUTION INTRAVENOUS CONTINUOUS
Status: DISCONTINUED | OUTPATIENT
Start: 2022-10-24 | End: 2022-10-26 | Stop reason: HOSPADM

## 2022-10-24 RX ORDER — DEXTROMETHORPHAN HYDROBROMIDE AND PROMETHAZINE HYDROCHLORIDE 15; 6.25 MG/5ML; MG/5ML
5 SYRUP ORAL 4 TIMES DAILY PRN
Status: DISCONTINUED | OUTPATIENT
Start: 2022-10-24 | End: 2022-10-26 | Stop reason: HOSPADM

## 2022-10-24 RX ADMIN — MORPHINE SULFATE 1 MG: 2 INJECTION, SOLUTION INTRAMUSCULAR; INTRAVENOUS at 03:06

## 2022-10-24 RX ADMIN — POTASSIUM CHLORIDE 60 MEQ: 1500 TABLET, EXTENDED RELEASE ORAL at 17:10

## 2022-10-24 RX ADMIN — SODIUM CHLORIDE: 4.5 INJECTION, SOLUTION INTRAVENOUS at 06:20

## 2022-10-24 RX ADMIN — DEXTROSE AND SODIUM CHLORIDE: 5; 450 INJECTION, SOLUTION INTRAVENOUS at 10:13

## 2022-10-24 RX ADMIN — ENOXAPARIN SODIUM 40 MG: 100 INJECTION SUBCUTANEOUS at 08:45

## 2022-10-24 RX ADMIN — MORPHINE SULFATE 1 MG: 2 INJECTION, SOLUTION INTRAMUSCULAR; INTRAVENOUS at 21:22

## 2022-10-24 RX ADMIN — POTASSIUM CHLORIDE 60 MEQ: 20 TABLET, EXTENDED RELEASE ORAL at 17:10

## 2022-10-24 RX ADMIN — PROCHLORPERAZINE EDISYLATE 10 MG: 5 INJECTION INTRAMUSCULAR; INTRAVENOUS at 21:22

## 2022-10-24 RX ADMIN — SODIUM CHLORIDE: 4.5 INJECTION, SOLUTION INTRAVENOUS at 02:28

## 2022-10-24 RX ADMIN — SODIUM PHOSPHATE, MONOBASIC, MONOHYDRATE 20 MMOL: 276; 142 INJECTION, SOLUTION INTRAVENOUS at 12:52

## 2022-10-24 RX ADMIN — Medication 8.4 UNITS/HR: at 19:59

## 2022-10-24 RX ADMIN — CLINDAMYCIN PHOSPHATE 600 MG: 600 INJECTION, SOLUTION INTRAVENOUS at 08:51

## 2022-10-24 RX ADMIN — CLINDAMYCIN PHOSPHATE 600 MG: 600 INJECTION, SOLUTION INTRAVENOUS at 16:43

## 2022-10-24 RX ADMIN — CHOLECALCIFEROL TAB 25 MCG (1000 UNIT) 1000 UNITS: 25 TAB at 08:45

## 2022-10-24 RX ADMIN — Medication 5.1 UNITS/HR: at 10:04

## 2022-10-24 RX ADMIN — SODIUM CHLORIDE, PRESERVATIVE FREE 40 MG: 5 INJECTION INTRAVENOUS at 08:45

## 2022-10-24 RX ADMIN — SODIUM CHLORIDE: 4.5 INJECTION, SOLUTION INTRAVENOUS at 23:56

## 2022-10-24 RX ADMIN — DEXTROSE AND SODIUM CHLORIDE: 5; 450 INJECTION, SOLUTION INTRAVENOUS at 16:48

## 2022-10-24 RX ADMIN — ACETAMINOPHEN AND CODEINE PHOSPHATE 0.35 MG: 120; 12 SOLUTION ORAL at 08:47

## 2022-10-24 RX ADMIN — PROCHLORPERAZINE EDISYLATE 10 MG: 5 INJECTION INTRAMUSCULAR; INTRAVENOUS at 03:06

## 2022-10-24 RX ADMIN — SODIUM CHLORIDE 1000 ML: 9 INJECTION, SOLUTION INTRAVENOUS at 00:03

## 2022-10-24 RX ADMIN — CETIRIZINE HYDROCHLORIDE 10 MG: 5 TABLET, FILM COATED ORAL at 08:45

## 2022-10-24 RX ADMIN — LEVOTHYROXINE SODIUM 112 MCG: 0.11 TABLET ORAL at 08:45

## 2022-10-24 ASSESSMENT — LIFESTYLE VARIABLES
HOW OFTEN DO YOU HAVE A DRINK CONTAINING ALCOHOL: NEVER
HOW MANY STANDARD DRINKS CONTAINING ALCOHOL DO YOU HAVE ON A TYPICAL DAY: PATIENT DOES NOT DRINK

## 2022-10-24 ASSESSMENT — PAIN DESCRIPTION - LOCATION
LOCATION: ABDOMEN
LOCATION: ABDOMEN
LOCATION: JAW

## 2022-10-24 ASSESSMENT — PAIN SCALES - GENERAL
PAINLEVEL_OUTOF10: 4
PAINLEVEL_OUTOF10: 6
PAINLEVEL_OUTOF10: 3
PAINLEVEL_OUTOF10: 4
PAINLEVEL_OUTOF10: 0

## 2022-10-24 ASSESSMENT — PAIN DESCRIPTION - ORIENTATION: ORIENTATION: RIGHT

## 2022-10-24 ASSESSMENT — PAIN DESCRIPTION - DESCRIPTORS: DESCRIPTORS: ACHING

## 2022-10-24 NOTE — PLAN OF CARE
Problem: Discharge Planning  Goal: Discharge to home or other facility with appropriate resources  10/24/2022 0911 by Pierce Hamilton RN  Outcome: Progressing  10/24/2022 0910 by Pierce Hamilton RN  Outcome: Progressing     Problem: Cardiovascular - Adult  Goal: Maintains optimal cardiac output and hemodynamic stability  Outcome: Progressing  Goal: Absence of cardiac dysrhythmias or at baseline  Outcome: Progressing     Problem: Gastrointestinal - Adult  Goal: Minimal or absence of nausea and vomiting  Outcome: Progressing  Goal: Maintains or returns to baseline bowel function  Outcome: Progressing  Goal: Maintains adequate nutritional intake  Outcome: Progressing  Goal: Establish and maintain optimal ostomy function  Outcome: Progressing     Problem: Metabolic/Fluid and Electrolytes - Adult  Goal: Electrolytes maintained within normal limits  Outcome: Progressing  Goal: Hemodynamic stability and optimal renal function maintained  Outcome: Progressing  Goal: Glucose maintained within prescribed range  Outcome: Progressing

## 2022-10-24 NOTE — H&P
HOSPITALIST ADMISSION H&P      REASON FOR ADMISSION:  DKA, PRUDENCE superimposed on CKD   ESTIMATED LENGTH OF STAY: > 2 midnights 3-4 days    ATTENDING/ADMITTING PHYSICIAN: Viviana Ragland MD  PCP: Ricardo Blanco DO    HISTORY OF PRESENT ILLNESS:      The patient is a 25 y.o. female patient of Ricardo Blanco DO who presents from ER with c/o emesis and hyperglycemia. Patient report she has had diabetes since , and 3 days ago started having elevated blood sugars and vomiting. Was seen in urgent care on 10/21 for nausea and elevated blood sugars in the 200-300 range. Patient presented to ER last night with blood glucose over 600 per her home machine and > 600 in ER. C/O rib and stomach pain from vomiting, rates 9/10, had emesis upon arrival to floor. Patient has had recent increase in thyroid medication due to past pregnancy and elevated TSH. Patient reports has been having dental pain for about a week due to \"wisdom teeth\", was going to try to make appointment today to see dentist.  PRUDENCE superimosed on CKD: Typically stage 2, currently stage 3a. In ER: IVF bolus x2 L, 20 Units regular insulin, Zofran IV x2, Protonix, Insulin drip started. CXR:   Impression   Clear chest without acute cardiopulmonary process. Wounds and LDAs present prior to admission: None     See below for additional PMH. Patient hsxu-neiiyrppdi-sfqalaxa-available records reviewed, including, but not limited to ER records, imaging results, lab results, office records, personal records, and OARRS -- no signs of abuse or diversion.      Past Medical History:   Diagnosis Date    Abdominal pain     Abnormal stools     Celiac disease 2019    Chronic lymphocytic thyroiditis     Delivery by emergency  section 2022    Diabetic ketoacidosis ([address]     Hyperopia with astigmatism     Hypothyroid     Insomnia     Migraine     Precocious adrenarche ([address]     Precocious puberty     Seasonal allergies     Seizures ([address]     D/T BS out of control, no seizure disorder    Type 1 diabetes mellitus without (mention of) complication     date of diagnosis - 2/15/2010           Past Surgical History:   Procedure Laterality Date    OTHER SURGICAL HISTORY  07/06/2011    SUPPRELIN IMPLANT PLACEMENT FOR PRECOCIOUS PUBERTY    OTHER SURGICAL HISTORY  08/09/2012    SUPPRELIN IMPLANT REMOVAL FOR PRECOCIOUS PUBERTY    TONSILLECTOMY  02/16/2012    UPPER GASTROINTESTINAL ENDOSCOPY  4/12/12       Medications Prior to Admission:    Prior to Visit Medications    Medication Sig Taking? Authorizing Provider   promethazine-dextromethorphan (PROMETHAZINE-DM) 6.25-15 MG/5ML syrup Take 5 mLs by mouth 4 times daily as needed for Cough  Martir DarshanMARIA D CNP   benzonatate (TESSALON) 100 MG capsule Take 1 capsule by mouth 3 times daily as needed for Cough  Martir DarshanMARIA D CNP   ondansetron (ZOFRAN-ODT) 4 MG disintegrating tablet Take 1 tablet by mouth 3 times daily as needed for Nausea or Vomiting  Martir DarshanMARIA D CNP   Continuous Blood Gluc Transmit (DEXCOM G6 TRANSMITTER) MISC Use daily to check blood sugars. Change every 90 days. MARIA D Kennedy CNP   Continuous Blood Gluc Sensor (DEXCOM G6 SENSOR) MISC Use daily to check blood sugars. Change every 10 days. MARIA D Kennedy CNP   levothyroxine (SYNTHROID) 112 MCG tablet Take 1 tablet by mouth in the morning. MARIA D Kennedy CNP   Insulin Degludec (TRESIBA FLEXTOUCH) 200 UNIT/ML SOPN Inject 40 Units into the skin daily  MARIA D Kennedy CNP   insulin lispro, 1 Unit Dial, 100 UNIT/ML SOPN Take 3 times daily.  Max daily dose of  Historical Provider, MD   Lancets (BD LANCET ULTRAFINE 30G) MISC Use to test blood sugar 5 times daily  MARIA D Kennedy CNP   Insulin Pen Needle (B-D ULTRAFINE III SHORT PEN) 31G X 8 MM MISC Use to inject insulin 6 times daily  MARIA D Kennedy CNP   acetone, urine, test (KETOSTIX) strip Test daily as needed for ketones Nicole Strong MARIA D Burdick CNP   blood glucose test strips (TRUETEST TEST) strip Use to test blood sugars 5 times daily  Nicole FaithYAHAIRA renoN - CNP   vitamin D (CHOLECALCIFEROL) 25 MCG (1000 UT) TABS tablet Take 1,000 Units by mouth daily  Historical Provider, MD BOND ONE PACK 3 MG/DOSE POWD   Historical Provider, MD   norethindrone (JOLEEN) 0.35 MG tablet Take 1 tablet by mouth daily  Danette Gaitan,    B-D INS SYR ULTRAFINE 1CC/31G 31G X 5/16\" 1 ML MISC   Historical Provider, MD   cetirizine (ZYRTEC) 10 MG tablet TAKE ONE TABLET BY MOUTH EVERY DAY  Gissell Quinteros   ULTRA-COMFORT INSULIN SYRINGE 31G X 5/16\" 0.5 ML MISC USE ONCE DAILY WITH ESAU Sandhu MD   glucagon (GLUCAGON EMERGENCY) 1 MG injection As directed for extreme hypoglycemia  Tabby Mera MD         Allergies:    Oxycodone, Gluten meal, Insulin detemir, Other, and Codeine    Social History:    reports that she has never smoked. She has been exposed to tobacco smoke. She has never used smokeless tobacco. She reports that she does not currently use drugs after having used the following drugs: Marijuana Daril Joaquim). She reports that she does not drink alcohol. Family History:   family history includes Asthma in her paternal grandmother; Cancer (age of onset: 72) in her maternal grandmother; Diabetes in her father, maternal grandfather, and another family member; Heart Disease in her paternal grandmother; Hypertension in an other family member; Neuropathy in her father; Other in her paternal grandmother; Retinal Detachment in her maternal grandfather. REVIEW OF SYSTEMS:  See HPI and problem list; otherwise no other new complaints with respect to eyes, ENT, neck, pulmonary, coronary, chest, GI, , endocrine, musculoskeletal, immune system/connective tissue disease, hematologic, neurologic, psychiatric, skin, lymphatics, or malignancies. Code status: patient/family wishes for Full Code at this time.     PHYSICAL EXAM:  Vitals:  /87   Pulse (!) 146   Temp 97.5 °F (36.4 °C) (Tympanic)   Resp (!) 33   SpO2 100%     General: awake, alert, cooperative, and well nourished  HEENT:  dental caries, PERRLA, EMOI, External nose normal, Normocephalic, Atraumatic, and Neck with full ROM  Neck: Supple, Carotid Pulses Present, No Bruits, No Masses, Tenderness, Nodularity, and No Lymphadenopathy  Chest/Lungs: Clear to Auscultation without Rales, Rhonchi, or Wheezes and Respirations even and unlabored  Cardiac:  Tachycardia and Pedal Pulses Palpable Bilaterally  GI/Abdomen:  Nausea, Bowel Sounds Present, No Masses, and Soft, no guarding, no rebound tenderness, c/o pain with palpation lower abdomen  : Not examined  Extremities/Musculoskeletal: All four extremities without edema and All four extremities with 5/5 strength  Skin: Skin warm and dry  Neuro: Alert and Oriented, to Person, to Time, to Place, to Situation, No Localizing Signs/Symptoms, follows commands with all 4 extremities, and Strength equal bilaterally  Psychiatric: Normal mood and affect      LABS:    CBC with Differential:    Lab Results   Component Value Date/Time    WBC 19.8 10/23/2022 10:35 PM    RBC 6.35 10/23/2022 10:35 PM    HGB 17.4 10/23/2022 10:35 PM    HCT 53.3 10/23/2022 10:35 PM     10/23/2022 10:35 PM    MCV 83.9 10/23/2022 10:35 PM    MCH 27.4 10/23/2022 10:35 PM    MCHC 32.6 10/23/2022 10:35 PM    RDW 13.5 10/23/2022 10:35 PM    LYMPHOPCT 3 10/23/2022 10:35 PM    MONOPCT 12 10/23/2022 10:35 PM    BASOPCT 0 10/23/2022 10:35 PM    MONOSABS 2.38 10/23/2022 10:35 PM    LYMPHSABS 0.59 10/23/2022 10:35 PM    EOSABS 0.00 10/23/2022 10:35 PM    BASOSABS 0.00 10/23/2022 10:35 PM    DIFFTYPE NOT REPORTED 06/30/2021 11:26 AM     BMP:    Lab Results   Component Value Date/Time     10/23/2022 11:40 PM    K 4.6 10/23/2022 11:40 PM    CL 96 10/23/2022 11:40 PM    CO2 <6 10/23/2022 11:40 PM    BUN 32 10/23/2022 11:40 PM    LABALBU 4.5 06/30/2021 11:26 AM    CREATININE 1.51 10/23/2022 11:40 PM    CALCIUM 9.5 10/23/2022 11:40 PM    GFRAA >60 2022 11:19 AM    LABGLOM 50 10/23/2022 11:40 PM    GLUCOSE 667 10/23/2022 11:40 PM    GLUCOSE 187 2012 07:30 AM     Covid, (-). Glucose >[phone], Magnesium 2.3, TSH 0.43. Urine pending. ASSESSMENT:      Patient Active Problem List   Diagnosis    Diabetes mellitus type I ([facility] [address]    Chronic lymphocytic thyroiditis    Dysmenorrhea    Chronic headaches    Sleep disturbance    Migraine with aura    Hyperopia of both eyes with astigmatism    Weight gain due to medication    Pre-existing type 1 diabetes mellitus during pregnancy    Celiac disease    Hypothyroidism   Patient fbfr-lahewcdmyv-wicwijrc-available records reviewed, including, but not limited to,  ER reports--labs--imaging---office recorts personal note. Esteban Ye  22  WF  RUTHY Penaloza;  Kina Lopez NP--DE]  FULL CODE       INSULIN gtt---10.24.2022  COVID-19--NEGATIVE    Anti-infectives:    Clindamycin IV    DKA---diabetic ketoacidosis--10.24.2022             Seizure--history related to DKA  Diabetes Mellitus Type 1---uncontrolled--10.24.2022  PRUDENCE superposed on CKD---now at Stage 2 with hydration---10.24.2022  Dental pain--likely wisdom tooth infection---POA    Celiac disease  Chronic lymphocytic thyroiditis--now hypothyroidism   Obesity   Marijuana use--quit  2nd hand tobacco smoke exposure  PMH:    insomnia, migraine headaches, precocious adrenarche--adrenal stimulation,                precocious puberty, seasonal allergies  PSH:    Supprelin implant for precocious puberty-----removal--, tonsillectomy--,               EGD--, --emergency---2022    Allergies:  oxycodone--Percocet, GLUTEN, insulin Detemir, codeine         Attending Supervising [de-identified] Attestation Statement  I performed a history and physical examination on the patient and discussed the management with the nurse practitioner.  I reviewed and agree with the findings and plan as documented in her note . Electronically signed by Roxane Romo MD on 10/24/22 at 5:16 PM EDT     PLAN:    1. DKA: HgbA1C, POCT per protocol, Insulin drip, Monitor labs, Diabetic educator, Daily weights, I&O, Urine with reflex to culture, Antiemetics, Hypoglycemic protocol.   2. PRUDENCE superimposed on CKD, dehydration: MIVF, Limit nephrotoxic medications, Monitor renal function, I&O.  3. Dental pain: Pain management    Lovenox, TSH,     Home medications reviewed  See orders     Note that over 50 minutes was spent in evaluation of the patient, review of the chart and pertinent records, discussion with family/staff, etc    Christopher Huggins, MARIA D - NP    1:25 AM  10/24/2022    Add:   dental pain--likely wisdom tooth infection---Clindamycin---needs to see a dentist

## 2022-10-24 NOTE — ED PROVIDER NOTES
eMERGENCY dEPARTMENT eNCOUnter      Pt Name: Mel Thomas  MRN: [id number]  [place] 2000  Date of evaluation: 10/23/2022      CHIEF COMPLAINT       Chief Complaint   Patient presents with    Emesis    Hyperglycemia          HISTORY OF PRESENT ILLNESS    Mel Thomas is a 25 y.o. female who presents emergency department with nausea vomiting and high blood sugar per her glucometer. Her glucometer does not go above 500 so she could not get an accurate reading as ours is the same problem we are  waiting for the lab blood sugar. Patient complains that she has been sweaty as well. REVIEW OF SYSTEMS       Review of Systems   Constitutional: Negative. HENT: Negative. Eyes: Negative. Respiratory: Negative. Cardiovascular: Negative. Gastrointestinal:  Positive for nausea and vomiting. Endocrine: Negative. Genitourinary: Negative. Musculoskeletal: Negative. Skin: Negative. Allergic/Immunologic: Negative. Neurological: Negative. Hematological: Negative. Psychiatric/Behavioral: Negative. PAST MEDICAL HISTORY    has a past medical history of Abdominal pain, Abnormal stools, Acute kidney injury superimposed on CKD ([address] Celiac disease, Chronic lymphocytic thyroiditis, Delivery by emergency  section, Diabetic ketoacidosis ([address] Hyperopia with astigmatism, Hypothyroid, Insomnia, Migraine, Precocious adrenarche ([address] Precocious puberty, Seasonal allergies, Seizures ([address] and Type 1 diabetes mellitus without (mention of) complication. SURGICAL HISTORY      has a past surgical history that includes Upper gastrointestinal endoscopy (12); Tonsillectomy (2012); other surgical history (2011); and other surgical history (2012).     CURRENT MEDICATIONS       Current Discharge Medication List        CONTINUE these medications which have NOT CHANGED    Details   promethazine-dextromethorphan (PROMETHAZINE-DM) 6.25-15 MG/5ML syrup Take 5 mLs by mouth 4 times daily as needed for Cough  Qty: 240 mL, Refills: 0    Associated Diagnoses: Viral URI with cough      benzonatate (TESSALON) 100 MG capsule Take 1 capsule by mouth 3 times daily as needed for Cough  Qty: 30 capsule, Refills: 0    Associated Diagnoses: Viral URI with cough      ondansetron (ZOFRAN-ODT) 4 MG disintegrating tablet Take 1 tablet by mouth 3 times daily as needed for Nausea or Vomiting  Qty: 21 tablet, Refills: 0    Associated Diagnoses: Nausea      Continuous Blood Gluc Transmit (DEXCOM G6 TRANSMITTER) MISC Use daily to check blood sugars. Change every 90 days. Qty: 1 each, Refills: 3    Associated Diagnoses: Type 1 diabetes mellitus without complication (HCC)      Continuous Blood Gluc Sensor (DEXCOM G6 SENSOR) MISC Use daily to check blood sugars. Change every 10 days. Qty: 9 each, Refills: 3    Associated Diagnoses: Type 1 diabetes mellitus without complication (HCC)      levothyroxine (SYNTHROID) 112 MCG tablet Take 1 tablet by mouth in the morning. Qty: 30 tablet, Refills: 3      Insulin Degludec (TRESIBA FLEXTOUCH) 200 UNIT/ML SOPN Inject 40 Units into the skin daily  Qty: 3 pen, Refills: 3    Associated Diagnoses: Type 1 diabetes mellitus without complication (HCC)      insulin lispro, 1 Unit Dial, 100 UNIT/ML SOPN Take 3 times daily.  Max daily dose of      Lancets (BD LANCET ULTRAFINE 30G) MISC Use to test blood sugar 5 times daily  Qty: 200 each, Refills: 11    Associated Diagnoses: Type 1 diabetes mellitus without complication (HCC)      Insulin Pen Needle (B-D ULTRAFINE III SHORT PEN) 31G X 8 MM MISC Use to inject insulin 6 times daily  Qty: 200 each, Refills: 6    Associated Diagnoses: Type 1 diabetes mellitus without complication (HCC)      acetone, urine, test (KETOSTIX) strip Test daily as needed for ketones  Qty: 50 strip, Refills: 2    Comments: NEEDS TO MAKE AN APPT  Associated Diagnoses: Type 1 diabetes mellitus without complication (HCC)      blood glucose test strips (TRUETEST TEST) strip Use to test blood sugars 5 times daily  Qty: 200 each, Refills: 0    Comments: True Metrix brand  Associated Diagnoses: Type 1 diabetes mellitus without complication ([facility])      vitamin D (CHOLECALCIFEROL) 25 MCG (1000 UT) TABS tablet Take 1,000 Units by mouth daily      BAQSIMI ONE PACK 3 MG/DOSE POWD       norethindrone (JOLEEN) 0.35 MG tablet Take 1 tablet by mouth daily  Qty: 28 tablet, Refills: 11      B-D INS SYR ULTRAFINE 1CC/31G 31G X 5/16\" 1 ML MISC       cetirizine (ZYRTEC) 10 MG tablet TAKE ONE TABLET BY MOUTH EVERY DAY  Qty: 30 tablet, Refills: 6      ULTRA-COMFORT INSULIN SYRINGE 31G X 5/16\" 0.5 ML MISC USE ONCE DAILY WITH LANTUS  Qty: 30 each, Refills: 3    Associated Diagnoses: Type I (juvenile type) diabetes mellitus without mention of complication, not stated as uncontrolled      glucagon (GLUCAGON EMERGENCY) 1 MG injection As directed for extreme hypoglycemia  Qty: 1 kit, Refills: 2    Associated Diagnoses: Type 1 diabetes mellitus ([facility] Chronic lymphocytic thyroiditis; Precocious puberty             ALLERGIES     is allergic to oxycodone, gluten meal, insulin detemir, other, and codeine. FAMILY HISTORY     She indicated that her mother is alive. She indicated that her father is alive. She indicated that her maternal grandmother is alive. She indicated that her maternal grandfather is alive. She indicated that her paternal grandmother is alive. She indicated that the status of her other is unknown. She indicated that the status of her neg hx is unknown.     family history includes Asthma in her paternal grandmother; Cancer (age of onset: 72) in her maternal grandmother; Diabetes in her father, maternal grandfather, and another family member; Heart Disease in her paternal grandmother; Hypertension in an other family member; Neuropathy in her father; Other in her paternal grandmother; Retinal Detachment in her maternal grandfather.     SOCIAL HISTORY      reports that she has never smoked. She has been exposed to tobacco smoke. She has never used smokeless tobacco. She reports that she does not currently use drugs after having used the following drugs: Marijuana Velrenan Murphy). She reports that she does not drink alcohol. PHYSICAL EXAM     INITIAL VITALS:  tympanic temperature is 98.1 °F (36.7 °C). Her blood pressure is 151/99 (abnormal) and her pulse is 138 (abnormal). Her respiration is 16 and oxygen saturation is 99%. Constitutional: Alert, oriented x3, nontoxic, afebrile, answering questions appropriately, acting properly for age, in no acute distress  HEENT: Extraocular muscles intact, mucus membranes moist, TMs clear bilaterally, no posterior pharyngeal erythema or exudates, Pupils equal, round, reactive to light,   Neck: Trachea midline, Supple without lymphadenopathy, no posterior midline neck tenderness to palpation  Cardiovascular: Regular rhythm and rate no S3, S4, or murmurs  Respiratory: Clear to auscultation bilaterally no wheezes, rhonchi, rales, no respiratory distress  Gastrointestinal: Soft, nontender, nondistended, positive bowel sounds. No rebound, rigidity, or guarding. Musculoskeletal: No extremity pain or swelling  Neurologic: Moving all 4 extremities without difficulty there are no gross focal neurologic deficits  Skin: Warm and dry    DIFFERENTIAL DIAGNOSIS/ MDM:     Probable DKA, patient will get an evaluation    DIAGNOSTIC RESULTS     EKG: All EKG's are interpreted by the Emergency Department Physician who either signs or Co-signs this chart in the absence of a cardiologist.        Not indicated unless otherwise documented above    LABS:  Results for orders placed or performed during the hospital encounter of 10/23/22   COVID-19, Rapid    Specimen: Nasopharyngeal Swab   Result Value Ref Range    Specimen Description . NASOPHARYNGEAL SWAB     SARS-CoV-2, Rapid Not Detected Not Detected   CBC with Auto Differential   Result Value Ref Range    WBC 19.8 (H) 4.5 - 13.5 k/uL    RBC 6.35 (H) 3.95 - 5.11 m/uL    Hemoglobin 17.4 (H) 11.9 - 15.1 g/dL    Hematocrit 53.3 (H) 36.3 - 47.1 %    MCV 83.9 82.6 - 102.9 fL    MCH 27.4 25.2 - 33.5 pg    MCHC 32.6 25.2 - 33.5 g/dL    RDW 13.5 11.8 - 14.4 %    Platelets 714 (H) 469 - 453 k/uL    MPV 10.3 8.1 - 13.5 fL    NRBC Automated 0.0 0.0 per 100 WBC    Monocytes 12 (H) 4 - 11 %    Lymphocytes 3 (L) 16 - 46 %    Seg Neutrophils 84 (H) 43 - 77 %    Eosinophils % 0 (L) 1 - 7 %    Basophils 0 0 - 1 %    Immature Granulocytes 1 (H) 0 %    Absolute Mono # 2.38 (H) 0.1 - 1.4 k/uL    Absolute Lymph # 0.59 (L) 1.0 - 4.8 k/uL    Segs Absolute 16.63 (H) 1.50 - 8.10 k/uL    Absolute Eos # 0.00 0.0 - 0.4 k/uL    Basophils Absolute 0.00 0.0 - 0.2 k/uL    Absolute Immature Granulocyte 0.20 0.00 - 0.30 k/uL    Morphology Platelet scan shows Increased Platelets     Morphology RBC morphology normal.    SPECIMEN REJECTION   Result Value Ref Range    Specimen Source ER     Ordered Test BMP     Reason for Rejection Unable to perform testing: Specimen hemolyzed. Basic Metabolic Panel   Result Value Ref Range    Glucose 667 (HH) 70 - 99 mg/dL    BUN 32 (H) 6 - 20 mg/dL    Creatinine 1.51 (H) 0.50 - 0.90 mg/dL    Est, Glom Filt Rate 50 (L) >60 mL/min/1.73m2    Bun/Cre Ratio 21 (H) 9 - 20    Calcium 9.5 8.6 - 10.4 mg/dL    Sodium 135 135 - 144 mmol/L    Potassium 4.6 3.7 - 5.3 mmol/L    Chloride 96 (L) 98 - 107 mmol/L    CO2 <6 (L) 20 - 31 mmol/L    Anion Gap  9 - 17 mmol/L     Unable to calculate anion gap due to CO2 less than 6.    Beta-Hydroxybutyrate   Result Value Ref Range    Beta-Hydroxybutyrate 8.49 (H) 0.02 - 0.27 mmol/L   Magnesium   Result Value Ref Range    Magnesium 2.3 1.6 - 2.6 mg/dL   TSH WITH REFLEX   Result Value Ref Range    TSH 0.43 0.30 - 5.00 uIU/mL   POC Glucose Fingerstick   Result Value Ref Range    POC Glucose >600 (HH) 65 - 105 mg/dL   POC Glucose Fingerstick   Result Value Ref Range    POC Glucose >600 (HH) 65 - 105 mg/dL POC Glucose Fingerstick   Result Value Ref Range    POC Glucose 523 (HH) 65 - 105 mg/dL   POC Glucose Fingerstick   Result Value Ref Range    POC Glucose 422 (HH) 65 - 105 mg/dL   POC Glucose Fingerstick   Result Value Ref Range    POC Glucose 406 (HH) 65 - 105 mg/dL   POC Glucose Fingerstick   Result Value Ref Range    POC Glucose 413 (HH) 65 - 105 mg/dL   POC Glucose Fingerstick   Result Value Ref Range    POC Glucose 260 (H) 65 - 105 mg/dL   POC Glucose Fingerstick   Result Value Ref Range    POC Glucose 260 (H) 65 - 105 mg/dL       Not indicated unless otherwise documented above    RADIOLOGY:   I reviewed the radiologist interpretations:  XR CHEST PORTABLE   Final Result   Clear chest without acute cardiopulmonary process.              Not indicated unless otherwise documented above    EMERGENCY DEPARTMENT COURSE:     The patient was given the following medications:  Orders Placed This Encounter   Medications    0.9 % sodium chloride bolus    ondansetron (ZOFRAN) injection 4 mg    insulin regular (HUMULIN R;NOVOLIN R) injection 20 Units    pantoprazole (PROTONIX) 40 mg in sodium chloride (PF) 10 mL injection    pantoprazole (PROTONIX) 40 MG injection     Loel : cabinet override    ondansetron (ZOFRAN) injection 4 mg    0.9 % sodium chloride bolus    DISCONTD: insulin (HumuLIN R) 100 units in sodium chloride 0.9% 100 mL infusion    insulin regular (HumuLIN R;NovoLIN R) infusion     Bibi Terrell: cabinet override    benzonatate (TESSALON) capsule 100 mg    cetirizine (ZYRTEC) tablet 10 mg    Insulin Degludec SOPN 40 Units    levothyroxine (SYNTHROID) tablet 112 mcg    norethindrone (MICRONOR) tablet 0.35 mg    promethazine-dextromethorphan (PROMETHAZINE-DM) 6.25-15 MG/5ML syrup 5 mL    vitamin D (CHOLECALCIFEROL) tablet 1,000 Units    OR Linked Order Group     dextrose bolus 10% 125 mL     dextrose bolus 10% 250 mL    potassium chloride 10 mEq/100 mL IVPB (Peripheral Line)    magnesium sulfate 1000 mg in dextrose 5% 100 mL IVPB    OR Linked Order Group     sodium phosphate 10 mmol in sodium chloride 0.9 % 250 mL IVPB     sodium phosphate 15 mmol in dextrose 5 % 250 mL IVPB     sodium phosphate 20 mmol in dextrose 5 % 500 mL IVPB    polyethylene glycol (GLYCOLAX) packet 17 g    enoxaparin (LOVENOX) injection 40 mg     Order Specific Question:   Indication of Use     Answer:   Prophylaxis-DVT/PE    0.45 % sodium chloride infusion    dextrose 5 % and 0.45 % sodium chloride infusion    insulin regular (HUMULIN R;NOVOLIN R) 100 Units in sodium chloride 0.9 % 100 mL infusion    glucose chewable tablet 16 g    OR Linked Order Group     dextrose bolus 10% 125 mL     dextrose bolus 10% 250 mL    glucagon (rDNA) injection 1 mg    dextrose 10 % infusion    OR Linked Order Group     morphine (PF) injection 1 mg     morphine (PF) injection 2 mg    OR Linked Order Group     traMADol (ULTRAM) tablet 50 mg     traMADol (ULTRAM) tablet 100 mg    prochlorperazine (COMPAZINE) injection 10 mg        Vitals:    Vitals:    10/24/22 0145 10/24/22 0200 10/24/22 0306 10/24/22 0336   BP: (!) 137/110 (!) 151/99     Pulse: (!) 127 (!) 138     Resp: 19 22 16 16   Temp:  98.1 °F (36.7 °C)     TempSrc:  Tympanic     SpO2: 100% 99%       -------------------------  BP (!) 151/99   Pulse (!) 138   Temp 98.1 °F (36.7 °C) (Tympanic)   Resp 16   LMP 10/17/2022   SpO2 99%         I have reviewed the disposition diagnosis with the patient and or their family/guardian. I have answered their questions and given discharge instructions. They voiced understanding of these instructions and did not have any further questions or complaints. CRITICAL CARE:    None    CONSULTS:    None    PROCEDURES:    None      OARRS Report if indicated             FINAL IMPRESSION      1.  Diabetic ketoacidosis without coma associated with type 1 diabetes mellitus ([facility] [address]          DISPOSITION/PLAN   DISPOSITION Admitted    I have reviewed the disposition diagnosis with the patient and or their family/guardian. I have answered their questions and given discharge instructions. They voiced understanding of these instructions and did not have any further questions or complaints. Reevaluation patient's blood sugar initially was probably about 875 she has come down to about 523 with fluids and some insulin. She is on insulin drip at this point in time I have spoken to Savannah Acevedo for the admission. PATIENT REFERRED TO:  No follow-up provider specified.     DISCHARGE MEDICATIONS:  Current Discharge Medication List          (Please note that portions of this note were completed with a voice recognition program.  Efforts were made to edit the dictations but occasionally words are mis-transcribed.)    Dennis Petersen MD  Attending Emergency Physician           Dennis Petersen MD  10/24/22 8057

## 2022-10-24 NOTE — PROGRESS NOTES
rounding in PCU. Assessment: Patient was sleeping. 10/24/22 1612   Encounter Summary   Encounter Overview/Reason  Attempted Encounter   Service Provided For: Patient   Referral/Consult From: Rounding   Support System Unknown  (patient was sleeping)   Last Encounter  10/24/22   Complexity of Encounter Low   Begin Time 1515   End Time  1516   Total Time Calculated 1 min   Assessment/Intervention/Outcome   Assessment Unable to assess  (patient was sleeping)   Intervention Other (Comment)  (patient was sleeping)   Outcome Other (comment)  (patient was sleeping)   Plan and Referrals   Plan/Referrals Continue to visit, (comment)     Plan: Chaplains are available on site or on call 24/7 for spiritual and emotional support.     Electronically signed by Negar Briggs on 10/24/2022 at 4:13 PM

## 2022-10-24 NOTE — CARE COORDINATION
Case Management Initial Discharge Plan  James Thomas             Met with:patient and mother to discuss discharge plans. Information verified: address, contacts, phone number, , and insurance: Yes  Insurance Provider: Primary:  Payor: Toi Ross / Plan: Toi Ross / Product Type: *No Product type* /                                         Emergency Contact/Next of Kin name & number: verified  Who are involved in patient's support system? mother    PCP: Danette Gaitan DO  Date of last visit: see chart    Discharge Planning    Living Arrangements:  Family Members     Home has 2 stories  stairs to climb to get into front door    Patient able to perform ADL's:Independent    Current Services (outpatient & in home) none    Is patient receiving oral anticoagulation therapy? No    Potential Assistance Needed:  N/A    Patient agreeable to home care: n/a  Freedom of choice provided:  no    Prior SNF/Rehab Placement and Facility: no  Agreeable to SNF/Rehab: n/a  Remer of choice provided: no      Expected Discharge date:       Patient expects to be discharged to: If home: is the family and/or caregiver wiling & able to provide support at home? yes  Who will be providing this support? mother    Follow Up Appointment: Best Day/ Time:      Transportation provider: family  Transportation arrangements needed for discharge: No    Readmission Risk              Risk of Unplanned Readmission:  16             Does patient have a readmission risk score greater than 20?: No  If yes, follow-up appointment must be made within 7 days of discharge.      Goals of Care:       Educated patient and mother on transitional options and are agreeable with plan      Discharge Plan: home without needs          Electronically signed by Yessy Hawkins RN on 10/24/22 at 10:28 AM EDT

## 2022-10-24 NOTE — PROGRESS NOTES
SW attempted to meet with patient to complete assessment. Patient resting at this time. SW will continue to monitor needs and assist as appropriate.      Electronically signed by BRI Freeman on 10/24/2022 at 3:09 PM

## 2022-10-25 LAB
ABSOLUTE EOS #: <0.03 K/UL (ref 0–0.44)
ABSOLUTE IMMATURE GRANULOCYTE: <0.03 K/UL (ref 0–0.3)
ABSOLUTE LYMPH #: 2.53 K/UL (ref 1.1–3.7)
ABSOLUTE MONO #: 1.05 K/UL (ref 0.1–1.4)
ANION GAP SERPL CALCULATED.3IONS-SCNC: 11 MMOL/L (ref 9–17)
ANION GAP SERPL CALCULATED.3IONS-SCNC: 12 MMOL/L (ref 9–17)
BASOPHILS # BLD: 0 % (ref 0–2)
BASOPHILS ABSOLUTE: 0.03 K/UL (ref 0–0.2)
BETA-HYDROXYBUTYRATE: 0.51 MMOL/L (ref 0.02–0.27)
BUN BLDV-MCNC: 12 MG/DL (ref 6–20)
BUN BLDV-MCNC: 13 MG/DL (ref 6–20)
BUN BLDV-MCNC: 6 MG/DL (ref 6–20)
BUN BLDV-MCNC: 9 MG/DL (ref 6–20)
BUN/CREAT BLD: 11 (ref 9–20)
BUN/CREAT BLD: 16 (ref 9–20)
BUN/CREAT BLD: 19 (ref 9–20)
BUN/CREAT BLD: 19 (ref 9–20)
CALCIUM SERPL-MCNC: 8.9 MG/DL (ref 8.6–10.4)
CALCIUM SERPL-MCNC: 9 MG/DL (ref 8.6–10.4)
CALCIUM SERPL-MCNC: 9.3 MG/DL (ref 8.6–10.4)
CALCIUM SERPL-MCNC: 9.3 MG/DL (ref 8.6–10.4)
CHLORIDE BLD-SCNC: 109 MMOL/L (ref 98–107)
CHLORIDE BLD-SCNC: 111 MMOL/L (ref 98–107)
CHLORIDE BLD-SCNC: 113 MMOL/L (ref 98–107)
CHLORIDE BLD-SCNC: 114 MMOL/L (ref 98–107)
CO2: 13 MMOL/L (ref 20–31)
CO2: 14 MMOL/L (ref 20–31)
CO2: 17 MMOL/L (ref 20–31)
CO2: 19 MMOL/L (ref 20–31)
CREAT SERPL-MCNC: 0.53 MG/DL (ref 0.5–0.9)
CREAT SERPL-MCNC: 0.58 MG/DL (ref 0.5–0.9)
CREAT SERPL-MCNC: 0.62 MG/DL (ref 0.5–0.9)
CREAT SERPL-MCNC: 0.67 MG/DL (ref 0.5–0.9)
EOSINOPHILS RELATIVE PERCENT: 0 % (ref 1–4)
GFR SERPL CREATININE-BSD FRML MDRD: >60 ML/MIN/1.73M2
GLUCOSE BLD-MCNC: 104 MG/DL (ref 65–105)
GLUCOSE BLD-MCNC: 104 MG/DL (ref 65–105)
GLUCOSE BLD-MCNC: 109 MG/DL (ref 65–105)
GLUCOSE BLD-MCNC: 116 MG/DL (ref 65–105)
GLUCOSE BLD-MCNC: 125 MG/DL (ref 65–105)
GLUCOSE BLD-MCNC: 127 MG/DL (ref 65–105)
GLUCOSE BLD-MCNC: 129 MG/DL (ref 65–105)
GLUCOSE BLD-MCNC: 132 MG/DL (ref 65–105)
GLUCOSE BLD-MCNC: 135 MG/DL (ref 65–105)
GLUCOSE BLD-MCNC: 141 MG/DL (ref 65–105)
GLUCOSE BLD-MCNC: 143 MG/DL (ref 70–99)
GLUCOSE BLD-MCNC: 146 MG/DL (ref 65–105)
GLUCOSE BLD-MCNC: 163 MG/DL (ref 65–105)
GLUCOSE BLD-MCNC: 166 MG/DL (ref 65–105)
GLUCOSE BLD-MCNC: 169 MG/DL (ref 70–99)
GLUCOSE BLD-MCNC: 171 MG/DL (ref 65–105)
GLUCOSE BLD-MCNC: 171 MG/DL (ref 65–105)
GLUCOSE BLD-MCNC: 175 MG/DL (ref 65–105)
GLUCOSE BLD-MCNC: 176 MG/DL (ref 65–105)
GLUCOSE BLD-MCNC: 176 MG/DL (ref 70–99)
GLUCOSE BLD-MCNC: 184 MG/DL (ref 65–105)
GLUCOSE BLD-MCNC: 188 MG/DL (ref 65–105)
GLUCOSE BLD-MCNC: 192 MG/DL (ref 65–105)
GLUCOSE BLD-MCNC: 198 MG/DL (ref 70–99)
GLUCOSE BLD-MCNC: 209 MG/DL (ref 65–105)
GLUCOSE BLD-MCNC: 213 MG/DL (ref 65–105)
GLUCOSE BLD-MCNC: 85 MG/DL (ref 65–105)
GLUCOSE BLD-MCNC: 87 MG/DL (ref 65–105)
GLUCOSE BLD-MCNC: 97 MG/DL (ref 65–105)
HCT VFR BLD CALC: 36.3 % (ref 36.3–47.1)
HEMOGLOBIN: 12.3 G/DL (ref 11.9–15.1)
IMMATURE GRANULOCYTES: 0 %
LYMPHOCYTES # BLD: 28 % (ref 25–45)
MAGNESIUM: 1.9 MG/DL (ref 1.6–2.6)
MCH RBC QN AUTO: 27.2 PG (ref 25.2–33.5)
MCHC RBC AUTO-ENTMCNC: 33.9 G/DL (ref 25.2–33.5)
MCV RBC AUTO: 80.3 FL (ref 82.6–102.9)
MONOCYTES # BLD: 12 % (ref 2–8)
NRBC AUTOMATED: 0 PER 100 WBC
PDW BLD-RTO: 13.6 % (ref 11.8–14.4)
PHOSPHORUS: 1 MG/DL (ref 2.6–4.5)
PHOSPHORUS: 1.2 MG/DL (ref 2.6–4.5)
PHOSPHORUS: 1.4 MG/DL (ref 2.6–4.5)
PHOSPHORUS: 1.5 MG/DL (ref 2.6–4.5)
PLATELET # BLD: 392 K/UL (ref 138–453)
PMV BLD AUTO: 9.5 FL (ref 8.1–13.5)
POTASSIUM SERPL-SCNC: 3.6 MMOL/L (ref 3.7–5.3)
POTASSIUM SERPL-SCNC: 3.8 MMOL/L (ref 3.7–5.3)
POTASSIUM SERPL-SCNC: 3.9 MMOL/L (ref 3.7–5.3)
POTASSIUM SERPL-SCNC: 4 MMOL/L (ref 3.7–5.3)
RBC # BLD: 4.52 M/UL (ref 3.95–5.11)
RBC # BLD: ABNORMAL 10*6/UL
SEG NEUTROPHILS: 59 % (ref 34–64)
SEGMENTED NEUTROPHILS ABSOLUTE COUNT: 5.27 K/UL (ref 1.5–8.1)
SODIUM BLD-SCNC: 137 MMOL/L (ref 135–144)
SODIUM BLD-SCNC: 139 MMOL/L (ref 135–144)
SODIUM BLD-SCNC: 139 MMOL/L (ref 135–144)
SODIUM BLD-SCNC: 140 MMOL/L (ref 135–144)
WBC # BLD: 8.9 K/UL (ref 4.5–13.5)

## 2022-10-25 PROCEDURE — 6370000000 HC RX 637 (ALT 250 FOR IP): Performed by: INTERNAL MEDICINE

## 2022-10-25 PROCEDURE — 6370000000 HC RX 637 (ALT 250 FOR IP)

## 2022-10-25 PROCEDURE — 36415 COLL VENOUS BLD VENIPUNCTURE: CPT

## 2022-10-25 PROCEDURE — 82010 KETONE BODYS QUAN: CPT

## 2022-10-25 PROCEDURE — 99232 SBSQ HOSP IP/OBS MODERATE 35: CPT | Performed by: INTERNAL MEDICINE

## 2022-10-25 PROCEDURE — 84100 ASSAY OF PHOSPHORUS: CPT

## 2022-10-25 PROCEDURE — 2500000003 HC RX 250 WO HCPCS

## 2022-10-25 PROCEDURE — 83735 ASSAY OF MAGNESIUM: CPT

## 2022-10-25 PROCEDURE — 85025 COMPLETE CBC W/AUTO DIFF WBC: CPT

## 2022-10-25 PROCEDURE — 80048 BASIC METABOLIC PNL TOTAL CA: CPT

## 2022-10-25 PROCEDURE — 2500000003 HC RX 250 WO HCPCS: Performed by: INTERNAL MEDICINE

## 2022-10-25 PROCEDURE — 2580000003 HC RX 258

## 2022-10-25 PROCEDURE — 2000000000 HC ICU R&B

## 2022-10-25 PROCEDURE — 6360000002 HC RX W HCPCS

## 2022-10-25 RX ORDER — POTASSIUM CHLORIDE 20 MEQ/1
40 TABLET, EXTENDED RELEASE ORAL PRN
Status: DISCONTINUED | OUTPATIENT
Start: 2022-10-25 | End: 2022-10-26 | Stop reason: HOSPADM

## 2022-10-25 RX ORDER — INSULIN LISPRO 100 [IU]/ML
10 INJECTION, SOLUTION INTRAVENOUS; SUBCUTANEOUS
Status: DISCONTINUED | OUTPATIENT
Start: 2022-10-25 | End: 2022-10-26

## 2022-10-25 RX ORDER — CLINDAMYCIN PHOSPHATE 600 MG/50ML
600 INJECTION INTRAVENOUS EVERY 8 HOURS
Status: DISCONTINUED | OUTPATIENT
Start: 2022-10-25 | End: 2022-10-25

## 2022-10-25 RX ORDER — CLINDAMYCIN HYDROCHLORIDE 150 MG/1
600 CAPSULE ORAL EVERY 8 HOURS SCHEDULED
Status: DISCONTINUED | OUTPATIENT
Start: 2022-10-25 | End: 2022-10-25 | Stop reason: DRUGHIGH

## 2022-10-25 RX ORDER — POTASSIUM CHLORIDE 7.45 MG/ML
10 INJECTION INTRAVENOUS PRN
Status: DISCONTINUED | OUTPATIENT
Start: 2022-10-25 | End: 2022-10-26 | Stop reason: HOSPADM

## 2022-10-25 RX ORDER — INSULIN GLARGINE 100 [IU]/ML
40 INJECTION, SOLUTION SUBCUTANEOUS
Status: DISCONTINUED | OUTPATIENT
Start: 2022-10-25 | End: 2022-10-26

## 2022-10-25 RX ORDER — INSULIN GLARGINE 100 [IU]/ML
INJECTION, SOLUTION SUBCUTANEOUS
Status: DISPENSED
Start: 2022-10-25 | End: 2022-10-25

## 2022-10-25 RX ORDER — PANTOPRAZOLE SODIUM 40 MG/1
40 TABLET, DELAYED RELEASE ORAL
Status: DISCONTINUED | OUTPATIENT
Start: 2022-10-25 | End: 2022-10-26 | Stop reason: HOSPADM

## 2022-10-25 RX ORDER — PANTOPRAZOLE SODIUM 40 MG/1
TABLET, DELAYED RELEASE ORAL
Status: DISPENSED
Start: 2022-10-25 | End: 2022-10-25

## 2022-10-25 RX ORDER — CLINDAMYCIN HYDROCHLORIDE 150 MG/1
450 CAPSULE ORAL EVERY 8 HOURS SCHEDULED
Status: DISCONTINUED | OUTPATIENT
Start: 2022-10-25 | End: 2022-10-26 | Stop reason: HOSPADM

## 2022-10-25 RX ADMIN — POTASSIUM CHLORIDE 10 MEQ: 7.46 INJECTION, SOLUTION INTRAVENOUS at 03:14

## 2022-10-25 RX ADMIN — INSULIN GLARGINE 40 UNITS: 100 INJECTION, SOLUTION SUBCUTANEOUS at 10:01

## 2022-10-25 RX ADMIN — DEXTROSE AND SODIUM CHLORIDE: 5; 450 INJECTION, SOLUTION INTRAVENOUS at 00:30

## 2022-10-25 RX ADMIN — DEXTROSE AND SODIUM CHLORIDE: 5; 450 INJECTION, SOLUTION INTRAVENOUS at 23:37

## 2022-10-25 RX ADMIN — INSULIN LISPRO 10 UNITS: 100 INJECTION, SOLUTION INTRAVENOUS; SUBCUTANEOUS at 12:03

## 2022-10-25 RX ADMIN — PANTOPRAZOLE SODIUM 40 MG: 40 TABLET, DELAYED RELEASE ORAL at 10:00

## 2022-10-25 RX ADMIN — POTASSIUM CHLORIDE 10 MEQ: 7.46 INJECTION, SOLUTION INTRAVENOUS at 06:04

## 2022-10-25 RX ADMIN — SODIUM PHOSPHATE, MONOBASIC, MONOHYDRATE 20 MMOL: 276; 142 INJECTION, SOLUTION INTRAVENOUS at 14:50

## 2022-10-25 RX ADMIN — POTASSIUM CHLORIDE 10 MEQ: 7.46 INJECTION, SOLUTION INTRAVENOUS at 08:00

## 2022-10-25 RX ADMIN — POTASSIUM CHLORIDE 10 MEQ: 7.46 INJECTION, SOLUTION INTRAVENOUS at 02:32

## 2022-10-25 RX ADMIN — POTASSIUM CHLORIDE 10 MEQ: 7.46 INJECTION, SOLUTION INTRAVENOUS at 01:38

## 2022-10-25 RX ADMIN — POTASSIUM CHLORIDE 10 MEQ: 7.46 INJECTION, SOLUTION INTRAVENOUS at 07:01

## 2022-10-25 RX ADMIN — ENOXAPARIN SODIUM 40 MG: 100 INJECTION SUBCUTANEOUS at 07:58

## 2022-10-25 RX ADMIN — CHOLECALCIFEROL TAB 25 MCG (1000 UNIT) 1000 UNITS: 25 TAB at 07:58

## 2022-10-25 RX ADMIN — CLINDAMYCIN HYDROCHLORIDE 450 MG: 150 CAPSULE ORAL at 13:34

## 2022-10-25 RX ADMIN — DEXTROSE AND SODIUM CHLORIDE: 5; 450 INJECTION, SOLUTION INTRAVENOUS at 16:57

## 2022-10-25 RX ADMIN — INSULIN LISPRO 10 UNITS: 100 INJECTION, SOLUTION INTRAVENOUS; SUBCUTANEOUS at 16:57

## 2022-10-25 RX ADMIN — ACETAMINOPHEN AND CODEINE PHOSPHATE 0.35 MG: 120; 12 SOLUTION ORAL at 07:58

## 2022-10-25 RX ADMIN — CLINDAMYCIN HYDROCHLORIDE 450 MG: 150 CAPSULE ORAL at 20:58

## 2022-10-25 RX ADMIN — CLINDAMYCIN PHOSPHATE 600 MG: 600 INJECTION, SOLUTION INTRAVENOUS at 00:06

## 2022-10-25 RX ADMIN — CETIRIZINE HYDROCHLORIDE 10 MG: 5 TABLET, FILM COATED ORAL at 07:58

## 2022-10-25 RX ADMIN — DEXTROSE AND SODIUM CHLORIDE: 5; 450 INJECTION, SOLUTION INTRAVENOUS at 10:02

## 2022-10-25 RX ADMIN — LEVOTHYROXINE SODIUM 112 MCG: 0.11 TABLET ORAL at 07:58

## 2022-10-25 ASSESSMENT — PAIN SCALES - GENERAL
PAINLEVEL_OUTOF10: 0
PAINLEVEL_OUTOF10: 0

## 2022-10-25 NOTE — PROGRESS NOTES
DISCHARGE BARRIERS       Reason for Referral:  SW completed a Psychosocial Assessment for evaluation of patient's mental health, social status, and functional capacity within the community. JoA-nn Salazar is a 25 y.o. female admitted due to Diabetic ketoacids without coma associated with type 1 diabetes mellitus. Mental Status:  Alert, oriented, and engaging during assessment. Decision Making:  Makes own decisions. Family/Social/Home Environment: Lives with son and boyfriend in [address]: Discussed a good social support network     Current Services: SNAP (food stamps), and Medicaid (medical assistance). Current DMEs: Patient states she will have a Dexcom. PCP: Skinny Ramirez, DO and repots no issues affording medication.  status:  None     ADLs and means of transportation: Independent in ADLs prior to hospitalization and unable to transport self. Food insecurity or needed financial assistance: Denies any food insecurity or financial concerns at this time. ACP and Code Status:  Jo-Ann Salazar is a Full Code status and does not have an Advance Directive. SW discussed an Advance Directive which included the patient's choices for care and treatment in the case of a health event that adversely affects decision-making abilities. SW provided education and resources. Jo-Ann Salazar has no questions at this time and has agreed to keep me up-to-date should anything change. Collaborative List of SNF/ECF/HH were provided: offered, declined No discharge order at this time. Anticipated Needs/Discharge Plan:  Spoke with patient/family/representative about discharge plan. Patient/Family/Representative verbalizes understanding of the plan of care and denies discharge needs or further services at this time. SW provided business card. SW will continue to monitor needs and assist as appropriate.            Electronically signed by BRI Gallo on 10/25/2022 at 1:57 PM

## 2022-10-25 NOTE — PROGRESS NOTES
Hospitalist Progress Note    Patient:  Maxine Kessler     YOB: 2000    MRN: [id number]   Admit date: 10/23/2022     Acct: [de-identified]     PCP: DO GEORGES Tyson--Interval History:      DKA--on insulin gtt----BG improved---still acidotic---cont'd IVF and insulin gtt. Starting Lantus--log concomitantly with insulin gtt    Diet---diabetic--gluten free    See note below         All other ROS negative except noted in HPI    Diet:  ADULT DIET; Clear Liquid; 4 carb choices (60 gm/meal);  Low Fat/Low Chol/High Fiber/2 gm Na; Gluten Free    Medications:  Scheduled Meds:   cetirizine  10 mg Oral Daily    [Held by provider] Insulin Degludec  40 Units SubCUTAneous Daily    levothyroxine  112 mcg Oral Daily    norethindrone  1 tablet Oral Daily    Vitamin D  1,000 Units Oral Daily    enoxaparin  40 mg SubCUTAneous Daily    clindamycin (CLEOCIN) IV  600 mg IntraVENous Q8H    pantoprazole (PROTONIX) 40 mg injection  40 mg IntraVENous Daily     Continuous Infusions:   sodium chloride Stopped (10/25/22 0030)    dextrose 5 % and 0.45 % NaCl 150 mL/hr at 10/25/22 0622    insulin 3.09 Units/hr (10/25/22 0754)    dextrose       PRN Meds:benzonatate, promethazine-dextromethorphan, dextrose bolus **OR** dextrose bolus, potassium chloride, magnesium sulfate, sodium phosphate IVPB **OR** sodium phosphate IVPB **OR** sodium phosphate IVPB, polyethylene glycol, dextrose 5 % and 0.45 % NaCl, glucose, dextrose bolus **OR** dextrose bolus, glucagon (rDNA), dextrose, morphine **OR** morphine, traMADol **OR** traMADol, prochlorperazine    Objective:  Labs:  CBC with Differential:    Lab Results   Component Value Date/Time    WBC 8.9 10/25/2022 05:29 AM    RBC 4.52 10/25/2022 05:29 AM    HGB 12.3 10/25/2022 05:29 AM    HCT 36.3 10/25/2022 05:29 AM     10/25/2022 05:29 AM    MCV 80.3 10/25/2022 05:29 AM    MCH 27.2 10/25/2022 05:29 AM    MCHC 33.9 10/25/2022 05:29 AM    RDW 13.6 10/25/2022 05:29 AM    LYMPHOPCT 28 10/25/2022 05:29 AM    MONOPCT 12 10/25/2022 05:29 AM    BASOPCT 0 10/25/2022 05:29 AM    MONOSABS 1.05 10/25/2022 05:29 AM    LYMPHSABS 2.53 10/25/2022 05:29 AM    EOSABS <0.03 10/25/2022 05:29 AM    BASOSABS 0.03 10/25/2022 05:29 AM    DIFFTYPE NOT REPORTED 06/30/2021 11:26 AM     BMP:    Lab Results   Component Value Date/Time     10/25/2022 05:29 AM    K 4.0 10/25/2022 05:29 AM     10/25/2022 05:29 AM    CO2 13 10/25/2022 05:29 AM    BUN 12 10/25/2022 05:29 AM    LABALBU 4.5 06/30/2021 11:26 AM    CREATININE 0.62 10/25/2022 05:29 AM    CALCIUM 9.0 10/25/2022 05:29 AM    GFRAA >60 04/28/2022 11:19 AM    LABGLOM >60 10/25/2022 05:29 AM    GLUCOSE 198 10/25/2022 05:29 AM    GLUCOSE 187 04/12/2012 07:30 AM           Physical Exam:  Vitals: /71   Pulse 84   Temp 98.3 °F (36.8 °C) (Tympanic)   Resp 19   Wt 174 lb 11.2 oz (79.2 kg)   LMP 10/17/2022   SpO2 98%   BMI 28.89 kg/m²   24 hour intake/output:  Intake/Output Summary (Last 24 hours) at 10/25/2022 0808  Last data filed at 10/25/2022 0622  Gross per 24 hour   Intake 6349.7 ml   Output --   Net 6349.7 ml     Last 3 weights: Wt Readings from Last 3 Encounters:   10/25/22 174 lb 11.2 oz (79.2 kg)   10/21/22 175 lb 3.2 oz (79.5 kg)   09/12/22 180 lb (81.6 kg)     HEENT: Normocephalic and Atraumatic  Neck: Supple, No Masses, Tenderness, Nodularity, and No Lymphadenopathy  Chest/Lungs: Clear to Auscultation without Rales, Rhonchi, or Wheezes  Cardiac: Regular Rate and Rhythm  GI/Abdomen:  Bowel Sounds Present and Soft, Non-tender, without Guarding or Rebound Tenderness  : Not examined  EXT/Skin: No Edema, No Cyanosis, and No Clubbing  Neuro:  generalized weakness, Alert and Oriented, to Person, to Time, to Place, to Situation, and No Localizing Signs/Symptoms      Assessment:    Principal Problem:    Diabetic ketoacidosis without coma associated with type 1 diabetes mellitus ([facility] [address]  Active Problems:    Acute kidney injury superimposed on CKD [facility])  Resolved Problems:    * No resolved hospital problems.  Rimma Angry   M  22  WF  RUTHY Baptiste;  Torri Hooks NP--DE]  FULL CODE       INSULIN gtt---10.24.2022  COVID-19--NEGATIVE    Anti-infectives:    Clindamycin IV    DKA---diabetic ketoacidosis--10.24.2022             Seizure--history related to DKA  Diabetes Mellitus Type 1---uncontrolled--10.24.2022  PRUDENCE superposed on CKD---now at Stage 2 with hydration---10.24.2022  Dental pain--likely wisdom tooth infection---POA    Celiac disease  Chronic lymphocytic thyroiditis--now hypothyroidism   Obesity   Marijuana use--quit  2nd hand tobacco smoke exposure  PMH:    insomnia, migraine headaches, precocious adrenarche--adrenal stimulation,                precocious puberty, seasonal allergies  PSH:    Supprelin implant for precocious puberty-----removal--, tonsillectomy--,               EGD--, --emergency---2022    Allergies:  oxycodone--Percocet, GLUTEN, insulin Detemir, codeine           Plan:     DM1---DKA---insulin gtt + Lantus-log     See orders     Electronically signed by Rick Hauser MD on 10/25/2022 at 8:08 AM    Hospitalist
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
## 2022-10-25 NOTE — PLAN OF CARE
Problem: Discharge Planning  Goal: Discharge to home or other facility with appropriate resources  Outcome: Progressing     Problem: Cardiovascular - Adult  Goal: Maintains optimal cardiac output and hemodynamic stability  Outcome: Progressing  Flowsheets (Taken 10/24/2022 2340 by Fayrene Lesches, RN)  Maintains optimal cardiac output and hemodynamic stability: Monitor blood pressure and heart rate  Goal: Absence of cardiac dysrhythmias or at baseline  Outcome: Progressing  Flowsheets (Taken 10/24/2022 2340 by Fayrene Lesches, RN)  Absence of cardiac dysrhythmias or at baseline: Monitor cardiac rate and rhythm     Problem: Gastrointestinal - Adult  Goal: Minimal or absence of nausea and vomiting  Outcome: Progressing  Flowsheets (Taken 10/24/2022 2340 by Fayrene Lesches, RN)  Minimal or absence of nausea and vomiting: Administer IV fluids as ordered to ensure adequate hydration  Goal: Maintains or returns to baseline bowel function  Outcome: Progressing  Flowsheets (Taken 10/24/2022 2340 by Fayrene Lesches, RN)  Maintains or returns to baseline bowel function:   Assess bowel function   Encourage oral fluids to ensure adequate hydration  Goal: Maintains adequate nutritional intake  Outcome: Progressing  Flowsheets (Taken 10/24/2022 2340 by Fayrene Lesches, RN)  Maintains adequate nutritional intake: Monitor intake and output, weight and lab values  Goal: Establish and maintain optimal ostomy function  Outcome: Progressing     Problem: Metabolic/Fluid and Electrolytes - Adult  Goal: Electrolytes maintained within normal limits  Outcome: Progressing  Goal: Hemodynamic stability and optimal renal function maintained  Outcome: Progressing  Goal: Glucose maintained within prescribed range  Outcome: Progressing     Problem: Chronic Conditions and Co-morbidities  Goal: Patient's chronic conditions and co-morbidity symptoms are monitored and maintained or improved  Outcome: Progressing     Problem: Pain  Goal: Verbalizes/displays adequate comfort level or baseline comfort level  Outcome: Progressing

## 2022-10-25 NOTE — PROGRESS NOTES
Physician Progress Note      Kerry Hernández  [facility] #:                  [id number]  :                       2000  ADMIT DATE:       10/23/2022 10:11 PM  [address] [place] DATE:  RESPONDING  PROVIDER #:        Cameron Pugh MD          QUERY TEXT:    Patient admitted with diabetic ketoacidosis (DKA)  and  likely wisdom tooth   infection. Noted to have admission WBC 19.8, HR 99 -146 first 12 hrs following admission,   and PRUDENCE with admission creatinine 1.51    If possible, please document in progress notes and discharge summary if you   are evaluating and/or treating any of the following: The medical record reflects the following:  Risk Factors: DM type 1  Clinical Indicators: admission WBC 19.8, HR 99 -146 first 12 hrs following   admission,  first several respiratory rate readings 22-33;   PRUDENCE - admission   creatinine 1.51  Treatment: IV NS boluses in ED,  IV Clindamycin -> clindamycin capsules    Violetta Groves, MSN, RN, CCDS, [facility]  Clinical   [phone]  . Options provided:  -- SIRS of non-infectious origin due to DKA without acute organ dysfunction   present on admission  -- SIRS of non-infectious origin due to DKA with acute organ dysfunction   present on admission  -- Sepsis present on admission, due to wisdom tooth infection  -- Other - I will add my own diagnosis  -- Disagree - Not applicable / Not valid  -- Disagree - Clinically unable to determine / Unknown  -- Refer to Clinical Documentation Reviewer    PROVIDER RESPONSE TEXT:    This patient has SIRS of non-infectious origin due to DKA without acute organ   dysfunction present on admission.     Query created by: Tam Trevizo on 10/25/2022 4:37 PM      Electronically signed by:  Cameron Pugh MD 10/25/2022 6:27 PM

## 2022-10-26 ENCOUNTER — TELEPHONE (OUTPATIENT)
Dept: FAMILY MEDICINE CLINIC | Age: 22
End: 2022-10-26

## 2022-10-26 VITALS
RESPIRATION RATE: 16 BRPM | OXYGEN SATURATION: 99 % | TEMPERATURE: 98.1 F | SYSTOLIC BLOOD PRESSURE: 127 MMHG | HEART RATE: 85 BPM | DIASTOLIC BLOOD PRESSURE: 89 MMHG | WEIGHT: 176 LBS | BODY MASS INDEX: 29.11 KG/M2

## 2022-10-26 LAB
ABSOLUTE EOS #: <0.03 K/UL (ref 0–0.44)
ABSOLUTE IMMATURE GRANULOCYTE: <0.03 K/UL (ref 0–0.3)
ABSOLUTE LYMPH #: 2.17 K/UL (ref 1.1–3.7)
ABSOLUTE MONO #: 0.57 K/UL (ref 0.1–1.4)
ANION GAP SERPL CALCULATED.3IONS-SCNC: 11 MMOL/L (ref 9–17)
BASOPHILS # BLD: 1 % (ref 0–2)
BASOPHILS ABSOLUTE: 0.03 K/UL (ref 0–0.2)
BUN BLDV-MCNC: 4 MG/DL (ref 6–20)
BUN/CREAT BLD: 8 (ref 9–20)
CALCIUM SERPL-MCNC: 8.5 MG/DL (ref 8.6–10.4)
CHLORIDE BLD-SCNC: 112 MMOL/L (ref 98–107)
CO2: 19 MMOL/L (ref 20–31)
CREAT SERPL-MCNC: 0.51 MG/DL (ref 0.5–0.9)
EOSINOPHILS RELATIVE PERCENT: 0 % (ref 1–4)
GFR SERPL CREATININE-BSD FRML MDRD: >60 ML/MIN/1.73M2
GLUCOSE BLD-MCNC: 120 MG/DL (ref 65–105)
GLUCOSE BLD-MCNC: 139 MG/DL (ref 65–105)
GLUCOSE BLD-MCNC: 139 MG/DL (ref 65–105)
GLUCOSE BLD-MCNC: 146 MG/DL (ref 65–105)
GLUCOSE BLD-MCNC: 147 MG/DL (ref 65–105)
GLUCOSE BLD-MCNC: 156 MG/DL (ref 65–105)
GLUCOSE BLD-MCNC: 169 MG/DL (ref 65–105)
GLUCOSE BLD-MCNC: 174 MG/DL (ref 65–105)
GLUCOSE BLD-MCNC: 175 MG/DL (ref 65–105)
GLUCOSE BLD-MCNC: 183 MG/DL (ref 70–99)
GLUCOSE BLD-MCNC: 191 MG/DL (ref 65–105)
GLUCOSE BLD-MCNC: 282 MG/DL (ref 65–105)
GLUCOSE BLD-MCNC: 315 MG/DL (ref 65–105)
GLUCOSE BLD-MCNC: 79 MG/DL (ref 65–105)
HCT VFR BLD CALC: 31.8 % (ref 36.3–47.1)
HEMOGLOBIN: 10.9 G/DL (ref 11.9–15.1)
IMMATURE GRANULOCYTES: 0 %
LYMPHOCYTES # BLD: 41 % (ref 25–45)
MCH RBC QN AUTO: 27.2 PG (ref 25.2–33.5)
MCHC RBC AUTO-ENTMCNC: 34.3 G/DL (ref 25.2–33.5)
MCV RBC AUTO: 79.3 FL (ref 82.6–102.9)
MONOCYTES # BLD: 11 % (ref 2–8)
NRBC AUTOMATED: 0 PER 100 WBC
PDW BLD-RTO: 13.4 % (ref 11.8–14.4)
PHOSPHORUS: 2.3 MG/DL (ref 2.6–4.5)
PLATELET # BLD: 347 K/UL (ref 138–453)
PMV BLD AUTO: 10.1 FL (ref 8.1–13.5)
POTASSIUM SERPL-SCNC: 3.5 MMOL/L (ref 3.7–5.3)
POTASSIUM SERPL-SCNC: 3.9 MMOL/L (ref 3.7–5.3)
RBC # BLD: 4.01 M/UL (ref 3.95–5.11)
RBC # BLD: ABNORMAL 10*6/UL
SEG NEUTROPHILS: 47 % (ref 34–64)
SEGMENTED NEUTROPHILS ABSOLUTE COUNT: 2.46 K/UL (ref 1.5–8.1)
SODIUM BLD-SCNC: 142 MMOL/L (ref 135–144)
WBC # BLD: 5.3 K/UL (ref 4.5–13.5)

## 2022-10-26 PROCEDURE — 84100 ASSAY OF PHOSPHORUS: CPT

## 2022-10-26 PROCEDURE — 36415 COLL VENOUS BLD VENIPUNCTURE: CPT

## 2022-10-26 PROCEDURE — 6370000000 HC RX 637 (ALT 250 FOR IP)

## 2022-10-26 PROCEDURE — 99238 HOSP IP/OBS DSCHRG MGMT 30/<: CPT | Performed by: INTERNAL MEDICINE

## 2022-10-26 PROCEDURE — 82947 ASSAY GLUCOSE BLOOD QUANT: CPT

## 2022-10-26 PROCEDURE — 2580000003 HC RX 258

## 2022-10-26 PROCEDURE — 84132 ASSAY OF SERUM POTASSIUM: CPT

## 2022-10-26 PROCEDURE — 80048 BASIC METABOLIC PNL TOTAL CA: CPT

## 2022-10-26 PROCEDURE — 6370000000 HC RX 637 (ALT 250 FOR IP): Performed by: INTERNAL MEDICINE

## 2022-10-26 PROCEDURE — 6360000002 HC RX W HCPCS

## 2022-10-26 PROCEDURE — 99231 SBSQ HOSP IP/OBS SF/LOW 25: CPT | Performed by: NURSE PRACTITIONER

## 2022-10-26 PROCEDURE — 85025 COMPLETE CBC W/AUTO DIFF WBC: CPT

## 2022-10-26 RX ORDER — INSULIN LISPRO 100 [IU]/ML
12 INJECTION, SOLUTION INTRAVENOUS; SUBCUTANEOUS
Qty: 10 ADJUSTABLE DOSE PRE-FILLED PEN SYRINGE | Refills: 0 | Status: SHIPPED | OUTPATIENT
Start: 2022-10-26 | End: 2022-11-28 | Stop reason: SDUPTHER

## 2022-10-26 RX ORDER — INSULIN GLARGINE 100 [IU]/ML
50 INJECTION, SOLUTION SUBCUTANEOUS
Status: DISCONTINUED | OUTPATIENT
Start: 2022-10-27 | End: 2022-10-26 | Stop reason: HOSPADM

## 2022-10-26 RX ORDER — PANTOPRAZOLE SODIUM 40 MG/1
40 TABLET, DELAYED RELEASE ORAL
Qty: 30 TABLET | Refills: 0 | Status: SHIPPED | OUTPATIENT
Start: 2022-10-27

## 2022-10-26 RX ORDER — CLINDAMYCIN HYDROCHLORIDE 150 MG/1
450 CAPSULE ORAL EVERY 8 HOURS SCHEDULED
Qty: 36 CAPSULE | Refills: 0 | Status: SHIPPED | OUTPATIENT
Start: 2022-10-26 | End: 2022-10-30

## 2022-10-26 RX ORDER — INSULIN DEGLUDEC 200 U/ML
50 INJECTION, SOLUTION SUBCUTANEOUS DAILY
Qty: 7 ADJUSTABLE DOSE PRE-FILLED PEN SYRINGE | Refills: 0 | Status: SHIPPED | OUTPATIENT
Start: 2022-10-26

## 2022-10-26 RX ORDER — GREEN TEA/HOODIA GORDONII 315-12.5MG
1 CAPSULE ORAL 3 TIMES DAILY
Qty: 30 TABLET | Refills: 0 | COMMUNITY
Start: 2022-10-26 | End: 2022-11-05

## 2022-10-26 RX ORDER — INSULIN LISPRO 100 [IU]/ML
12 INJECTION, SOLUTION INTRAVENOUS; SUBCUTANEOUS
Status: DISCONTINUED | OUTPATIENT
Start: 2022-10-26 | End: 2022-10-26 | Stop reason: HOSPADM

## 2022-10-26 RX ADMIN — INSULIN LISPRO 10 UNITS: 100 INJECTION, SOLUTION INTRAVENOUS; SUBCUTANEOUS at 09:10

## 2022-10-26 RX ADMIN — PANTOPRAZOLE SODIUM 40 MG: 40 TABLET, DELAYED RELEASE ORAL at 05:54

## 2022-10-26 RX ADMIN — INSULIN LISPRO 12 UNITS: 100 INJECTION, SOLUTION INTRAVENOUS; SUBCUTANEOUS at 11:56

## 2022-10-26 RX ADMIN — ACETAMINOPHEN AND CODEINE PHOSPHATE 0.35 MG: 120; 12 SOLUTION ORAL at 09:11

## 2022-10-26 RX ADMIN — CHOLECALCIFEROL TAB 25 MCG (1000 UNIT) 1000 UNITS: 25 TAB at 09:09

## 2022-10-26 RX ADMIN — ENOXAPARIN SODIUM 40 MG: 100 INJECTION SUBCUTANEOUS at 09:10

## 2022-10-26 RX ADMIN — LEVOTHYROXINE SODIUM 112 MCG: 0.11 TABLET ORAL at 09:09

## 2022-10-26 RX ADMIN — CLINDAMYCIN HYDROCHLORIDE 450 MG: 150 CAPSULE ORAL at 05:52

## 2022-10-26 RX ADMIN — INSULIN GLARGINE 40 UNITS: 100 INJECTION, SOLUTION SUBCUTANEOUS at 09:11

## 2022-10-26 RX ADMIN — CETIRIZINE HYDROCHLORIDE 10 MG: 5 TABLET, FILM COATED ORAL at 09:09

## 2022-10-26 RX ADMIN — DEXTROSE AND SODIUM CHLORIDE: 5; 450 INJECTION, SOLUTION INTRAVENOUS at 06:21

## 2022-10-26 ASSESSMENT — PAIN SCALES - GENERAL: PAINLEVEL_OUTOF10: 0

## 2022-10-26 ASSESSMENT — ENCOUNTER SYMPTOMS
DIARRHEA: 0
ABDOMINAL PAIN: 0
SHORTNESS OF BREATH: 0
RESPIRATORY NEGATIVE: 1

## 2022-10-26 NOTE — PROGRESS NOTES
Hospitalist Progress Note    Patient:  Edison Santiago     YOB: 2000    MRN: [id number]   Admit date: 10/23/2022     Acct: [de-identified]     PCP: Doreen Roland,     CC--Interval History:     DKA resolved---diet advanced and tolerated----[facility]  [facility]-----\"log\"  12-12-12    Dental pain right upper molar infection--needs to see dentist for definitive care---on Clindamycin----no breast feeding    K = 3.5 --> potassium replacement    Celiac disease---should have gluten FREE diet    Home----10.26.2022    See note below     All other ROS negative except noted in HPI    Diet:  ADULT DIET; Regular; 4 carb choices (60 gm/meal);  Low Fat/Low Chol/High Fiber/2 gm Na; Gluten Free    Medications:  Scheduled Meds:   [START ON 10/27/2022] insulin glargine  50 Units SubCUTAneous Daily with breakfast    insulin lispro  12 Units SubCUTAneous TID WC    pantoprazole  40 mg Oral QAM AC    clindamycin  450 mg Oral 3 times per day    cetirizine  10 mg Oral Daily    [Held by provider] Insulin Degludec  40 Units SubCUTAneous Daily    levothyroxine  112 mcg Oral Daily    norethindrone  1 tablet Oral Daily    Vitamin D  1,000 Units Oral Daily    enoxaparin  40 mg SubCUTAneous Daily     Continuous Infusions:   sodium chloride Stopped (10/25/22 0030)    dextrose 5 % and 0.45 % NaCl 150 mL/hr at 10/26/22 0621    dextrose       PRN Meds:potassium chloride **OR** potassium alternative oral replacement **OR** potassium chloride, benzonatate, promethazine-dextromethorphan, dextrose bolus **OR** dextrose bolus, magnesium sulfate, sodium phosphate IVPB **OR** sodium phosphate IVPB **OR** sodium phosphate IVPB, polyethylene glycol, dextrose 5 % and 0.45 % NaCl, glucose, dextrose bolus **OR** dextrose bolus, glucagon (rDNA), dextrose, morphine **OR** morphine, traMADol **OR** traMADol, prochlorperazine    Objective:  Labs:  CBC with Differential:    Lab Results   Component Value Date/Time    WBC 5.3 10/26/2022 04:53 AM    RBC 4.01 10/26/2022 04:53 AM    HGB 10.9 10/26/2022 04:53 AM    HCT 31.8 10/26/2022 04:53 AM     10/26/2022 04:53 AM    MCV 79.3 10/26/2022 04:53 AM    MCH 27.2 10/26/2022 04:53 AM    MCHC 34.3 10/26/2022 04:53 AM    RDW 13.4 10/26/2022 04:53 AM    LYMPHOPCT 41 10/26/2022 04:53 AM    MONOPCT 11 10/26/2022 04:53 AM    BASOPCT 1 10/26/2022 04:53 AM    MONOSABS 0.57 10/26/2022 04:53 AM    LYMPHSABS 2.17 10/26/2022 04:53 AM    EOSABS <0.03 10/26/2022 04:53 AM    BASOSABS 0.03 10/26/2022 04:53 AM    DIFFTYPE NOT REPORTED 06/30/2021 11:26 AM     BMP:    Lab Results   Component Value Date/Time     10/26/2022 04:53 AM    K 3.9 10/26/2022 11:52 AM     10/26/2022 04:53 AM    CO2 19 10/26/2022 04:53 AM    BUN 4 10/26/2022 04:53 AM    LABALBU 4.5 06/30/2021 11:26 AM    CREATININE 0.51 10/26/2022 04:53 AM    CALCIUM 8.5 10/26/2022 04:53 AM    GFRAA >60 04/28/2022 11:19 AM    LABGLOM >60 10/26/2022 04:53 AM    GLUCOSE 183 10/26/2022 04:53 AM    GLUCOSE 187 04/12/2012 07:30 AM           Physical Exam:  Vitals: /89   Pulse 85   Temp 98.1 °F (36.7 °C) (Oral)   Resp 16   Wt 176 lb (79.8 kg)   LMP 10/17/2022   SpO2 99%   BMI 29.11 kg/m²   24 hour intake/output:  Intake/Output Summary (Last 24 hours) at 10/26/2022 1238  Last data filed at 10/26/2022 0850  Gross per 24 hour   Intake 4041.09 ml   Output --   Net 4041.09 ml     Last 3 weights: Wt Readings from Last 3 Encounters:   10/26/22 176 lb (79.8 kg)   10/21/22 175 lb 3.2 oz (79.5 kg)   09/12/22 180 lb (81.6 kg)     HEENT: Normocephalic and Atraumatic  Neck: Supple, No Masses, Tenderness, Nodularity, and No Lymphadenopathy  Chest/Lungs: Clear to Auscultation without Rales, Rhonchi, or Wheezes  Cardiac: Regular Rate and Rhythm  GI/Abdomen:  Bowel Sounds Present and Soft, Non-tender, without Guarding or Rebound Tenderness  : Not examined  EXT/Skin: No Edema, No Cyanosis, and No Clubbing  Neuro: Alert and Oriented, to Person, to Time, to Place, to Situation, and No Localizing Signs/Symptoms      Assessment:    Principal Problem:    Diabetic ketoacidosis without coma associated with type 1 diabetes mellitus ([facility] [address]  Active Problems:    Acute kidney injury superimposed on CKD ([facility] [address]  Resolved Problems:    * No resolved hospital problems. Klaus Pretty   M  22  WF  Nila Gray, FP;  Vincent Rosado NP--DE]  FULL CODE       INSULIN gtt---10.24.2022 + LANTUS--LOG  COVID-19--NEGATIVE    Anti-infectives:    Clindamycin IV    DKA---diabetic ketoacidosis--10.24.2022             Seizure--history related to DKA  Diabetes Mellitus Type 1---uncontrolled--10.24.2022  PRUDENCE superposed on CKD---now at Stage 2 with hydration---10.24.2022  Dental pain--likely wisdom tooth infection---POA    Celiac disease  Chronic lymphocytic thyroiditis--now hypothyroidism   Obesity   Marijuana use--quit  2nd hand tobacco smoke exposure  PMH:    insomnia, migraine headaches, precocious adrenarche--adrenal stimulation,                precocious puberty, seasonal allergies  PSH:    Supprelin implant for precocious puberty-----removal--, tonsillectomy--,               EGD--, --emergency---2022    Allergies:  oxycodone--Percocet, GLUTEN, insulin Detemir, codeine           Plan:   Home--10.26.2022   Medications reviewed   \"Lantus\"  50----log 12   Dental infection---Clindamycin--no breast feeding   GERD---Protonix   Probiotics   Vincent Rosado NP---diabetic educator   Follow up Dr. Radha Pedraza, [facility]   Patient needs to log blood glucose levels AC and 2hr post evening meal and report to diabetic educator---patient to                  have working ambulatory monitor  10.   See orders

## 2022-10-26 NOTE — PROGRESS NOTES
Discharge teaching and instructions for diagnosis of DKA completed with patient using teachback method. AVS reviewed with the patient. Prescriptions were sent to [address] [place] [address] for the patient to  after discharge. Patient voiced understanding regarding prescriptions. RN sent a order to Dr. Christin Robins office to call the patient to make a follow up appointment. This was discussed with the patient. Unit phone number highlighted on AVS if questions arise.

## 2022-10-26 NOTE — DISCHARGE INSTR - DIET

## 2022-10-26 NOTE — CONSULTS
Diabetic Education Consult Note    Patient:  Rick Lisa     YOB: 2000    MRN: [id number]   Admit date: 10/23/2022     Acct: [de-identified]     PCP: Mis Avery DO    CC--Interval History: she was admitted with DKA and infections on 10/23/22. She is ready to be discharged today with an increase in her insulin. She has been without cgm supplies for a few days and when she called [facility] [facility] she could not get and  in touch with  anyone and was supposed to get her supplies delivered on 10/15/22  Review of Systems   Constitutional:  Positive for fatigue. Negative for unexpected weight change. Eyes:  Negative for visual disturbance. Respiratory: Negative. Negative for shortness of breath. Cardiovascular:  Negative for chest pain and leg swelling. Gastrointestinal:  Negative for abdominal pain and diarrhea. Endocrine: Negative for polydipsia, polyphagia and polyuria. Genitourinary: Negative. Musculoskeletal: Negative. Skin:  Negative for rash and wound. Neurological:  Negative for dizziness, tremors, seizures and headaches. Psychiatric/Behavioral: Negative. Negative for confusion and decreased concentration. All other ROS negative except noted in HPI    Diet:  ADULT DIET; Regular; 4 carb choices (60 gm/meal);  Low Fat/Low Chol/High Fiber/2 gm Na; Gluten Free    Medications:  Scheduled Meds:   [START ON 10/27/2022] insulin glargine  50 Units SubCUTAneous Daily with breakfast    insulin lispro  12 Units SubCUTAneous TID WC    pantoprazole  40 mg Oral QAM AC    clindamycin  450 mg Oral 3 times per day    cetirizine  10 mg Oral Daily    [Held by provider] Insulin Degludec  40 Units SubCUTAneous Daily    levothyroxine  112 mcg Oral Daily    norethindrone  1 tablet Oral Daily    Vitamin D  1,000 Units Oral Daily    enoxaparin  40 mg SubCUTAneous Daily     Continuous Infusions:   sodium chloride Stopped (10/25/22 0030)    dextrose 5 % and 0.45 % NaCl Stopped (10/26/22 1302)    dextrose       PRN Meds:potassium chloride **OR** potassium alternative oral replacement **OR** potassium chloride, benzonatate, promethazine-dextromethorphan, dextrose bolus **OR** dextrose bolus, magnesium sulfate, sodium phosphate IVPB **OR** sodium phosphate IVPB **OR** sodium phosphate IVPB, polyethylene glycol, dextrose 5 % and 0.45 % NaCl, glucose, dextrose bolus **OR** dextrose bolus, glucagon (rDNA), dextrose, morphine **OR** morphine, traMADol **OR** traMADol, prochlorperazine    Objective:  Labs:  BMP:    Lab Results   Component Value Date/Time     10/26/2022 04:53 AM    K 3.9 10/26/2022 11:52 AM     10/26/2022 04:53 AM    CO2 19 10/26/2022 04:53 AM    BUN 4 10/26/2022 04:53 AM    LABALBU 4.5 06/30/2021 11:26 AM    CREATININE 0.51 10/26/2022 04:53 AM    CALCIUM 8.5 10/26/2022 04:53 AM    GFRAA >60 04/28/2022 11:19 AM    LABGLOM >60 10/26/2022 04:53 AM    GLUCOSE 183 10/26/2022 04:53 AM    GLUCOSE 187 04/12/2012 07:30 AM     HgBA1c:    Lab Results   Component Value Date/Time    LABA1C 9.0 10/24/2022 06:23 AM     Microalbumen/Creatinine ratio:  No components found for: RUCREAT   Ref Range & Units 10/25/22 0529 10/23/22 2340   Beta-Hydroxybutyrate 0.02 - 0.27 mmol/L 0.51 High   8.49 High     Resulting Agency  [facility]       Hemoglobin A1C   Date Value Ref Range Status   10/24/2022 9.0 (H) 4.0 - 6.0 % Final   08/09/2022 10.2 (H) 4.0 - 6.0 % Final         Physical Exam:  Vitals: /89   Pulse 85   Temp 98.1 °F (36.7 °C) (Oral)   Resp 16   Wt 176 lb (79.8 kg)   LMP 10/17/2022   SpO2 99%   BMI 29.11 kg/m²   24 hour intake/output:  Intake/Output Summary (Last 24 hours) at 10/26/2022 1326  Last data filed at 10/26/2022 0850  Gross per 24 hour   Intake 4041.09 ml   Output --   Net 4041.09 ml     Last 3 weights:   Wt Readings from Last 3 Encounters:   10/26/22 176 lb (79.8 kg)   10/21/22 175 lb 3.2 oz (79.5 kg)   09/12/22 180 lb (81.6 kg)       Physical Exam  Constitutional:       Appearance: She is well-developed. Eyes:      Pupils: Pupils are equal, round, and reactive to light. Neck:      Thyroid: No thyroid mass, thyromegaly or thyroid tenderness. Cardiovascular:      Rate and Rhythm: Normal rate and regular rhythm. Heart sounds: Normal heart sounds. Pulmonary:      Effort: Pulmonary effort is normal.      Breath sounds: Normal breath sounds. Abdominal:      General: Bowel sounds are normal.      Palpations: Abdomen is soft. Skin:     General: Skin is warm and dry. Comments: Negative for open/nonhealing wounds. Negative for lipohypertrophy. Neurological:      Mental Status: She is alert and oriented to person, place, and time. Assessment:    Principal Problem:    Diabetic ketoacidosis without coma associated with type 1 diabetes mellitus ([facility] [address]  Active Problems:    Acute kidney injury superimposed on CKD ([facility] [address]  Resolved Problems:    * No resolved hospital problems. *    [unfilled]  Orders Placed This Encounter   Procedures    COVID-19, Rapid    XR CHEST PORTABLE    CBC with Auto Differential    Pregnancy, Urine    Urinalysis with Reflex to Culture    SPECIMEN REJECTION    Basic Metabolic Panel    Beta-Hydroxybutyrate    CBC with Auto Differential    Magnesium    TSH WITH REFLEX    Hemoglobin A1C    Microscopic Urinalysis    CBC with Auto Differential    SPECIMEN REJECTION    Basic Metabolic Panel    Magnesium    Phosphorus    Beta-Hydroxybutyrate    CBC with Auto Differential    Phosphorus    Basic Metabolic Panel    Potassium    Yazmin - Tonya Olvera NP, Diabetic Education, [facility], [place], , Family Medicine, [place]    ADULT DIET; Regular; 4 carb choices (60 gm/meal);  Low Fat/Low Chol/High Fiber/2 gm Na; Gluten Free    Vital signs per unit routine    Notify physician if blood glucose (BG) less than (see details below)    Notify physician if blood glucose (BG) less than 200 mg/dL AND two of the following three criteria are met on two consecutive BMPs    Daily weights    Intake and output    Nurse to change IV Fluid when blood glucose (BG) reaches 250 mg/dL    Place intermittent pneumatic compression device    Telemetry monitoring - 72 hour duration    Up as tolerated    Monitor for signs/symptoms of urinary retention    HYPOGLYCEMIA TREATMENT: blood glucose LESS THAN 70 mg/dL and patient ALERT and TOLERATING PO    HYPOGLYCEMIA TREATMENT: blood glucose LESS THAN 70 mg/dL and patient NOT ALERT or NPO    Full Code    Inpatient consult to Diabetes Educator/Management Team    Initiate Oxygen Therapy Protocol    POC Glucose Fingerstick    POC Glucose Fingerstick    POC Glucose Fingerstick    POC Glucose Fingerstick    POCT Glucose - every hour    POC Glucose Fingerstick    POCT Glucose    POC Glucose Fingerstick    POC Glucose Fingerstick    POC Glucose Fingerstick    POC Glucose Fingerstick    POC Glucose Fingerstick    POC Glucose Fingerstick    POC Glucose Fingerstick    POC Glucose Fingerstick    POC Glucose Fingerstick    POC Glucose Fingerstick    POC Glucose Fingerstick    POC Glucose Fingerstick    POC Glucose Fingerstick    POC Glucose Fingerstick    POC Glucose Fingerstick    POC Glucose Fingerstick    POC Glucose Fingerstick    POC Glucose Fingerstick    POC Glucose Fingerstick    POC Glucose Fingerstick    POC Glucose Fingerstick    POC Glucose Fingerstick    POC Glucose Fingerstick    POC Glucose Fingerstick    POC Glucose Fingerstick    POC Glucose Fingerstick    POC Glucose Fingerstick    POC Glucose Fingerstick    POC Glucose Fingerstick    POC Glucose Fingerstick    POC Glucose Fingerstick    POC Glucose Fingerstick    POC Glucose Fingerstick    POC Glucose Fingerstick    POC Glucose Fingerstick    POC Glucose Fingerstick    POC Glucose Fingerstick    POC Glucose Fingerstick    POC Glucose Fingerstick    POC Glucose Fingerstick    POC Glucose Fingerstick    POC Glucose Fingerstick    POC Glucose Fingerstick    POC Glucose Fingerstick    POC Glucose Fingerstick    POC Glucose Fingerstick    POC Glucose Fingerstick    POC Glucose Fingerstick    POC Glucose Fingerstick    POC Glucose Fingerstick    POC Glucose Fingerstick    POC Glucose Fingerstick    POC Glucose Fingerstick    POC Glucose Fingerstick    POC Glucose Fingerstick    POC Glucose Fingerstick    POC Glucose Fingerstick    POC Glucose Fingerstick    POC Glucose Fingerstick    ADMIT TO INPATIENT    Discharge patient     Orders Placed This Encounter   Medications    0.9 % sodium chloride bolus    ondansetron (ZOFRAN) injection 4 mg    insulin regular (HUMULIN R;NOVOLIN R) injection 20 Units    DISCONTD: pantoprazole (PROTONIX) 40 mg in sodium chloride (PF) 10 mL injection    pantoprazole (PROTONIX) 40 MG injection     Haravery Parmar: cabinet override    ondansetron (ZOFRAN) injection 4 mg    0.9 % sodium chloride bolus    DISCONTD: insulin (HumuLIN R) 100 units in sodium chloride 0.9% 100 mL infusion    insulin regular (HumuLIN R;NovoLIN R) infusion     Bibi Terrell: cabinet override    benzonatate (TESSALON) capsule 100 mg    cetirizine (ZYRTEC) tablet 10 mg    Insulin Degludec SOPN 40 Units    levothyroxine (SYNTHROID) tablet 112 mcg    norethindrone (MICRONOR) tablet 0.35 mg (Patient Supplied)    promethazine-dextromethorphan (PROMETHAZINE-DM) 6.25-15 MG/5ML syrup 5 mL (Patient Supplied)    vitamin D (CHOLECALCIFEROL) tablet 1,000 Units    OR Linked Order Group     dextrose bolus 10% 125 mL     dextrose bolus 10% 250 mL    DISCONTD: potassium chloride 10 mEq/100 mL IVPB (Peripheral Line)    magnesium sulfate 1000 mg in dextrose 5% 100 mL IVPB    OR Linked Order Group     sodium phosphate 10 mmol in sodium chloride 0.9 % 250 mL IVPB     sodium phosphate 15 mmol in dextrose 5 % 250 mL IVPB     sodium phosphate 20 mmol in dextrose 5 % 500 mL IVPB    polyethylene glycol (GLYCOLAX) packet 17 g    enoxaparin (LOVENOX) injection 40 mg     Order Specific Question:   Indication of Use     Answer:   Prophylaxis-DVT/PE    0.45 % sodium chloride infusion    dextrose 5 % and 0.45 % sodium chloride infusion    DISCONTD: insulin regular (HUMULIN R;NOVOLIN R) 100 Units in sodium chloride 0.9 % 100 mL infusion    glucose chewable tablet 16 g    OR Linked Order Group     dextrose bolus 10% 125 mL     dextrose bolus 10% 250 mL    glucagon (rDNA) injection 1 mg    dextrose 10 % infusion    OR Linked Order Group     morphine (PF) injection 1 mg     morphine (PF) injection 2 mg    OR Linked Order Group     traMADol (ULTRAM) tablet 50 mg     traMADol (ULTRAM) tablet 100 mg    prochlorperazine (COMPAZINE) injection 10 mg    DISCONTD: clindamycin (CLEOCIN) 600 mg in dextrose 5 % 50 mL IVPB     Order Specific Question:   Antimicrobial Indications     Answer:    Other     Order Specific Question:   Other Abx Indication     Answer:   tooth infection    potassium chloride (KLOR-CON M) extended release tablet 60 mEq    potassium chloride (KLOR-CON M) 20 MEQ extended release tablet     CECI WYATT: cabinet override    DISCONTD: insulin glargine (LANTUS) injection vial 40 Units    DISCONTD: insulin lispro (HUMALOG) injection vial 10 Units    pantoprazole (PROTONIX) tablet 40 mg    DISCONTD: clindamycin (CLEOCIN) 600 mg in dextrose 5 % 50 mL IVPB     Order Specific Question:   Antimicrobial Indications     Answer:   Urinary Tract Infection     Order Specific Question:   UTI duration of therapy     Answer:   7 days    DISCONTD: clindamycin (CLEOCIN) capsule 600 mg     Order Specific Question:   Antimicrobial Indications     Answer:   Urinary Tract Infection     Order Specific Question:   UTI duration of therapy     Answer:   7 days    insulin glargine (LANTUS) 100 UNIT/ML injection vial     Laith Ignacio: cabinet override    pantoprazole (PROTONIX) 40 MG tablet     Elliot Jeronimo: cabinet override    clindamycin (CLEOCIN) capsule 450 mg     Order Specific Question:   Antimicrobial Indications     Answer: Other     Order Specific Question:   Other Abx Indication     Answer:   Dental infection    OR Linked Order Group     potassium chloride (KLOR-CON M) extended release tablet 40 mEq     potassium bicarb-citric acid (EFFER-K) effervescent tablet 40 mEq     potassium chloride 10 mEq/100 mL IVPB (Peripheral Line)    insulin glargine (LANTUS) injection vial 50 Units    insulin lispro (HUMALOG) injection vial 12 Units    Insulin Degludec (TRESIBA FLEXTOUCH) 200 UNIT/ML SOPN     Sig: Inject 50 Units into the skin daily     Dispense:  7 Adjustable Dose Pre-filled Pen Syringe     Refill:  0    insulin lispro, 1 Unit Dial, (HUMALOG/ADMELOG) 100 UNIT/ML SOPN     Sig: Inject 12 Units into the skin 3 times daily (before meals)     Dispense:  10 Adjustable Dose Pre-filled Pen Syringe     Refill:  0    clindamycin (CLEOCIN) 150 MG capsule     Sig: Take 3 capsules by mouth every 8 hours for 4 days     Dispense:  36 capsule     Refill:  0    Probiotic Acidophilus (FLORANEX) TABS     Sig: Take 1 tablet by mouth 3 times daily for 10 days     Dispense:  30 tablet     Refill:  0    pantoprazole (PROTONIX) 40 MG tablet     Sig: Take 1 tablet by mouth every morning (before breakfast)     Dispense:  30 tablet     Refill:  0           Plan:  Initial inpatient diabetic education completed. We discussed diabetes as a disease, progression, and management. We discussed the importance of lifestyle and medication management to improve diabetes and reduce risk for complications associated with being uncontrolled. I encouraged patient to follow outpatient for diabetic education. Will check on dexcom supplies  Keep follow up appt. Patient was seen with total face to face time of 30 minutes. More than 50%  of this visit was counseling and education regarding her diabetes.     Electronically signed by MARIA D Montgomery CNP on 10/26/2022 at 1:26 PM

## 2022-10-26 NOTE — TELEPHONE ENCOUNTER
Patient d/c from [facility] 10/26/22 for ketoacidosis. Needs hospital follow up.

## 2022-10-26 NOTE — FLOWSHEET NOTE
Received report from night shift nurse. Introduced myself to patient. Pt denies any needs at this time. Pt is resting in bed with unlabored breathing.        Dasha Land, student nurse   [facility] [facility]

## 2022-10-26 NOTE — FLOWSHEET NOTE
Reported off to day shift nurse, Trevor Botello. Patient is sitting in chair with unlabored breathing.     Dru Pascual, student nurse  [facility]

## 2022-10-27 ENCOUNTER — FOLLOWUP TELEPHONE ENCOUNTER (OUTPATIENT)
Dept: INPATIENT UNIT | Age: 22
End: 2022-10-27

## 2022-10-27 NOTE — DISCHARGE SUMMARY
Per 9                 [address] [place] [address], 00 [facility]                               DISCHARGE SUMMARY    PATIENT NAME: Morgan Telles                    :        2000  MED REC NO:   [id number]                             ROOM:       0216  ACCOUNT NO:   [de-identified]                           ADMIT DATE: 10/23/2022  PROVIDER:     Lucero Soler. Allyson Esparza MD                  DISCHARGE DATE:  10/26/2022    ATTENDING PHYSICIAN OF HOSPITALIZATION:  Pieter Garay MD    PERSONAL PHYSICIAN:  Eduardo Uriarte DO, [facility], [facility]. The patient seen by Ramez Aden, nurse practitioner, diabetic  educator, [address]:  1. DKA (diabetic ketoacidosis), 10/24/2022.  2.  Diabetes mellitus type 1, uncontrolled, 10/24/2022.  3.  Acute kidney injury superimposed on chronic kidney disease, now at a  stage II level with hydration, 10/24/2022. 4.  Dental pain, likely wisdom tooth infection, POA, on clindamycin this  hospitalization. 5.  Celiac disease. 6.  Chronic lymphocytic thyroiditis, now with hypothyroidism. 7.  Obesity. 8.  Marijuana use, quit. 9.  Secondhand tobacco smoke exposure. Other medical problems set forth in the progress note of 10/26/2022,  incorporated for reference herein. HISTORY OF PRESENT ILLNESS AND HOSPITAL COURSE:  The patient is a  20-year-old white female, patient of Eduardo Uriarte DO, [facility], [facility]; Ramez Aden, nurse practitioner, diabetic  educator. The patient presented with DKA, blood glucose level was well  over 600, was placed on IV fluids, standard protocol for DKA, received  insulin drip, subsequently versus concomitantly received the insulin  drip together with Lantus and log insulins and converted strictly to  injectable insulins on 10/26/2022. The patient discharged to home  10/26/2022.     Initial dehydration with acute kidney injury superimposed on chronic  kidney disease as a stage II level with hydration. dental pain, most likely due to an upper wisdom tooth on the right-hand  side, tenderness and pain into the jaw. The patient was placed on  clindamycin IV during this hospitalization, converted to oral  clindamycin in the outpatient setting until the patient is seen by a  dentist.  No breastfeeding given clindamycin. Underlying celiac  disease, should be on a gluten-free diet, not clear if she follows this  in the outpatient setting. The patient is stable, home 10/26/2022. LABORATORY DATA:  Around the time of discharge, white cell count 5.3,  hemoglobin 10.9, hematocrit 31.8 and platelets 214,130. Sodium 142,  potassium 3.5 with additional potassium given, chloride 112, CO2 of 19,  BUN 4, creatinine 0.51, glucose in a range of 169 to 174. The patient's  insulin was adjusted at today's date, will be on 50 of Lantus together  with log insulin with breakfast, lunch and evening meal, 12, 12, 12. Will need to have further adjustment in the outpatient setting given the  patient's level of activity together with food intake, should change  from that of the hospital setting. Calcium was 8.5, GFR greater than 60, magnesium 1.9, phosphate up to  2.3, phosphate replacement having been given. DISCHARGE INSTRUCTIONS/FOLLOWUP:  Discharged to home on 10/26/2022. DIET:  Diabetic. ACTIVITY:  As tolerated. CONDITION AT DISCHARGE:  Fair, improved. MEDICATIONS:  NEW:  Clindamycin 150 mg capsules, 450 mg every 8 hours for four days to  complete course, the patient to be seen by a dentist before completing  the course of clindamycin; pantoprazole (Protonix) 40 mg p.o. daily;  probiotic acidophilus one 3 times a day.     FOLLOWING MEDICATIONS FOR WHICH CHANGES MAY HAVE OCCURRED:  Insulin  Lantus or Tresiba 50 units subcutaneously daily; lispro insulin 12 units  subcutaneously three times a day before meals, breakfast, lunch and  evening meal.    CONTINUE THE FOLLOWING:  Glucagon 3 mg dose pack powder; benzonatate  (Tessalon) 100 mg p.o. three times a day p.r.n. cough; cetirizine  (Zyrtec) 10 mg p.o. daily; the patient has the Dexcom 6 device for  monitoring her blood glucose levels, needs to check with her diabetic  educator to make sure this is working correctly; glucagon as directed  for extreme hypoglycemia and had been seen by Dr. Ladi Batista in the  outpatient setting previously; levothyroxine (Synthroid) 112 mcg p.o.  daily; norethindrone 0.35 tablet daily; ondansetron (Zofran) 4 mg three  times a day p.r.n. nausea, vomiting; promethazine/dextromethorphan  (Promethazine DM) 5 mL four times a day p.r.n. cough; vitamin D 25 mcg  (1000 units) p.o. daily. Follow up with the patient's personal physician, Concepcion Rivers DO,  [facility], [facility]. Follow up with Vera Gallagher,  nurse practitioner, diabetic educator. Any aspect of the patient's care not discussed in the chart and/or  dictation will be addressed and treated as an outpatient. The patient's medications have been reviewed including, but not limited  to, pre-hospital, hospital and discharge medications. The patient  and/or the patient's personal representatives were specifically advised  the only medications to be taken are those set forth in the discharge  orders and no other medications should be taken. Any prior medications  not on the discharge orders are specifically discontinued.         Barbara Blacmkon MD    D: 10/26/2022 15:43:08       T: 10/26/2022 15:46:14     [email or web address]  Job#: [id number]     Doc#: [id number]    CC:

## 2022-10-27 NOTE — TELEPHONE ENCOUNTER
[facility] Transitions Initial Follow Up Call    Outreach made within 2 business days of discharge: Yes    Patient: Jay Ramos Patient : 2000   MRN: [id number]  Reason for Admission: There are no discharge diagnoses documented for the most recent discharge. Discharge Date: 10/26/22       Spoke with: patient    Discharge department/facility: [facility]    TCM Interactive Patient Contact:  Was patient able to fill all prescriptions: Yes  Was patient instructed to bring all medications to the follow-up visit: Yes  Is patient taking all medications as directed in the discharge summary?  Yes  Does patient understand their discharge instructions: Yes  Does patient have questions or concerns that need addressed prior to 7-14 day follow up office visit: no    Scheduled appointment with PCP within 7-14 days    Follow Up  Future Appointments   Date Time Provider Sergio Carpenter   2022  2:00 PM DO POLINA Marin [facility]   11/10/2022  2:00 PM MARIA D Monteiro - CNP DIABETIC [facility]   2023  9:20 AM DO POLINA Marin [facility]       Holli López RN

## 2022-10-31 DIAGNOSIS — E10.9 TYPE 1 DIABETES MELLITUS WITHOUT COMPLICATION (HCC): ICD-10-CM

## 2022-10-31 RX ORDER — BIOTIN 1 MG
TABLET ORAL
Qty: 200 EACH | Refills: 0 | Status: SHIPPED | OUTPATIENT
Start: 2022-10-31

## 2022-11-01 ENCOUNTER — OFFICE VISIT (OUTPATIENT)
Dept: FAMILY MEDICINE CLINIC | Age: 22
End: 2022-11-01
Payer: COMMERCIAL

## 2022-11-01 VITALS
HEIGHT: 65 IN | DIASTOLIC BLOOD PRESSURE: 62 MMHG | WEIGHT: 177 LBS | SYSTOLIC BLOOD PRESSURE: 104 MMHG | OXYGEN SATURATION: 99 % | RESPIRATION RATE: 16 BRPM | HEART RATE: 94 BPM | BODY MASS INDEX: 29.49 KG/M2

## 2022-11-01 DIAGNOSIS — K04.7 DENTAL INFECTION: ICD-10-CM

## 2022-11-01 DIAGNOSIS — E10.10 DIABETIC KETOACIDOSIS WITHOUT COMA ASSOCIATED WITH TYPE 1 DIABETES MELLITUS (HCC): Primary | ICD-10-CM

## 2022-11-01 DIAGNOSIS — N17.9 ACUTE KIDNEY INJURY SUPERIMPOSED ON CKD (HCC): ICD-10-CM

## 2022-11-01 DIAGNOSIS — Z09 HOSPITAL DISCHARGE FOLLOW-UP: ICD-10-CM

## 2022-11-01 DIAGNOSIS — N18.9 ACUTE KIDNEY INJURY SUPERIMPOSED ON CKD (HCC): ICD-10-CM

## 2022-11-01 PROCEDURE — 1111F DSCHRG MED/CURRENT MED MERGE: CPT | Performed by: FAMILY MEDICINE

## 2022-11-01 PROCEDURE — 99211 OFF/OP EST MAY X REQ PHY/QHP: CPT | Performed by: FAMILY MEDICINE

## 2022-11-01 PROCEDURE — 99214 OFFICE O/P EST MOD 30 MIN: CPT | Performed by: FAMILY MEDICINE

## 2022-11-01 ASSESSMENT — PATIENT HEALTH QUESTIONNAIRE - PHQ9
1. LITTLE INTEREST OR PLEASURE IN DOING THINGS: 0
SUM OF ALL RESPONSES TO PHQ QUESTIONS 1-9: 0
SUM OF ALL RESPONSES TO PHQ9 QUESTIONS 1 & 2: 0
SUM OF ALL RESPONSES TO PHQ QUESTIONS 1-9: 0
2. FEELING DOWN, DEPRESSED OR HOPELESS: 0
SUM OF ALL RESPONSES TO PHQ QUESTIONS 1-9: 0
SUM OF ALL RESPONSES TO PHQ QUESTIONS 1-9: 0

## 2022-11-03 ASSESSMENT — ENCOUNTER SYMPTOMS
VOMITING: 0
SORE THROAT: 0
DIARRHEA: 0
SINUS PRESSURE: 0
CONSTIPATION: 0
RHINORRHEA: 0
NAUSEA: 0
SHORTNESS OF BREATH: 0
COUGH: 0
EYE REDNESS: 0
ABDOMINAL PAIN: 0
TROUBLE SWALLOWING: 0
EYE DISCHARGE: 0
WHEEZING: 0

## 2022-11-03 NOTE — PROGRESS NOTES
Post-Discharge Transitional Care Follow Up      Andre Rodriguez   YOB: 2000    Date of Office Visit:  11/1/2022  Date of Hospital Admission: 10/23/22  Date of Hospital Discharge: 10/26/22  Readmission Risk Score (high >=14%. Medium >=10%):Readmission Risk Score: 12.7      Care management risk score Rising risk (score 2-5) and Complex Care (Scores >=6): No Risk Score On File     Non face to face  following discharge, date last encounter closed (first attempt may have been earlier): 10/26/2022     Call initiated 2 business days of discharge: Yes     Diabetic ketoacidosis without coma associated with type 1 diabetes mellitus [facility])  Hospital discharge follow-up  -     [place] DISCHARGE MEDS RECONCILED W/ CURRENT OUTPATIENT MED LIST      Medical Decision Making: moderate complexity  No follow-ups on file. Subjective:   Diabetes  Pertinent negatives for hypoglycemia include no dizziness or headaches. Pertinent negatives for diabetes include no chest pain, no fatigue and no weakness. Patient is seen today for transitional care follow-up from recent hospitalization secondary to DKA. Patient had initially presented to urgent care on October 21 secondary to respiratory symptoms and elevated blood sugar readings. She felt at that time that the respiratory infection needed addressed as she knew that she could not get her blood sugars under control. She states she was told at that time that it was likely viral in nature and advised just to treat symptomatically. Over the course of a couple of days she started having increased vomiting and became more and more ill and ultimately as she got weaker she came into the emergency room for evaluation. Came in to be seen on October 23 and was found to be in diabetic ketoacidosis. Was admitted and placed on IV fluids and an insulin drip and her blood sugars were subsequently brought down slowly over time.   She was converted to her injectable insulins on 10/26/2022 and did do well with this transition. She was dehydrated upon admission which was improved with IV fluid hydration while inpatient. She did have pain in her jaw as well and it was suspected that she did have an infected upper wisdom tooth and was placed on clindamycin during her hospitalization. Ultimately by 1026 she was doing better overall and was felt to be stable enough for discharge to home. Was discharged with additional oral clindamycin and to resume her insulin doses. She has completed the course of the clindamycin and states that she was not breast-feeding while she was on this medical therapy and had been pumping. She states that her blood sugars are slowly improving and are still just slightly elevated but still not back at her baseline readings. She has always done relatively well managing her blood sugar readings and she is making appropriate insulin dose adjustments. She did just get the continuous glucose monitor and is working on getting more comfortable with its use. She is working with the diabetic educator on managing her sugars with use of a CGM. Patient otherwise has no other acute medical concerns at this time. Inpatient course: Discharge summary reviewed- see chart. Interval history/Current status: see HPI as noted above.     Patient Active Problem List   Diagnosis    Diabetes mellitus type I ([address]    Chronic lymphocytic thyroiditis    Dysmenorrhea    Chronic headaches    Sleep disturbance    Migraine with aura    Hyperopia of both eyes with astigmatism    Weight gain due to medication    Pre-existing type 1 diabetes mellitus during pregnancy    Celiac disease    Hypothyroidism    Cystic fibrosis carrier    Vitamin D deficiency    Diabetic ketoacidosis without coma associated with type 1 diabetes mellitus ([address]    Acute kidney injury superimposed on CKD ([address]       Medications listed as ordered at the time of discharge from hospital     Medication List Accurate as of November 1, 2022 11:59 PM. If you have any questions, ask your nurse or doctor. CONTINUE taking these medications      B-D ULTRAFINE III SHORT PEN 31G X 8 MM Misc  Generic drug: Insulin Pen Needle  Use to inject insulin 6 times daily     Baqsimi One Pack 3 MG/DOSE Powd  Generic drug: Glucagon     BD Lancet Ultrafine 30G Misc  Use to test blood sugar 5 times daily     cetirizine 10 MG tablet  Commonly known as: ZYRTEC  TAKE ONE TABLET BY MOUTH EVERY DAY     Dexcom G6 Sensor Misc  Use daily to check blood sugars. Change every 10 days. Dexcom G6 Transmitter Misc  Use daily to check blood sugars. Change every 90 days. glucagon 1 MG injection  As directed for extreme hypoglycemia     insulin lispro (1 Unit Dial) 100 UNIT/ML Sopn  Commonly known as: HUMALOG/ADMELOG  Inject 12 Units into the skin 3 times daily (before meals)     Ketostix strip  Generic drug: acetone (urine) test  Test daily as needed for ketones     levothyroxine 112 MCG tablet  Commonly known as: SYNTHROID  Take 1 tablet by mouth in the morning.      norethindrone 0.35 MG tablet  Commonly known as: Juliette  Take 1 tablet by mouth daily     ondansetron 4 MG disintegrating tablet  Commonly known as: ZOFRAN-ODT  Take 1 tablet by mouth 3 times daily as needed for Nausea or Vomiting     pantoprazole 40 MG tablet  Commonly known as: PROTONIX  Take 1 tablet by mouth every morning (before breakfast)     Probiotic Acidophilus Tabs  Take 1 tablet by mouth 3 times daily for 10 days     Tresiba FlexTouch 200 UNIT/ML Sopn  Generic drug: Insulin Degludec  Inject 50 Units into the skin daily     TRUEtest Test strip  Generic drug: blood glucose test strips  Use to test blood sugars 5 times daily     * Ultra-Comfort Insulin Syringe 31G X 5/16\" 0.5 ML Misc  Generic drug: Insulin Syringe-Needle U-100  USE ONCE DAILY WITH LANTUS     * B-D INS SYR ULTRAFINE 1CC/31G 31G X 5/16\" 1 ML Misc  Generic drug: Insulin Syringe-Needle U-100 vitamin D 25 MCG (1000 UT) Tabs tablet  Commonly known as: CHOLECALCIFEROL           * This list has 2 medication(s) that are the same as other medications prescribed for you. Read the directions carefully, and ask your doctor or other care provider to review them with you. Medications marked \"taking\" at this time  Outpatient Medications Marked as Taking for the 11/1/22 encounter (Office Visit) with Antonina Be, DO   Medication Sig Dispense Refill    blood glucose test strips (TRUETEST TEST) strip Use to test blood sugars 5 times daily 200 each 0    Insulin Degludec (TRESIBA FLEXTOUCH) 200 UNIT/ML SOPN Inject 50 Units into the skin daily 7 Adjustable Dose Pre-filled Pen Syringe 0    insulin lispro, 1 Unit Dial, (HUMALOG/ADMELOG) 100 UNIT/ML SOPN Inject 12 Units into the skin 3 times daily (before meals) 10 Adjustable Dose Pre-filled Pen Syringe 0    pantoprazole (PROTONIX) 40 MG tablet Take 1 tablet by mouth every morning (before breakfast) 30 tablet 0    ondansetron (ZOFRAN-ODT) 4 MG disintegrating tablet Take 1 tablet by mouth 3 times daily as needed for Nausea or Vomiting 21 tablet 0    Continuous Blood Gluc Transmit (DEXCOM G6 TRANSMITTER) MISC Use daily to check blood sugars. Change every 90 days. 1 each 3    Continuous Blood Gluc Sensor (DEXCOM G6 SENSOR) MISC Use daily to check blood sugars. Change every 10 days. 9 each 3    levothyroxine (SYNTHROID) 112 MCG tablet Take 1 tablet by mouth in the morning.  30 tablet 3    Lancets (BD LANCET ULTRAFINE 30G) MISC Use to test blood sugar 5 times daily 200 each 11    Insulin Pen Needle (B-D ULTRAFINE III SHORT PEN) 31G X 8 MM MISC Use to inject insulin 6 times daily 200 each 6    acetone, urine, test (KETOSTIX) strip Test daily as needed for ketones 50 strip 2    vitamin D (CHOLECALCIFEROL) 25 MCG (1000 UT) TABS tablet Take 1,000 Units by mouth daily      norethindrone (JOLEEN) 0.35 MG tablet Take 1 tablet by mouth daily 28 tablet 11    B-D INS SYR ULTRAFINE 1CC/31G 31G X 5/16\" 1 ML MISC       cetirizine (ZYRTEC) 10 MG tablet TAKE ONE TABLET BY MOUTH EVERY DAY 30 tablet 6    ULTRA-COMFORT INSULIN SYRINGE 31G X 5/16\" 0.5 ML MISC USE ONCE DAILY WITH LANTUS 30 each 3    glucagon (GLUCAGON EMERGENCY) 1 MG injection As directed for extreme hypoglycemia 1 kit 2        Medications patient taking as of now reconciled against medications ordered at time of hospital discharge: Yes    Review of Systems   Constitutional:  Negative for chills, fatigue and fever. HENT:  Negative for congestion, ear pain, postnasal drip, rhinorrhea, sinus pressure, sore throat and trouble swallowing. Eyes:  Negative for discharge and redness. Respiratory:  Negative for cough, shortness of breath and wheezing. Cardiovascular:  Negative for chest pain. Gastrointestinal:  Negative for abdominal pain, constipation, diarrhea, nausea and vomiting. Genitourinary:  Negative for dysuria, flank pain, frequency and urgency. Musculoskeletal:  Negative for arthralgias, myalgias and neck pain. Skin:  Negative for rash and wound. Allergic/Immunologic: Negative for environmental allergies. Neurological:  Negative for dizziness, weakness, light-headedness and headaches. Hematological:  Negative for adenopathy. Psychiatric/Behavioral: Negative. Objective:    /62 (Site: Left Upper Arm, Position: Sitting, Cuff Size: Large Adult)   Pulse 94   Resp 16   Ht 5' 5\" (1.651 m)   Wt 177 lb (80.3 kg)   LMP 10/17/2022 (Exact Date)   SpO2 99%   Breastfeeding No   BMI 29.45 kg/m²   Physical Exam  Vitals and nursing note reviewed. Constitutional:       General: She is not in acute distress. Appearance: Normal appearance. She is well-developed. She is not diaphoretic. HENT:      Head: Normocephalic and atraumatic.       Right Ear: Tympanic membrane, ear canal and external ear normal.      Left Ear: Tympanic membrane, ear canal and external ear normal.      Nose: Nose normal.      Mouth/Throat:      Mouth: Mucous membranes are moist.      Pharynx: Oropharynx is clear. No oropharyngeal exudate. Eyes:      General:         Right eye: No discharge. Left eye: No discharge. Conjunctiva/sclera: Conjunctivae normal.      Pupils: Pupils are equal, round, and reactive to light. Neck:      Thyroid: No thyromegaly. Cardiovascular:      Rate and Rhythm: Normal rate and regular rhythm. Heart sounds: Normal heart sounds. Pulmonary:      Effort: Pulmonary effort is normal.      Breath sounds: Normal breath sounds. No wheezing or rales. Abdominal:      General: Bowel sounds are normal. There is no distension. Palpations: Abdomen is soft. Tenderness: There is no abdominal tenderness. Musculoskeletal:      Cervical back: Normal range of motion and neck supple. Lymphadenopathy:      Cervical: No cervical adenopathy. Skin:     General: Skin is warm and dry. Findings: No rash. Neurological:      Mental Status: She is alert and oriented to person, place, and time. Psychiatric:         Behavior: Behavior normal.         Thought Content: Thought content normal.         Judgment: Judgment normal.     Encounter Diagnoses   Name Primary? Diabetic ketoacidosis without coma associated with type 1 diabetes mellitus ([facility] [address] Yes    Acute kidney injury superimposed on CKD [facility])     Dental infection     Hospital discharge follow-up      No orders of the defined types were placed in this encounter. Orders Placed This Encounter   Procedures    [place] DISCHARGE MEDS RECONCILED W/ CURRENT OUTPATIENT MED LIST     At this time patient is to continue with her current medical therapy. No additional changes are made at this time. Patient is to maintain adequate fluid hydration. Is currently able to tolerate p.o. intake without any difficulty. Patient is to continue to follow a healthy diabetic diet.     Patient can resume breast-feeding now that she has done with her clindamycin dosing. Patient is to return to my office in September as scheduled for her routine medical follow-up visit. She is to follow-up with Aj Ott for continued diabetic management. May return sooner if she has any further problems or symptoms that should arise in the interim. (Please note that portions of this note were completed with a voice recognition program. Efforts were made to edit the dictations but occasionally words are mis-transcribed. )    An electronic signature was used to authenticate this note.   --Giana Tidwell, DO

## 2022-11-10 ENCOUNTER — OFFICE VISIT (OUTPATIENT)
Dept: DIABETES SERVICES | Age: 22
End: 2022-11-10
Payer: COMMERCIAL

## 2022-11-10 VITALS
WEIGHT: 175 LBS | RESPIRATION RATE: 16 BRPM | HEIGHT: 65 IN | BODY MASS INDEX: 29.16 KG/M2 | DIASTOLIC BLOOD PRESSURE: 72 MMHG | HEART RATE: 80 BPM | SYSTOLIC BLOOD PRESSURE: 108 MMHG

## 2022-11-10 DIAGNOSIS — E10.9 TYPE 1 DIABETES MELLITUS WITHOUT COMPLICATION (HCC): Primary | ICD-10-CM

## 2022-11-10 DIAGNOSIS — E03.9 HYPOTHYROIDISM, UNSPECIFIED TYPE: ICD-10-CM

## 2022-11-10 DIAGNOSIS — E66.01 CLASS 2 SEVERE OBESITY DUE TO EXCESS CALORIES WITH SERIOUS COMORBIDITY AND BODY MASS INDEX (BMI) OF 38.0 TO 38.9 IN ADULT (HCC): ICD-10-CM

## 2022-11-10 PROCEDURE — 1036F TOBACCO NON-USER: CPT | Performed by: NURSE PRACTITIONER

## 2022-11-10 PROCEDURE — G8484 FLU IMMUNIZE NO ADMIN: HCPCS | Performed by: NURSE PRACTITIONER

## 2022-11-10 PROCEDURE — 2022F DILAT RTA XM EVC RTNOPTHY: CPT | Performed by: NURSE PRACTITIONER

## 2022-11-10 PROCEDURE — G8419 CALC BMI OUT NRM PARAM NOF/U: HCPCS | Performed by: NURSE PRACTITIONER

## 2022-11-10 PROCEDURE — 95251 CONT GLUC MNTR ANALYSIS I&R: CPT | Performed by: NURSE PRACTITIONER

## 2022-11-10 PROCEDURE — 3046F HEMOGLOBIN A1C LEVEL >9.0%: CPT | Performed by: NURSE PRACTITIONER

## 2022-11-10 PROCEDURE — 1111F DSCHRG MED/CURRENT MED MERGE: CPT | Performed by: NURSE PRACTITIONER

## 2022-11-10 PROCEDURE — 99214 OFFICE O/P EST MOD 30 MIN: CPT | Performed by: NURSE PRACTITIONER

## 2022-11-10 PROCEDURE — G8427 DOCREV CUR MEDS BY ELIG CLIN: HCPCS | Performed by: NURSE PRACTITIONER

## 2022-11-10 RX ORDER — LEVOTHYROXINE SODIUM 112 UG/1
112 TABLET ORAL DAILY
Qty: 30 TABLET | Refills: 3 | Status: SHIPPED | OUTPATIENT
Start: 2022-11-10

## 2022-11-10 RX ORDER — INSULIN LISPRO 100 [IU]/ML
INJECTION, SOLUTION INTRAVENOUS; SUBCUTANEOUS
Qty: 30 ML | Refills: 1 | Status: SHIPPED | OUTPATIENT
Start: 2022-11-10 | End: 2022-11-28 | Stop reason: SDUPTHER

## 2022-11-10 RX ORDER — INSULIN PMP CART,AUT,G6/7,CNTR
1 EACH SUBCUTANEOUS
Qty: 1 KIT | Refills: 0 | Status: SHIPPED | OUTPATIENT
Start: 2022-11-10

## 2022-11-10 RX ORDER — INSULIN PMP CART,AUT,G6/7,CNTR
1 EACH SUBCUTANEOUS
Qty: 10 EACH | Refills: 5 | Status: SHIPPED | OUTPATIENT
Start: 2022-11-10

## 2022-11-10 ASSESSMENT — ENCOUNTER SYMPTOMS
SHORTNESS OF BREATH: 0
DIARRHEA: 0
ABDOMINAL PAIN: 0
RESPIRATORY NEGATIVE: 1

## 2022-11-10 NOTE — PROGRESS NOTES
[address], [address]  [facility] [address]  [phone]        HISTORY:    Jay Ramos presents today for evaluation and management of:  Chief Complaint   Patient presents with    Diabetes    3 Month Follow-Up       Patient Care Team:  Thierno Back DO as PCP - General (Family Medicine)  Thierno Back DO as PCP - [facility]      Interval History:    Current Diabetic Medications  Tresiba 50 units in the am, humalog with meals ICR 10:1 with breakfast in lunch and 5:1 with dinner. DKA episodes: 2009 DKA with diagnosis after H1N1 vaccine. 2012 DKA unknown cause. 2022 DKA due to illness    08/09/22   Jay Ramos is a 22 y.o. female patient who presents to clinic today for her diabetes. she has a history of wide excursions, hypothyroidism and celiac disease which contributes to her diabetes. At previous visit insulin was increased. she denies any current signs or symptoms of hyper/hypoglycemia. she states they are taking their medications as prescribed without any adverse effects. Diet: regular carb counting. Exercise: none  BS testing: uses cgm daily with success and is adherent to cgm therapy  Issues: wide fluctuations  Diabetic foot exam up-to-date: No  Diabetic retinal exam up-to-date: Yes  Hypoglycemia as needed treatment: emergency glucagon nasal spray    11/10/22   Jay aRmos is a 25 y.o. female patient who presents to clinic today for her diabetes. she has a history of wide excursions, hypothyroidism and celiac disease which contributes to her diabetes. At previous visit insulin was increased. She had a recent episode of dka r/t illness. she denies any current signs or symptoms of hyper/hypoglycemia. she states they are taking their medications as prescribed without any adverse effects.    Diet: counting carbs  Exercise: none  BS testing: uses cgm daily with success and is adherent to cgm therapy  Hypoglycemia: Yes    Sleepiness, Sweats, and Tremors     Hypoglycemia Frequency: 1 per week  Hypoglycemia as needed treatment: snack, emergency glucagon  Issues: denies  Diabetic foot exam up-to-date: Yes  Diabetic retinal exam up-to-date: No    Diabetes complications:nephropathy      Hypothyroidism- taking synthroid 112 mcg daily. Obesity- Working on weight loss. Past Medical History:   Diagnosis Date    Abdominal pain     Abnormal stools     Acute kidney injury superimposed on CKD ([address] 10/24/2022    Celiac disease 2019    Chronic lymphocytic thyroiditis     Delivery by emergency  section 2022    Diabetic ketoacidosis ([address]     Hyperopia with astigmatism     Hypothyroid     Insomnia     Migraine     Precocious adrenarche ([address]     Precocious puberty     Seasonal allergies     Seizures ([address]     D/T BS out of control, no seizure disorder    Type 1 diabetes mellitus without (mention of) complication     date of diagnosis - 2/15/2010     Family History   Problem Relation Age of Onset    Diabetes Maternal Grandfather     Retinal Detachment Maternal Grandfather     Diabetes Father         borderline    Neuropathy Father     Heart Disease Paternal Grandmother     Asthma Paternal Grandmother     Other Paternal Grandmother         edema and history of hemophilia    Cancer Maternal Grandmother 72        breast cancer    Hypertension Other         paternal side    Diabetes Other         paternal side     Glaucoma Neg Hx     Cataracts Neg Hx      Social History     Tobacco Use    Smoking status: Never     Passive exposure: Yes    Smokeless tobacco: Never    Tobacco comments:     Passive smoke exposure.    Substance Use Topics    Alcohol use: No     Alcohol/week: 0.0 standard drinks    Drug use: Not Currently     Types: Marijuana Evelyne Coad)     Comment: last use , stopped     Allergies   Allergen Reactions    Oxycodone Hives and Itching    Gluten Meal     Insulin Detemir Other (See Comments), Swelling and Hives     Local reaction  Local reaction    Other Other (See Comments)     flouride  Causes hives  flouride  Causes hives    Codeine Hives, Nausea And Vomiting and Rash     Other reaction(s): Vomiting       MEDICATIONS:  Current Outpatient Medications   Medication Sig Dispense Refill    levothyroxine (SYNTHROID) 112 MCG tablet Take 1 tablet by mouth daily 30 tablet 3    Insulin Disposable Pump (OMNIPOD 5 G6 POD, GEN 5,) MISC 1 each by Does not apply route every 3 days 10 each 5    Insulin Disposable Pump (OMNIPOD 5 G6 INTRO, GEN 5,) KIT 1 kit by Does not apply route every 3 days 1 kit 0    insulin lispro, 1 Unit Dial, (HUMALOG KWIKPEN) 100 UNIT/ML SOPN For use with omnipod max daily dose 90 units 30 mL 1    Insulin Degludec (TRESIBA FLEXTOUCH) 200 UNIT/ML SOPN Inject 50 Units into the skin daily 7 Adjustable Dose Pre-filled Pen Syringe 0    insulin lispro, 1 Unit Dial, (HUMALOG/ADMELOG) 100 UNIT/ML SOPN Inject 12 Units into the skin 3 times daily (before meals) 10 Adjustable Dose Pre-filled Pen Syringe 0    pantoprazole (PROTONIX) 40 MG tablet Take 1 tablet by mouth every morning (before breakfast) 30 tablet 0    ondansetron (ZOFRAN-ODT) 4 MG disintegrating tablet Take 1 tablet by mouth 3 times daily as needed for Nausea or Vomiting 21 tablet 0    vitamin D (CHOLECALCIFEROL) 25 MCG (1000 UT) TABS tablet Take 1,000 Units by mouth daily      norethindrone (JOLEEN) 0.35 MG tablet Take 1 tablet by mouth daily 28 tablet 11    cetirizine (ZYRTEC) 10 MG tablet TAKE ONE TABLET BY MOUTH EVERY DAY 30 tablet 6    blood glucose test strips (TRUETEST TEST) strip Use to test blood sugars 5 times daily 200 each 0    Continuous Blood Gluc Transmit (DEXCOM G6 TRANSMITTER) MISC Use daily to check blood sugars. Change every 90 days. 1 each 3    Continuous Blood Gluc Sensor (DEXCOM G6 SENSOR) MISC Use daily to check blood sugars. Change every 10 days.  9 each 3    Lancets (BD LANCET ULTRAFINE 30G) MISC Use to test blood sugar 5 times daily 200 each 11    Insulin Pen Needle (B-D ULTRAFINE III SHORT PEN) 31G X 8 MM MISC Use to inject insulin 6 times daily 200 each 6    acetone, urine, test (KETOSTIX) strip Test daily as needed for ketones 50 strip 2    BAQSIMI ONE PACK 3 MG/DOSE POWD  (Patient not taking: Reported on 11/1/2022)      B-D INS SYR ULTRAFINE 1CC/31G 31G X 5/16\" 1 ML MISC       ULTRA-COMFORT INSULIN SYRINGE 31G X 5/16\" 0.5 ML MISC USE ONCE DAILY WITH LANTUS 30 each 3    glucagon (GLUCAGON EMERGENCY) 1 MG injection As directed for extreme hypoglycemia 1 kit 2     No current facility-administered medications for this visit. Review ofSymptoms:  Review of Systems   Constitutional:  Positive for fatigue. Negative for unexpected weight change. Eyes:  Negative for visual disturbance. Respiratory: Negative. Negative for shortness of breath. Cardiovascular:  Negative for chest pain and leg swelling. Gastrointestinal:  Negative for abdominal pain and diarrhea. Endocrine: Negative for polydipsia, polyphagia and polyuria. Genitourinary: Negative. Musculoskeletal: Negative. Skin:  Negative for rash and wound. Neurological:  Negative for dizziness, tremors, seizures and headaches. Psychiatric/Behavioral: Negative. Negative for confusion and decreased concentration. Theremainder of a complete 14-point review of systems is negative. Vital Signs: /72 (Site: Right Upper Arm, Position: Sitting, Cuff Size: Medium Adult)   Pulse 80   Resp 16   Ht 5' 5.25\" (1.657 m)   Wt 175 lb (79.4 kg)   LMP 11/07/2022 (Exact Date)   BMI 28.90 kg/m²      Wt Readings from Last 3 Encounters:   11/10/22 175 lb (79.4 kg)   11/01/22 177 lb (80.3 kg)   10/26/22 176 lb (79.8 kg)     Body mass index is 28.9 kg/m².   LABS:  Hemoglobin A1C   Date Value Ref Range Status   10/24/2022 9.0 (H) 4.0 - 6.0 % Final   08/09/2022 10.2 (H) 4.0 - 6.0 % Final     Lab Results   Component Value Date LABMICR 20 08/09/2022     Lab Results   Component Value Date     10/26/2022    K 3.9 10/26/2022     (H) 10/26/2022    CO2 19 (L) 10/26/2022    BUN 4 (L) 10/26/2022    CREATININE 0.51 10/26/2022    GLUCOSE 183 (H) 10/26/2022    CALCIUM 8.5 (L) 10/26/2022    PROT 7.5 06/30/2021    LABALBU 4.5 06/30/2021    BILITOT 0.60 06/30/2021    ALKPHOS 84 06/30/2021    AST 14 06/30/2021    ALT 11 06/30/2021    LABGLOM >60 10/26/2022    GFRAA >60 04/28/2022     Lab Results   Component Value Date    CHOL 214 (H) 04/28/2022    CHOL 124 09/01/2020    CHOL 174 07/25/2019     Lab Results   Component Value Date    TRIG 232 (H) 04/28/2022    TRIG 142 09/01/2020    TRIG 155 (H) 07/25/2019     Lab Results   Component Value Date    HDL 41 04/28/2022    HDL 31 (L) 09/01/2020    HDL 33 (L) 07/25/2019     Lab Results   Component Value Date    LDLCHOLESTEROL 127 04/28/2022    LDLCHOLESTEROL 65 09/01/2020    LDLCHOLESTEROL 110 07/25/2019     Lab Results   Component Value Date    VLDL NOT REPORTED 09/01/2020    VLDL NOT REPORTED (H) 07/25/2019    VLDL NOT REPORTED (H) 02/15/2019     Lab Results   Component Value Date    CHOLHDLRATIO 5.2 (H) 04/28/2022    CHOLHDLRATIO 4.0 09/01/2020    CHOLHDLRATIO 5.3 (H) 07/25/2019           Physical Exam  Constitutional:       Appearance: She is well-developed. Eyes:      Pupils: Pupils are equal, round, and reactive to light. Neck:      Thyroid: No thyroid mass, thyromegaly or thyroid tenderness. Cardiovascular:      Rate and Rhythm: Normal rate and regular rhythm. Heart sounds: Normal heart sounds. Pulmonary:      Effort: Pulmonary effort is normal.      Breath sounds: Normal breath sounds. Abdominal:      General: Bowel sounds are normal.      Palpations: Abdomen is soft. Skin:     General: Skin is warm and dry. Comments: Negative for open/nonhealing wounds. Negative for lipohypertrophy.      Neurological:      Mental Status: She is alert and oriented to person, place, and less than 180 mg/dl. - Labs reviewed including most recent A1c, UACR and kidney function. Repeat labs due in 3 months.    -We discussed in great detail dietary modifications they can make to better improve their blood sugars. -follow up diabetes education completed, all questions answered. CGM report downloaded and reviewed from the past 2 weeks scanned to media tab. Time in range 27%, 71% hyperglycemia, and hypoglycemia 1%. average glucose 245 mg/dl. -will split bolus in half before meal and half 2 hours after   -will try omnipod with less aggressive settings as she is fearful of lows. Discussed signs and symptoms of hyper/hypoglycemia and how to treat. Encouraged 150 minutes of physical activity per week. Follow a low carbohydrate diet. Encouraged at least 7 hours of sleep. The patient was informed of the goals of diabetes management. This can only be accomplished by watching their diet and exercise levels. We certainly use medicines to help attain these goals. The consequences of not controlling blood sugars were discussed. These include blindness, heart disease, stroke, kidney disease, and possibly need for dialysis. They were told to be careful with their foot care as diabetics often have nerve damage, infections and risk for limb amutations . They also need a dilated eye exam yearly. We discussed the issues of diet, exercise, medication, complication avoidance, reviewed the signs and symptoms of diabetes, hypoglycemic episodes, significance of HbA1C.     - Insulin Disposable Pump (OMNIPOD 5 G6 POD, GEN 5,) MISC; 1 each by Does not apply route every 3 days  Dispense: 10 each; Refill: 5  - Insulin Disposable Pump (OMNIPOD 5 G6 INTRO, GEN 5,) KIT; 1 kit by Does not apply route every 3 days  Dispense: 1 kit; Refill: 0  - insulin lispro, 1 Unit Dial, (HUMALOG KWIKPEN) 100 UNIT/ML SOPN; For use with omnipod max daily dose 90 units  Dispense: 30 mL; Refill: 1    2.  Hypothyroidism, unspecified type    - levothyroxine (SYNTHROID) 112 MCG tablet; Take 1 tablet by mouth daily  Dispense: 30 tablet; Refill: 3  - TSH With Reflex Ft4; Future    3. Class 2 severe obesity due to excess calories with serious comorbidity and body mass index (BMI) of 38.0 to 38.9 in adult [facility])  Reduce calories and increase physical activity to achieve a slow and steady weight loss to improve blood pressure, cholesterol and diabetes. Answered all patient questions. Agrees to follow plan of care and to follow up in 1 months, sooner if needed. Call office if unexplained blood sugars less than 70 occur or above 400. Call office or access [email or web address] with any further questions or concerns. Be sure to bring glucometer/food log at next appointment. Total time spent reviewing chart, labs, counseling patient and documenting on the date of the encounter: 30 min.       Electronically signed by MARIA D Moulton CNP on 11/10/2022 at 2:43 PM      (Please note that portions of this note were completed with a voice-recognition program. Efforts were made to edit the dictation but occasionally words are mis-transcribed.)

## 2022-11-16 DIAGNOSIS — E10.9 TYPE 1 DIABETES MELLITUS WITHOUT COMPLICATION (HCC): ICD-10-CM

## 2022-11-16 RX ORDER — INSULIN LISPRO 100 [IU]/ML
INJECTION, SOLUTION INTRAVENOUS; SUBCUTANEOUS
Qty: 30 ML | Refills: 1 | OUTPATIENT
Start: 2022-11-16

## 2022-11-16 NOTE — TELEPHONE ENCOUNTER
Garret Hauser has been ordering provider. Next OV  with her 12/14/22.     Shira Greco called requesting a refill of the below medication which has been pended for you:     Requested Prescriptions     Pending Prescriptions Disp Refills    insulin lispro, 1 Unit Dial, (HUMALOG KWIKPEN) 100 UNIT/ML SOPN 30 mL 1     Sig: For use with omnipod max daily dose 90 units       Last Appointment Date: 11/1/2022  Next Appointment Date: 9/14/2023    Allergies   Allergen Reactions    Oxycodone Hives and Itching    Gluten Meal     Insulin Detemir Other (See Comments), Swelling and Hives     Local reaction  Local reaction    Other Other (See Comments)     flouride  Causes hives  flouride  Causes hives    Codeine Hives, Nausea And Vomiting and Rash     Other reaction(s): Vomiting

## 2022-11-28 DIAGNOSIS — E10.9 TYPE 1 DIABETES MELLITUS WITHOUT COMPLICATION (HCC): ICD-10-CM

## 2022-11-28 RX ORDER — INSULIN LISPRO 100 [IU]/ML
INJECTION, SOLUTION INTRAVENOUS; SUBCUTANEOUS
Qty: 30 ML | Refills: 1 | Status: SHIPPED | OUTPATIENT
Start: 2022-11-28

## 2022-12-07 DIAGNOSIS — E10.9 TYPE 1 DIABETES MELLITUS WITHOUT COMPLICATION (HCC): ICD-10-CM

## 2022-12-07 RX ORDER — INSULIN PMP CART,AUT,G6/7,CNTR
EACH SUBCUTANEOUS
Qty: 1 KIT | Refills: 0 | Status: SHIPPED | OUTPATIENT
Start: 2022-12-07

## 2022-12-09 ENCOUNTER — HOSPITAL ENCOUNTER (OUTPATIENT)
Age: 22
Setting detail: SPECIMEN
Discharge: HOME | End: 2022-12-09
Payer: COMMERCIAL

## 2022-12-09 ENCOUNTER — OFFICE VISIT (OUTPATIENT)
Dept: OBGYN | Age: 22
End: 2022-12-09
Payer: COMMERCIAL

## 2022-12-09 VITALS
OXYGEN SATURATION: 99 % | HEIGHT: 65 IN | BODY MASS INDEX: 29.09 KG/M2 | WEIGHT: 174.6 LBS | SYSTOLIC BLOOD PRESSURE: 124 MMHG | DIASTOLIC BLOOD PRESSURE: 82 MMHG | RESPIRATION RATE: 16 BRPM | HEART RATE: 86 BPM

## 2022-12-09 DIAGNOSIS — Z12.4 SCREENING FOR MALIGNANT NEOPLASM OF CERVIX: ICD-10-CM

## 2022-12-09 DIAGNOSIS — Z11.3 ROUTINE SCREENING FOR STI (SEXUALLY TRANSMITTED INFECTION): ICD-10-CM

## 2022-12-09 DIAGNOSIS — Z01.419 WELL WOMAN EXAM WITH ROUTINE GYNECOLOGICAL EXAM: Primary | ICD-10-CM

## 2022-12-09 LAB
CANDIDA SPECIES, DNA PROBE: POSITIVE
GARDNERELLA VAGINALIS, DNA PROBE: NEGATIVE
SOURCE: ABNORMAL
TRICHOMONAS VAGINALIS DNA: NEGATIVE

## 2022-12-09 PROCEDURE — 87591 N.GONORRHOEAE DNA AMP PROB: CPT

## 2022-12-09 PROCEDURE — 87491 CHLMYD TRACH DNA AMP PROBE: CPT

## 2022-12-09 PROCEDURE — 87510 GARDNER VAG DNA DIR PROBE: CPT

## 2022-12-09 PROCEDURE — 87480 CANDIDA DNA DIR PROBE: CPT

## 2022-12-09 PROCEDURE — 87660 TRICHOMONAS VAGIN DIR PROBE: CPT

## 2022-12-09 PROCEDURE — 99395 PREV VISIT EST AGE 18-39: CPT | Performed by: NURSE PRACTITIONER

## 2022-12-09 ASSESSMENT — ENCOUNTER SYMPTOMS
ABDOMINAL PAIN: 0
CONSTIPATION: 0
DIARRHEA: 0
SHORTNESS OF BREATH: 0

## 2022-12-09 ASSESSMENT — PATIENT HEALTH QUESTIONNAIRE - PHQ9
SUM OF ALL RESPONSES TO PHQ9 QUESTIONS 1 & 2: 0
SUM OF ALL RESPONSES TO PHQ QUESTIONS 1-9: 0
1. LITTLE INTEREST OR PLEASURE IN DOING THINGS: 0
2. FEELING DOWN, DEPRESSED OR HOPELESS: 0
SUM OF ALL RESPONSES TO PHQ QUESTIONS 1-9: 0

## 2022-12-09 NOTE — PROGRESS NOTES
2022    Primary Care Provider: Giana Tidwell, DO    Subjective:     Silvina Escamilla is a 25 y.o. female  who presents for her Annual Exam    Chief Complaint   Patient presents with    Annual Exam     Chaperone present? Yes DEVON Edmondson LPN    Princess Luevano presents for her first Annual exam and Pap test. She has h/o precocious puberty and current h/o T1DM and celiac disease. She is currently taking OCPs and has regular cycles. She denies breast or pelvic concerns. Patient's last menstrual period was 2022 (exact date).     OB History    Para Term  AB Living   1 1 0 1 0 1   SAB IAB Ectopic Molar Multiple Live Births   0 0 0 0 0 1      # Outcome Date GA Lbr Pancho/2nd Weight Sex Delivery Anes PTL Lv   1  22 35w1d  9 lb 1 oz (4.11 kg) M  Spinal Y VÍCTOR      Name: Lynn Gravest: 8  Apgar5: 8     Past Medical History:   Diagnosis Date    Abdominal pain     Abnormal stools     Acute kidney injury superimposed on CKD ([address] 10/24/2022    Celiac disease 2019    Chronic lymphocytic thyroiditis     Delivery by emergency  section 2022    Diabetic ketoacidosis ([address]     Hyperopia with astigmatism     Hypothyroid     Insomnia     Migraine     Precocious adrenarche ([address]     Precocious puberty      delivery     Rh incompatibility     Seasonal allergies     Seizures ([address]     D/T BS out of control, no seizure disorder    Type 1 diabetes mellitus without (mention of) complication     date of diagnosis - 2/15/2010     Past Surgical History:   Procedure Laterality Date     SECTION  2022    OTHER SURGICAL HISTORY  2011    SUPPRELIN IMPLANT PLACEMENT FOR PRECOCIOUS PUBERTY    OTHER SURGICAL HISTORY  2012    SUPPRELIN IMPLANT REMOVAL FOR PRECOCIOUS PUBERTY    TONSILLECTOMY  2012    UPPER GASTROINTESTINAL ENDOSCOPY  2012     Family History   Problem Relation Age of Onset    Diabetes Father         borderline Neuropathy Father     Breast Cancer Maternal Grandmother         HRS2    Diabetes Maternal Grandfather     Retinal Detachment Maternal Grandfather     Heart Disease Paternal Grandmother     Asthma Paternal Grandmother     Other Paternal Grandmother         edema and history of hemophilia    Hypertension Other         paternal side    Diabetes Other         paternal side     Glaucoma Neg Hx     Cataracts Neg Hx      Social History     Socioeconomic History    Marital status: Single     Spouse name: Not on file    Number of children: Not on file    Years of education: Not on file    Highest education level: Not on file   Occupational History     Employer: N/A   Tobacco Use    Smoking status: Never     Passive exposure: Yes    Smokeless tobacco: Never    Tobacco comments:     Passive smoke exposure.    Vaping Use    Vaping Use: Never used   Substance and Sexual Activity    Alcohol use: Yes     Comment: rare    Drug use: Not Currently     Types: Marijuana Brunilda Forte)     Comment: last use 6/26, stopped    Sexual activity: Yes     Partners: Male     Birth control/protection: Pill, Condom   Other Topics Concern    Not on file   Social History Narrative    ** Merged History Encounter **          Social Determinants of Health     Financial Resource Strain: Low Risk     Difficulty of Paying Living Expenses: Not very hard   Food Insecurity: No Food Insecurity    Worried About Running Out of Food in the Last Year: Never true    Ran Out of Food in the Last Year: Never true   Transportation Needs: Not on file   Physical Activity: Not on file   Stress: Not on file   Social Connections: Not on file   Intimate Partner Violence: Not on file   Housing Stability: Not on file       Medications:  Current Outpatient Medications on File Prior to Visit   Medication Sig Dispense Refill    Insulin Disposable Pump (OMNIPOD 5 G6 INTRO, GEN 5,) KIT USE AS DIRECTED 1 kit 0    insulin lispro, 1 Unit Dial, (HUMALOG KWIKPEN) 100 UNIT/ML SOPN For use with omnipod max daily dose 90 units 30 mL 1    levothyroxine (SYNTHROID) 112 MCG tablet Take 1 tablet by mouth daily 30 tablet 3    blood glucose test strips (TRUETEST TEST) strip Use to test blood sugars 5 times daily 200 each 0    Insulin Degludec (TRESIBA FLEXTOUCH) 200 UNIT/ML SOPN Inject 50 Units into the skin daily 7 Adjustable Dose Pre-filled Pen Syringe 0    ondansetron (ZOFRAN-ODT) 4 MG disintegrating tablet Take 1 tablet by mouth 3 times daily as needed for Nausea or Vomiting 21 tablet 0    Continuous Blood Gluc Transmit (DEXCOM G6 TRANSMITTER) MISC Use daily to check blood sugars. Change every 90 days. 1 each 3    Continuous Blood Gluc Sensor (DEXCOM G6 SENSOR) MISC Use daily to check blood sugars. Change every 10 days.  9 each 3    Lancets (BD LANCET ULTRAFINE 30G) MISC Use to test blood sugar 5 times daily 200 each 11    Insulin Pen Needle (B-D ULTRAFINE III SHORT PEN) 31G X 8 MM MISC Use to inject insulin 6 times daily 200 each 6    acetone, urine, test (KETOSTIX) strip Test daily as needed for ketones 50 strip 2    vitamin D (CHOLECALCIFEROL) 25 MCG (1000 UT) TABS tablet Take 1,000 Units by mouth daily      BAQSIMI ONE PACK 3 MG/DOSE POWD       norethindrone (JOLEEN) 0.35 MG tablet Take 1 tablet by mouth daily 28 tablet 11    B-D INS SYR ULTRAFINE 1CC/31G 31G X 5/16\" 1 ML MISC       cetirizine (ZYRTEC) 10 MG tablet TAKE ONE TABLET BY MOUTH EVERY DAY 30 tablet 6    ULTRA-COMFORT INSULIN SYRINGE 31G X 5/16\" 0.5 ML MISC USE ONCE DAILY WITH LANTUS 30 each 3    glucagon (GLUCAGON EMERGENCY) 1 MG injection As directed for extreme hypoglycemia 1 kit 2    Insulin Disposable Pump (OMNIPOD 5 G6 POD, GEN 5,) MISC 1 each by Does not apply route every 3 days (Patient not taking: Reported on 12/9/2022) 10 each 5    pantoprazole (PROTONIX) 40 MG tablet Take 1 tablet by mouth every morning (before breakfast) (Patient not taking: Reported on 12/9/2022) 30 tablet 0     No current facility-administered medications on file prior to visit. Allergies:  Oxycodone, Gluten meal, Insulin detemir, Other, and Codeine    Gynecological History:  Age of menarche: spotting in 1st grade, precocious puberty, had implant until age 7, held off menses until age 7  Frequency: regular with OCP  Duration: 7 days   Flow is: variable  Menstrual products: pads, changing every 2h   Associated SX with menses: cramping and nausea  Menstrual pain: mild typically but can be severe, Tylenol, ibuprofen only takes 1 tablet of each every 4-8 hrs    Sexually active? yes  Sexual partners: single partner and male  Pain or bleeding with intercourse? no  Contraceptive method: OCP progesterone only  Reversible Birth Control: Yes OCP      Permanent Sterilization: No   Hormone Replacement Exposure: No      STI HX: no  Infertility: no  Reproductive life plan: would like to have 1 more child    Do you do self breast exams? Yes  Breast Family HX:  positive family history of breast cancer in her [facility]  Breast Patient HX: no prior history of breast cancer, biopsy or abnormal mammograms  Family History of Cervical, Ovarian, Colon or Uterine Cancer: Yes unsure of which types     Preventative Health Testing:  Date of Last Pap Smear: NA  Abnormal Pap Smear History: NA  Colposcopy History: NA   Date of Last Mammogram: NA  Date of Last Colonoscopy: NA  Date of Last Bone Density: NA    Immunization status: up to date and documented, completed childhood series. 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 Yes  Do you practice safe driving habits?  Use of seatbelt Yes    Review of Systems Constitutional:  Negative for chills, fatigue and fever. Eyes:  Negative for visual disturbance. Respiratory:  Negative for shortness of breath. Cardiovascular:  Negative for chest pain. Gastrointestinal:  Negative for abdominal pain, constipation and diarrhea. Endocrine: Negative for cold intolerance and heat intolerance. Genitourinary:  Negative for difficulty urinating, dysuria, frequency, menstrual problem, vaginal bleeding, vaginal discharge and vaginal pain. Musculoskeletal: Negative. Skin: Negative. Neurological:  Negative for headaches. Psychiatric/Behavioral: Negative. Objective:     Vitals:    12/09/22 1428   BP: 124/82   Pulse: 86   Resp: 16   SpO2: 99%       Physical Exam  Vitals and nursing note reviewed. Exam conducted with a chaperone present. Constitutional:       Appearance: Normal appearance. HENT:      Head: Normocephalic. Cardiovascular:      Rate and Rhythm: Normal rate and regular rhythm. Pulmonary:      Effort: Pulmonary effort is normal.      Breath sounds: Normal breath sounds. Chest:   Breasts:     Right: Normal.      Left: Normal.      Comments: Breast exam WNL. No masses palpable, no nipple or skin retraction and / or dimpling, no nipple discharge. No palpable lymph nodes. Abdominal:      General: Abdomen is flat. Bowel sounds are normal.      Palpations: Abdomen is soft. Genitourinary:     General: Normal vulva. Pubic Area: No rash or pubic lice. Labia:         Right: No rash, tenderness, lesion or injury. Left: No rash, tenderness, lesion or injury. Urethra: No prolapse, urethral pain, urethral swelling or urethral lesion. Vagina: Normal. No vaginal discharge. Cervix: Normal.      Uterus: Normal. Not tender. Adnexa: Right adnexa normal and left adnexa normal.      Comments: External genitalia WNL, no lesions noted. Vaginal canal is pink with normal appearing nonodorous discharge.  Cervix is parous, freely mobile and nontender. Uterus is NSSAVNT, adnexa are small, freely mobile and nontender. No abnormalities noted. Musculoskeletal:         General: Normal range of motion. Cervical back: Normal range of motion. No tenderness. Skin:     General: Skin is warm and dry. Neurological:      General: No focal deficit present. Mental Status: She is alert and oriented to person, place, and time. Psychiatric:         Mood and Affect: Mood normal.         Behavior: Behavior normal.       Assessment:     Eliseo Beaver is a 25 y.o. non-pregnant female presenting for her Annual exam.     Diagnosis Orders   1. Well woman exam with routine gynecological exam        2. Screening for malignant neoplasm of cervix  PAP SMEAR      3.  Routine screening for STI (sexually transmitted infection)  C.trachomatis N.gonorrhoeae DNA, Thin Prep    Vaginitis DNA Probe        Chief Complaint   Patient presents with    Annual Exam       Past Medical History:   Diagnosis Date    Abdominal pain     Abnormal stools     Acute kidney injury superimposed on CKD ([address] 10/24/2022    Celiac disease 2019    Chronic lymphocytic thyroiditis     Delivery by emergency  section 2022    Diabetic ketoacidosis ([address]     Hyperopia with astigmatism     Hypothyroid     Insomnia     Migraine     Precocious adrenarche ([address]     Precocious puberty      delivery     Rh incompatibility     Seasonal allergies     Seizures ([address]     D/T BS out of control, no seizure disorder    Type 1 diabetes mellitus without (mention of) complication     date of diagnosis - 2/15/2010            Patient Active Problem List   Diagnosis    Diabetes mellitus type I ([address]    Chronic lymphocytic thyroiditis    Dysmenorrhea    Chronic headaches    Sleep disturbance    Migraine with aura    Hyperopia of both eyes with astigmatism    Weight gain due to medication    Pre-existing type 1 diabetes mellitus during pregnancy    Celiac disease    Hypothyroidism Cystic fibrosis carrier    Vitamin D deficiency    Diabetic ketoacidosis without coma associated with type 1 diabetes mellitus (HCC)    Acute kidney injury superimposed on CKD ([facility]            Plan:         Testing and cultures ordered; pt will be contacted with results  Discussed breast self-awareness: monitor for lumps and changes in size, shape, color, texture of breasts and inform provider if found  Repeat pap (if negative) every 3 years under 27years old; 3 - 5 years if over 27; no pap needed after age 72. STI counseling and prevention reviewed. Tobacco & Secondary smoke risks reviewed; instructed on avoidance  Supplements, Calcium, and Vitamin D dosing reviewed  Hereditary Breast, Ovarian, Colon and Uterine Cancer screening completed. Mammograms every 1 year starting at 36years old  Age-appropriate colonoscopy screening reviewed as well as onset for bone density testing; DEXA if > 72years old. Routine health maintenance per patient's PCP  Counseling/Education provided: be sure to apply sunscreen to prevent skin cancer/melanoma    Follow-up:     Return for f/u in 1 year for annual exam or as needed. 40 minutes spent on education, evaluation, and assessment.     USPSTF tool to select preventive measures by age/sex/risk     Electronically signed by MARIA D Chatterjee CNM 12/12/2022 12:54 PM .

## 2022-12-12 DIAGNOSIS — B37.9 CANDIDA ALBICANS INFECTION: Primary | ICD-10-CM

## 2022-12-12 RX ORDER — FLUCONAZOLE 150 MG/1
TABLET ORAL
Qty: 2 TABLET | Refills: 0 | Status: SHIPPED | OUTPATIENT
Start: 2022-12-12

## 2022-12-12 NOTE — PROGRESS NOTES
S: Montgomery Rolandabhilash presented for annual exam and pap  O: Vaginal probe + for candida  A: Candida albicans infection  P: fluconazole 150 mg po x1 dose, take 2nd dose in 5 days     Signed electronically by MARIA D Jesus CNM,   12/12/2022 at 5:44 PM

## 2022-12-14 ENCOUNTER — OFFICE VISIT (OUTPATIENT)
Dept: DIABETES SERVICES | Age: 22
End: 2022-12-14
Payer: COMMERCIAL

## 2022-12-14 VITALS
HEART RATE: 88 BPM | RESPIRATION RATE: 14 BRPM | DIASTOLIC BLOOD PRESSURE: 72 MMHG | WEIGHT: 171 LBS | BODY MASS INDEX: 28.49 KG/M2 | SYSTOLIC BLOOD PRESSURE: 98 MMHG | HEIGHT: 65 IN

## 2022-12-14 DIAGNOSIS — E10.9 TYPE 1 DIABETES MELLITUS WITHOUT COMPLICATION (HCC): Primary | ICD-10-CM

## 2022-12-14 DIAGNOSIS — E03.9 HYPOTHYROIDISM, UNSPECIFIED TYPE: ICD-10-CM

## 2022-12-14 PROCEDURE — 99214 OFFICE O/P EST MOD 30 MIN: CPT | Performed by: NURSE PRACTITIONER

## 2022-12-14 ASSESSMENT — ENCOUNTER SYMPTOMS
RESPIRATORY NEGATIVE: 1
SHORTNESS OF BREATH: 0
DIARRHEA: 0
ABDOMINAL PAIN: 0

## 2022-12-14 NOTE — PROGRESS NOTES
[facility] [address], [address]  [facility] [address]  [phone]        HISTORY:    Ronald Ovalle presents today for evaluation and management of:  Chief Complaint   Patient presents with    Diabetes     Has not started omnipod yet. Will get training set up. 1 Month Follow-Up       Patient Care Team:  Mignon Fletcher DO as PCP - General (Family Medicine)  Mignon Fletcher DO as PCP - [facility] Empaneled Provider      Interval History:    Current Diabetic Medications  Tresiba 50 units in the am, humalog with meals ICR 10:1 with breakfast in lunch and 5:1 with dinner. DKA episodes: 2009 DKA with diagnosis after H1N1 vaccine. 2012 DKA unknown cause. 2022 DKA due to illness    11/10/22   Ronald Ovalle is a 25 y.o. female patient who presents to clinic today for her diabetes. she has a history of wide excursions, hypothyroidism and celiac disease which contributes to her diabetes. At previous visit insulin was increased. She had a recent episode of dka r/t illness. she denies any current signs or symptoms of hyper/hypoglycemia. she states they are taking their medications as prescribed without any adverse effects. Diet: counting carbs  Exercise: none  BS testing: uses cgm daily with success and is adherent to cgm therapy  Hypoglycemia: Yes    Sleepiness, Sweats, and Tremors                 Hypoglycemia Frequency: 1 per week  Hypoglycemia as needed treatment: snack, emergency glucagon  Issues: denies  Diabetic foot exam up-to-date: Yes  Diabetic retinal exam up-to-date: No     Diabetes complications:nephropathy    12/14/22   Ronald Ovalle is a 25 y.o. female patient who presents to clinic today for her diabetes. she has a history of wide excursions, hypothyroidism and celiac disease  which contributes to her diabetes. At previous visit omnipod was ordered she has received supplies but has not set up traning.  she denies any current signs or symptoms of hyper/hypoglycemia. she states they are taking their medications as prescribed without any adverse effects. Diet: carb counting  Exercise: none  BS testing: uses cgm daily with success and is adherent to cgm therapy  Hypoglycemia: Yes    Sweats and Tremors     Hypoglycemia Frequency: 1 per day  Hypoglycemia as needed treatment: juice, snack, emergency glucagon   Issues: denies   Diabetic foot exam up-to-date: Yes  Diabetic retinal exam up-to-date: No    Diabetes complications:none      Hypothyroidism-  Denies excessive fatigue, change in weight, cold or heat intolerance, mood changes, weakness, tremor, constipation, diarrhea, neck pain, change in voice, sleep issues or changes in hair. Taking synthroid 112 mcg daily. Obesity- Working on weight loss.          Past Medical History:   Diagnosis Date    Abdominal pain     Abnormal stools     Acute kidney injury superimposed on CKD ([address] 10/24/2022    Celiac disease 2019    Chronic lymphocytic thyroiditis     Delivery by emergency  section 2022    Diabetic ketoacidosis ([address]     Hyperopia with astigmatism     Hypothyroid     Insomnia     Migraine     Precocious adrenarche ([address]     Precocious puberty      delivery     Rh incompatibility     Seasonal allergies     Seizures ([address]     D/T BS out of control, no seizure disorder    Type 1 diabetes mellitus without (mention of) complication     date of diagnosis - 2/15/2010     Family History   Problem Relation Age of Onset    Diabetes Father         borderline    Neuropathy Father     Breast Cancer Maternal Grandmother         HRS2    Diabetes Maternal Grandfather     Retinal Detachment Maternal Grandfather     Heart Disease Paternal Grandmother     Asthma Paternal Grandmother     Other Paternal Grandmother         edema and history of hemophilia    Hypertension Other         paternal side    Diabetes Other         paternal side     Glaucoma Neg Hx Cataracts Neg Hx      Social History     Tobacco Use    Smoking status: Never     Passive exposure: Yes    Smokeless tobacco: Never    Tobacco comments:     Passive smoke exposure.    Vaping Use    Vaping Use: Never used   Substance Use Topics    Alcohol use: Yes     Comment: rare    Drug use: Not Currently     Types: Marijuana Oneida Coulter)     Comment: last use 6/26, stopped     Allergies   Allergen Reactions    Oxycodone Hives and Itching    Gluten Meal     Insulin Detemir Other (See Comments), Swelling and Hives     Local reaction  Local reaction    Other Other (See Comments)     flouride  Causes hives  flouride  Causes hives    Codeine Hives, Nausea And Vomiting and Rash     Other reaction(s): Vomiting       MEDICATIONS:  Current Outpatient Medications   Medication Sig Dispense Refill    fluconazole (DIFLUCAN) 150 MG tablet Take one pill now, take second pill 5 days later 2 tablet 0    insulin lispro, 1 Unit Dial, (HUMALOG KWIKPEN) 100 UNIT/ML SOPN For use with omnipod max daily dose 90 units 30 mL 1    levothyroxine (SYNTHROID) 112 MCG tablet Take 1 tablet by mouth daily 30 tablet 3    Insulin Degludec (TRESIBA FLEXTOUCH) 200 UNIT/ML SOPN Inject 50 Units into the skin daily 7 Adjustable Dose Pre-filled Pen Syringe 0    ondansetron (ZOFRAN-ODT) 4 MG disintegrating tablet Take 1 tablet by mouth 3 times daily as needed for Nausea or Vomiting 21 tablet 0    vitamin D (CHOLECALCIFEROL) 25 MCG (1000 UT) TABS tablet Take 1,000 Units by mouth daily      norethindrone (JOLEEN) 0.35 MG tablet Take 1 tablet by mouth daily 28 tablet 11    cetirizine (ZYRTEC) 10 MG tablet TAKE ONE TABLET BY MOUTH EVERY DAY 30 tablet 6    Insulin Disposable Pump (OMNIPOD 5 G6 INTRO, GEN 5,) KIT USE AS DIRECTED (Patient not taking: Reported on 12/14/2022) 1 kit 0    Insulin Disposable Pump (OMNIPOD 5 G6 POD, GEN 5,) MISC 1 each by Does not apply route every 3 days (Patient not taking: No sig reported) 10 each 5    blood glucose test strips (TRUETEST TEST) strip Use to test blood sugars 5 times daily 200 each 0    Continuous Blood Gluc Transmit (DEXCOM G6 TRANSMITTER) MISC Use daily to check blood sugars. Change every 90 days. 1 each 3    Continuous Blood Gluc Sensor (DEXCOM G6 SENSOR) MISC Use daily to check blood sugars. Change every 10 days. 9 each 3    Lancets (BD LANCET ULTRAFINE 30G) MISC Use to test blood sugar 5 times daily 200 each 11    Insulin Pen Needle (B-D ULTRAFINE III SHORT PEN) 31G X 8 MM MISC Use to inject insulin 6 times daily 200 each 6    acetone, urine, test (KETOSTIX) strip Test daily as needed for ketones 50 strip 2    BAQSIMI ONE PACK 3 MG/DOSE POWD       B-D INS SYR ULTRAFINE 1CC/31G 31G X 5/16\" 1 ML MISC       ULTRA-COMFORT INSULIN SYRINGE 31G X 5/16\" 0.5 ML MISC USE ONCE DAILY WITH LANTUS 30 each 3    glucagon (GLUCAGON EMERGENCY) 1 MG injection As directed for extreme hypoglycemia 1 kit 2     No current facility-administered medications for this visit. Review ofSymptoms:  Review of Systems   Constitutional:  Positive for fatigue. Negative for unexpected weight change. Eyes:  Negative for visual disturbance. Respiratory: Negative. Negative for shortness of breath. Cardiovascular:  Negative for chest pain and leg swelling. Gastrointestinal:  Negative for abdominal pain and diarrhea. Endocrine: Negative for polydipsia, polyphagia and polyuria. Genitourinary: Negative. Musculoskeletal: Negative. Skin:  Negative for rash and wound. Neurological:  Negative for dizziness, tremors, seizures and headaches. Psychiatric/Behavioral: Negative. Negative for confusion and decreased concentration. Theremainder of a complete 14-point review of systems is negative.        Vital Signs: BP 98/72 (Site: Left Upper Arm, Position: Sitting, Cuff Size: Large Adult)   Pulse 88   Resp 14   Ht 5' 5.25\" (1.657 m)   Wt 171 lb (77.6 kg)   LMP 12/12/2022 (Exact Date)   BMI 28.24 kg/m²      Wt Readings from Last 3 Encounters:   12/14/22 171 lb (77.6 kg)   12/09/22 174 lb 9.6 oz (79.2 kg)   11/10/22 175 lb (79.4 kg)     Body mass index is 28.24 kg/m². LABS:  Hemoglobin A1C   Date Value Ref Range Status   10/24/2022 9.0 (H) 4.0 - 6.0 % Final   08/09/2022 10.2 (H) 4.0 - 6.0 % Final     Lab Results   Component Value Date    LABMICR 20 08/09/2022     Lab Results   Component Value Date     10/26/2022    K 3.9 10/26/2022     (H) 10/26/2022    CO2 19 (L) 10/26/2022    BUN 4 (L) 10/26/2022    CREATININE 0.51 10/26/2022    GLUCOSE 183 (H) 10/26/2022    CALCIUM 8.5 (L) 10/26/2022    PROT 7.5 06/30/2021    LABALBU 4.5 06/30/2021    BILITOT 0.60 06/30/2021    ALKPHOS 84 06/30/2021    AST 14 06/30/2021    ALT 11 06/30/2021    LABGLOM >60 10/26/2022    GFRAA >60 04/28/2022     Lab Results   Component Value Date    CHOL 214 (H) 04/28/2022    CHOL 124 09/01/2020    CHOL 174 07/25/2019     Lab Results   Component Value Date    TRIG 232 (H) 04/28/2022    TRIG 142 09/01/2020    TRIG 155 (H) 07/25/2019     Lab Results   Component Value Date    HDL 41 04/28/2022    HDL 31 (L) 09/01/2020    HDL 33 (L) 07/25/2019     Lab Results   Component Value Date    LDLCHOLESTEROL 127 04/28/2022    LDLCHOLESTEROL 65 09/01/2020    LDLCHOLESTEROL 110 07/25/2019     Lab Results   Component Value Date    VLDL NOT REPORTED 09/01/2020    VLDL NOT REPORTED (H) 07/25/2019    VLDL NOT REPORTED (H) 02/15/2019     Lab Results   Component Value Date    CHOLHDLRATIO 5.2 (H) 04/28/2022    CHOLHDLRATIO 4.0 09/01/2020    CHOLHDLRATIO 5.3 (H) 07/25/2019           Physical Exam  Constitutional:       Appearance: She is well-developed. Eyes:      Pupils: Pupils are equal, round, and reactive to light. Neck:      Thyroid: No thyroid mass, thyromegaly or thyroid tenderness. Cardiovascular:      Rate and Rhythm: Normal rate and regular rhythm. Heart sounds: Normal heart sounds.    Pulmonary:      Effort: Pulmonary effort is normal.      Breath sounds: Normal breath sounds. Abdominal:      General: Bowel sounds are normal.      Palpations: Abdomen is soft. Skin:     General: Skin is warm and dry. Comments: Negative for open/nonhealing wounds. Negative for lipohypertrophy. Neurological:      Mental Status: She is alert and oriented to person, place, and time. ASSESSMENT/PLAN:     Diagnosis Orders   1. Type 1 diabetes mellitus without complication ([facility]        2. Hypothyroidism, unspecified type          No orders of the defined types were placed in this encounter. No orders of the defined types were placed in this encounter. Requested Prescriptions      No prescriptions requested or ordered in this encounter       1. Type 1 diabetes mellitus without complication (HCC)  - Unstable  HbA1C goal is less than 7%. - Fasting blood glucose goal is 70-120mg/dl and postprandial blood sugar goal is less than 180 mg/dl. - Labs reviewed including most recent A1c, UACR and kidney function. Repeat labs due in 3 months.    -We discussed in great detail dietary modifications they can make to better improve their blood sugars. -follow up diabetes education completed, all questions answered. CGM report downloaded and reviewed from the past 2 weeks scanned to media tab. Time in range 28%, 69% hyperglycemia, and hypoglycemia 1%. Predicted A1c per CGM report 9.0% and average glucose 238 mg/dl.   -she will get omnipod set up in January and follow one month after,   -4 oz of juice only for hypos. Discussed signs and symptoms of hyper/hypoglycemia and how to treat. Encouraged 150 minutes of physical activity per week. Follow a low carbohydrate diet. Encouraged at least 7 hours of sleep. The patient was informed of the goals of diabetes management. This can only be accomplished by watching their diet and exercise levels. We certainly use medicines to help attain these goals. The consequences of not controlling blood sugars were discussed.  These include blindness, heart disease, stroke, kidney disease, and possibly need for dialysis. They were told to be careful with their foot care as diabetics often have nerve damage, infections and risk for limb amutations . They also need a dilated eye exam yearly. We discussed the issues of diet, exercise, medication, complication avoidance, reviewed the signs and symptoms of diabetes, hypoglycemic episodes, significance of HbA1C.       2. Hypothyroidism, unspecified type  -stable no changes   -repeat tsh in 3 montsh           Answered all patient questions. Agrees to follow plan of care and to follow up in 3 months, sooner if needed. Call office if unexplained blood sugars less than 70 occur or above 400. Call office or access [facility] with any further questions or concerns. Be sure to bring glucometer/food log at next appointment. Total time spent reviewing chart, labs, counseling patient and documenting on the date of the encounter: 30 min.       Electronically signed by MARIA D Sanz CNP on 12/14/2022 at 11:39 AM      (Please note that portions of this note were completed with a voice-recognition program. Efforts were made to edit the dictation but occasionally words are mis-transcribed.)

## 2023-01-03 RX ORDER — NORETHINDRONE 0.35 MG/1
TABLET ORAL
Qty: 28 TABLET | Refills: 11 | Status: SHIPPED | OUTPATIENT
Start: 2023-01-03

## 2023-01-03 NOTE — TELEPHONE ENCOUNTER
Dayana Rather called requesting a refill of the below medication which has been pended for you:     Requested Prescriptions     Pending Prescriptions Disp Refills    JENCYCLA 0.35 MG tablet [Pharmacy Med Name: Jorge Luis Garzon 0.35 MG TABLET] 28 tablet 11     Sig: TAKE ONE TABLET BY MOUTH DAILY       Last Appointment Date: 11/1/2022  Next Appointment Date: 9/14/2023    Allergies   Allergen Reactions    Oxycodone Hives and Itching    Gluten Meal     Insulin Detemir Other (See Comments), Swelling and Hives     Local reaction  Local reaction    Other Other (See Comments)     flouride  Causes hives  flouride  Causes hives    Codeine Hives, Nausea And Vomiting and Rash     Other reaction(s): Vomiting

## 2023-02-22 ENCOUNTER — TELEPHONE (OUTPATIENT)
Dept: INTERNAL MEDICINE | Age: 23
End: 2023-02-22

## 2023-02-22 NOTE — TELEPHONE ENCOUNTER
Received the following secure email from [address] [place] [address], [facility]. I had a Denise Thurmond scheduled today in your office for training at 11 am but I had to cancel due to the weather and road conditions, since I drive from [place]. I was going to reschedule her for tomorrow but she can not meet tomorrow. I just rescheduled Jared Dale for Friday at 1:30 pm. In your office.

## 2023-03-14 ENCOUNTER — HOSPITAL ENCOUNTER (OUTPATIENT)
Age: 23
Discharge: HOME | End: 2023-03-14
Payer: COMMERCIAL

## 2023-03-14 ENCOUNTER — OFFICE VISIT (OUTPATIENT)
Dept: DIABETES SERVICES | Age: 23
End: 2023-03-14
Payer: COMMERCIAL

## 2023-03-14 VITALS
DIASTOLIC BLOOD PRESSURE: 60 MMHG | HEART RATE: 84 BPM | SYSTOLIC BLOOD PRESSURE: 110 MMHG | WEIGHT: 166 LBS | RESPIRATION RATE: 16 BRPM | BODY MASS INDEX: 27.66 KG/M2 | HEIGHT: 65 IN

## 2023-03-14 DIAGNOSIS — E10.9 TYPE 1 DIABETES MELLITUS WITHOUT COMPLICATION (HCC): ICD-10-CM

## 2023-03-14 DIAGNOSIS — E03.9 HYPOTHYROIDISM, UNSPECIFIED TYPE: ICD-10-CM

## 2023-03-14 DIAGNOSIS — E10.9 TYPE 1 DIABETES MELLITUS WITHOUT COMPLICATION (HCC): Primary | ICD-10-CM

## 2023-03-14 LAB — TSH SERPL-ACNC: 1.48 UIU/ML (ref 0.3–5)

## 2023-03-14 PROCEDURE — G8419 CALC BMI OUT NRM PARAM NOF/U: HCPCS | Performed by: NURSE PRACTITIONER

## 2023-03-14 PROCEDURE — 1036F TOBACCO NON-USER: CPT | Performed by: NURSE PRACTITIONER

## 2023-03-14 PROCEDURE — 95251 CONT GLUC MNTR ANALYSIS I&R: CPT | Performed by: NURSE PRACTITIONER

## 2023-03-14 PROCEDURE — G8484 FLU IMMUNIZE NO ADMIN: HCPCS | Performed by: NURSE PRACTITIONER

## 2023-03-14 PROCEDURE — 99214 OFFICE O/P EST MOD 30 MIN: CPT

## 2023-03-14 PROCEDURE — 2022F DILAT RTA XM EVC RTNOPTHY: CPT | Performed by: NURSE PRACTITIONER

## 2023-03-14 PROCEDURE — 83036 HEMOGLOBIN GLYCOSYLATED A1C: CPT

## 2023-03-14 PROCEDURE — 84443 ASSAY THYROID STIM HORMONE: CPT

## 2023-03-14 PROCEDURE — 99214 OFFICE O/P EST MOD 30 MIN: CPT | Performed by: NURSE PRACTITIONER

## 2023-03-14 PROCEDURE — 36415 COLL VENOUS BLD VENIPUNCTURE: CPT

## 2023-03-14 PROCEDURE — G8427 DOCREV CUR MEDS BY ELIG CLIN: HCPCS | Performed by: NURSE PRACTITIONER

## 2023-03-14 PROCEDURE — 3046F HEMOGLOBIN A1C LEVEL >9.0%: CPT | Performed by: NURSE PRACTITIONER

## 2023-03-14 RX ORDER — LEVOTHYROXINE SODIUM 112 UG/1
112 TABLET ORAL DAILY
Qty: 30 TABLET | Refills: 3 | Status: SHIPPED | OUTPATIENT
Start: 2023-03-14

## 2023-03-14 ASSESSMENT — ENCOUNTER SYMPTOMS
ABDOMINAL PAIN: 0
RESPIRATORY NEGATIVE: 1
SHORTNESS OF BREATH: 0
DIARRHEA: 0

## 2023-03-14 NOTE — PROGRESS NOTES
[address], [address]  [facility] [id number] [phone]        HISTORY:    Maxine Kessler presents today for evaluation and management of:  Chief Complaint   Patient presents with    Diabetes    3 Month Follow-Up       Patient Care Team:  Antonina Be DO as PCP - General (Family Medicine)  Antonina Be DO as PCP - Empaneled Provider      Interval History:    Past DM Medications   MDI    Current Diabetic Medications  Humalog for use with omnipod TDD 50 units     DKA episodes: 2009 DKA with diagnosis after H1N1 vaccine. 2012 DKA unknown cause. 2022 DKA due to illness    12/14/22   Maxine Kessler is a 25 y.o. female patient who presents to clinic today for her diabetes. she has a history of wide excursions, hypothyroidism and celiac disease  which contributes to her diabetes. At previous visit omnipod was ordered she has received supplies but has not set up traning. she denies any current signs or symptoms of hyper/hypoglycemia. she states they are taking their medications as prescribed without any adverse effects. Diet: carb counting  Exercise: none  BS testing: uses cgm daily with success and is adherent to cgm therapy  Hypoglycemia: Yes    Sweats and Tremors                 Hypoglycemia Frequency: 1 per day  Hypoglycemia as needed treatment: juice, snack, emergency glucagon   Issues: denies   Diabetic foot exam up-to-date: Yes  Diabetic retinal exam up-to-date: No     Diabetes complications:none    93/76/95   Maxine Kessler is a 25 y.o. female patient who presents to clinic today for her diabetes. she has a history of wide excursions, hypothyroidism and celiac disease  which contributes to her diabetes. At previous visit omnipod was encouraged. She had omnipod training a few weeks ago and feels numbers are improving. she denies any current signs or symptoms of hyper/hypoglycemia.  she states they are taking their medications as prescribed without any adverse effects. Diet: counting carbs 30-40 per meal  Exercise: none- started a job at [employer] testing: uses cgm daily with success and is adherent to cgm therapy  Hypoglycemia: No  Hypoglycemia as needed treatment: snack, juice or emergency glucagon  Issues:   Diabetic foot exam up-to-date: Yes  Diabetic retinal exam up-to-date: No    Diabetes complications:none    Hypothyroidism-  Denies excessive fatigue, change in weight, cold or heat intolerance, mood changes, weakness, tremor, constipation, diarrhea, neck pain, change in voice, sleep issues or changes in hair. Taking synthroid 112 mcg daily.           Past Medical History:   Diagnosis Date    Abdominal pain     Abnormal stools     Acute kidney injury superimposed on CKD ([address] 10/24/2022    Celiac disease 2019    Chronic lymphocytic thyroiditis     Delivery by emergency  section 2022    Diabetic ketoacidosis ([address]     Hyperopia with astigmatism     Hypothyroid     Insomnia     Migraine     Precocious adrenarche ([address]     Precocious puberty      delivery     Rh incompatibility     Seasonal allergies     Seizures ([address]     D/T BS out of control, no seizure disorder    Type 1 diabetes mellitus without (mention of) complication     date of diagnosis - 2/15/2010     Family History   Problem Relation Age of Onset    Diabetes Father         borderline    Neuropathy Father     Breast Cancer Maternal Grandmother         HRS2    Diabetes Maternal Grandfather     Retinal Detachment Maternal Grandfather     Heart Disease Paternal Grandmother     Asthma Paternal Grandmother     Other Paternal Grandmother         edema and history of hemophilia    Hypertension Other         paternal side    Diabetes Other         paternal side     Glaucoma Neg Hx     Cataracts Neg Hx      Social History     Tobacco Use    Smoking status: Never     Passive exposure: Yes    Smokeless tobacco: Never Tobacco comments:     Passive smoke exposure. Vaping Use    Vaping Use: Never used   Substance Use Topics    Alcohol use: Yes     Comment: rare    Drug use: Not Currently     Types: Marijuana [place])     Comment: last use 6/26, stopped     Allergies   Allergen Reactions    Oxycodone Hives and Itching    Gluten Meal     Insulin Detemir Other (See Comments), Swelling and Hives     Local reaction  Local reaction    Other Other (See Comments)     flouride  Causes hives  flouride  Causes hives    Codeine Hives, Nausea And Vomiting and Rash     Other reaction(s): Vomiting       MEDICATIONS:  Current Outpatient Medications   Medication Sig Dispense Refill    levothyroxine (SYNTHROID) 112 MCG tablet Take 1 tablet by mouth daily 30 tablet 3    JENCYCLA 0.35 MG tablet TAKE ONE TABLET BY MOUTH DAILY 28 tablet 11    insulin lispro, 1 Unit Dial, (HUMALOG KWIKPEN) 100 UNIT/ML SOPN For use with omnipod max daily dose 90 units 30 mL 1    Insulin Disposable Pump (OMNIPOD 5 G6 POD, GEN 5,) MISC 1 each by Does not apply route every 3 days 10 each 5    ondansetron (ZOFRAN-ODT) 4 MG disintegrating tablet Take 1 tablet by mouth 3 times daily as needed for Nausea or Vomiting 21 tablet 0    vitamin D (CHOLECALCIFEROL) 25 MCG (1000 UT) TABS tablet Take 1,000 Units by mouth daily      cetirizine (ZYRTEC) 10 MG tablet TAKE ONE TABLET BY MOUTH EVERY DAY 30 tablet 6    blood glucose test strips (TRUETEST TEST) strip Use to test blood sugars 5 times daily 200 each 0    Insulin Degludec (TRESIBA FLEXTOUCH) 200 UNIT/ML SOPN Inject 50 Units into the skin daily (Patient not taking: Reported on 3/14/2023) 7 Adjustable Dose Pre-filled Pen Syringe 0    Continuous Blood Gluc Transmit (DEXCOM G6 TRANSMITTER) MISC Use daily to check blood sugars. Change every 90 days. 1 each 3    Continuous Blood Gluc Sensor (DEXCOM G6 SENSOR) MISC Use daily to check blood sugars. Change every 10 days.  9 each 3    Lancets (BD LANCET ULTRAFINE 30G) MISC Use to test blood sugar 5 times daily 200 each 11    Insulin Pen Needle (B-D ULTRAFINE III SHORT PEN) 31G X 8 MM MISC Use to inject insulin 6 times daily 200 each 6    acetone, urine, test (KETOSTIX) strip Test daily as needed for ketones 50 strip 2    BAQSIMI ONE PACK 3 MG/DOSE POWD       B-D INS SYR ULTRAFINE 1CC/31G 31G X 5/16\" 1 ML MISC       ULTRA-COMFORT INSULIN SYRINGE 31G X 5/16\" 0.5 ML MISC USE ONCE DAILY WITH LANTUS 30 each 3    glucagon (GLUCAGON EMERGENCY) 1 MG injection As directed for extreme hypoglycemia 1 kit 2     No current facility-administered medications for this visit. Review ofSymptoms:  Review of Systems   Constitutional:  Positive for fatigue. Negative for unexpected weight change. Eyes:  Negative for visual disturbance. Respiratory: Negative. Negative for shortness of breath. Cardiovascular:  Negative for chest pain and leg swelling. Gastrointestinal:  Negative for abdominal pain and diarrhea. Endocrine: Negative for polydipsia, polyphagia and polyuria. Genitourinary: Negative. Musculoskeletal: Negative. Skin:  Negative for rash and wound. Neurological:  Negative for dizziness, tremors, seizures and headaches. Psychiatric/Behavioral: Negative. Negative for confusion and decreased concentration. Theremainder of a complete 14-point review of systems is negative. Vital Signs: /60 (Site: Right Upper Arm, Position: Sitting, Cuff Size: Medium Adult)   Pulse 84   Resp 16   Ht 5' 5.25\" (1.657 m)   Wt 166 lb (75.3 kg)   LMP 03/07/2023   Breastfeeding No   BMI 27.41 kg/m²      Wt Readings from Last 3 Encounters:   03/14/23 166 lb (75.3 kg)   12/14/22 171 lb (77.6 kg)   12/09/22 174 lb 9.6 oz (79.2 kg)     Body mass index is 27.41 kg/m².   LABS:  Hemoglobin A1C   Date Value Ref Range Status   10/24/2022 9.0 (H) 4.0 - 6.0 % Final   08/09/2022 10.2 (H) 4.0 - 6.0 % Final     Lab Results   Component Value Date    LABMICR 20 08/09/2022     Lab Results   Component Value Date     10/26/2022    K 3.9 10/26/2022     (H) 10/26/2022    CO2 19 (L) 10/26/2022    BUN 4 (L) 10/26/2022    CREATININE 0.51 10/26/2022    GLUCOSE 183 (H) 10/26/2022    CALCIUM 8.5 (L) 10/26/2022    PROT 7.5 06/30/2021    LABALBU 4.5 06/30/2021    BILITOT 0.60 06/30/2021    ALKPHOS 84 06/30/2021    AST 14 06/30/2021    ALT 11 06/30/2021    LABGLOM >60 10/26/2022    GFRAA >60 04/28/2022     Lab Results   Component Value Date    CHOL 214 (H) 04/28/2022    CHOL 124 09/01/2020    CHOL 174 07/25/2019     Lab Results   Component Value Date    TRIG 232 (H) 04/28/2022    TRIG 142 09/01/2020    TRIG 155 (H) 07/25/2019     Lab Results   Component Value Date    HDL 41 04/28/2022    HDL 31 (L) 09/01/2020    HDL 33 (L) 07/25/2019     Lab Results   Component Value Date    LDLCHOLESTEROL 127 04/28/2022    LDLCHOLESTEROL 65 09/01/2020    LDLCHOLESTEROL 110 07/25/2019     Lab Results   Component Value Date    VLDL NOT REPORTED 09/01/2020    VLDL NOT REPORTED (H) 07/25/2019    VLDL NOT REPORTED (H) 02/15/2019     Lab Results   Component Value Date    CHOLHDLRATIO 5.2 (H) 04/28/2022    CHOLHDLRATIO 4.0 09/01/2020    CHOLHDLRATIO 5.3 (H) 07/25/2019           Physical Exam  Constitutional:       Appearance: She is well-developed. Eyes:      Pupils: Pupils are equal, round, and reactive to light. Neck:      Thyroid: No thyroid mass, thyromegaly or thyroid tenderness. Cardiovascular:      Rate and Rhythm: Normal rate and regular rhythm. Heart sounds: Normal heart sounds. Pulmonary:      Effort: Pulmonary effort is normal.      Breath sounds: Normal breath sounds. Abdominal:      General: Bowel sounds are normal.      Palpations: Abdomen is soft. Skin:     General: Skin is warm and dry. Comments: Negative for open/nonhealing wounds. Negative for lipohypertrophy. Neurological:      Mental Status: She is alert and oriented to person, place, and time. ASSESSMENT/PLAN:     Diagnosis Orders   1. Type 1 diabetes mellitus without complication (HCC)  Hemoglobin A1C      2. Hypothyroidism, unspecified type  levothyroxine (SYNTHROID) 112 MCG tablet        Orders Placed This Encounter   Procedures    Hemoglobin A1C     Orders Placed This Encounter   Medications    levothyroxine (SYNTHROID) 112 MCG tablet     Sig: Take 1 tablet by mouth daily     Dispense:  30 tablet     Refill:  3     Requested Prescriptions     Signed Prescriptions Disp Refills    levothyroxine (SYNTHROID) 112 MCG tablet 30 tablet 3     Sig: Take 1 tablet by mouth daily       1. Type 1 diabetes mellitus without complication (HCC)  - Unstable  HbA1C goal is less than 7%. - Fasting blood glucose goal is 70-120mg/dl and postprandial blood sugar goal is less than 180 mg/dl. - Labs reviewed including most recent A1c, UACR and kidney function. Repeat labs due in 3 months.    -We discussed in great detail dietary modifications they can make to better improve their blood sugars. -follow up diabetes education completed, all questions answered.  -labs pending,   CGM report downloaded and reviewed from the past 2 weeks scanned to media tab. Time in range 48%, 52% hyperglycemia, and hypoglycemia 0%. Predicted A1c per CGM report 8% and average glucose 196 mg/dl. Insulin pump settings downloaded and reviewed. Scanned into media tab.   -significant improvement in glucose control in the past month while on omnipod. No pump changes as she is still in the learning phase of omnipod. Discussed signs and symptoms of hyper/hypoglycemia and how to treat. Encouraged 150 minutes of physical activity per week. Follow a low carbohydrate diet. Encouraged at least 7 hours of sleep. The patient was informed of the goals of diabetes management. This can only be accomplished by watching their diet and exercise levels. We certainly use medicines to help attain these goals. The consequences of not controlling blood sugars were discussed.  These include blindness, heart disease, stroke, kidney disease, and possibly need for dialysis. They were told to be careful with their foot care as diabetics often have nerve damage, infections and risk for limb amutations . They also need a dilated eye exam yearly. We discussed the issues of diet, exercise, medication, complication avoidance, reviewed the signs and symptoms of diabetes, hypoglycemic episodes, significance of HbA1C.     - Hemoglobin A1C; Future    2. Hypothyroidism, unspecified type  -stable on current dose of synthroid. Tsh pending.   - levothyroxine (SYNTHROID) 112 MCG tablet; Take 1 tablet by mouth daily  Dispense: 30 tablet; Refill: 3            Answered all patient questions. Agrees to follow plan of care and to follow up in 3 months, sooner if needed. Call office if unexplained blood sugars less than 70 occur or above 400. Call office or access MyChart with any further questions or concerns. Be sure to bring glucometer/food log at next appointment. Total time spent reviewing chart, labs, counseling patient and documenting on the date of the encounter: 30 min.       Electronically signed by MARIA D Ray CNP on 3/14/2023 at 12:04 PM      (Please note that portions of this note were completed with a voice-recognition program. Efforts were made to edit the dictation but occasionally words are mis-transcribed.)

## 2023-03-15 LAB
EST. AVERAGE GLUCOSE BLD GHB EST-MCNC: 186 MG/DL
HBA1C MFR BLD: 8.1 % (ref 4–6)

## 2023-04-28 DIAGNOSIS — E10.9 TYPE 1 DIABETES MELLITUS WITHOUT COMPLICATION (HCC): ICD-10-CM

## 2023-05-01 RX ORDER — INSULIN PMP CART,AUT,G6/7,CNTR
EACH SUBCUTANEOUS
Qty: 1 KIT | Refills: 0 | Status: SHIPPED | OUTPATIENT
Start: 2023-05-01

## 2023-05-02 RX ORDER — INSULIN PMP CART,AUT,G6/7,CNTR
1 EACH SUBCUTANEOUS
Qty: 10 EACH | Refills: 5 | Status: SHIPPED | OUTPATIENT
Start: 2023-05-02

## 2023-05-17 ENCOUNTER — OFFICE VISIT (OUTPATIENT)
Dept: FAMILY MEDICINE CLINIC | Age: 23
End: 2023-05-17
Payer: COMMERCIAL

## 2023-05-17 VITALS
SYSTOLIC BLOOD PRESSURE: 112 MMHG | HEART RATE: 80 BPM | WEIGHT: 160 LBS | DIASTOLIC BLOOD PRESSURE: 70 MMHG | BODY MASS INDEX: 26.66 KG/M2 | TEMPERATURE: 98.2 F | OXYGEN SATURATION: 98 % | RESPIRATION RATE: 18 BRPM | HEIGHT: 65 IN

## 2023-05-17 DIAGNOSIS — K04.7 DENTAL INFECTION: Primary | ICD-10-CM

## 2023-05-17 DIAGNOSIS — F43.21 GRIEF REACTION: ICD-10-CM

## 2023-05-17 DIAGNOSIS — J30.1 SEASONAL ALLERGIC RHINITIS DUE TO POLLEN: ICD-10-CM

## 2023-05-17 PROCEDURE — 1036F TOBACCO NON-USER: CPT | Performed by: FAMILY MEDICINE

## 2023-05-17 PROCEDURE — G8419 CALC BMI OUT NRM PARAM NOF/U: HCPCS | Performed by: FAMILY MEDICINE

## 2023-05-17 PROCEDURE — 99214 OFFICE O/P EST MOD 30 MIN: CPT | Performed by: FAMILY MEDICINE

## 2023-05-17 PROCEDURE — G8427 DOCREV CUR MEDS BY ELIG CLIN: HCPCS | Performed by: FAMILY MEDICINE

## 2023-05-17 PROCEDURE — 99213 OFFICE O/P EST LOW 20 MIN: CPT | Performed by: FAMILY MEDICINE

## 2023-05-17 RX ORDER — AMOXICILLIN AND CLAVULANATE POTASSIUM 875; 125 MG/1; MG/1
1 TABLET, FILM COATED ORAL 2 TIMES DAILY
Qty: 20 TABLET | Refills: 0 | Status: SHIPPED | OUTPATIENT
Start: 2023-05-17 | End: 2023-05-27

## 2023-05-17 RX ORDER — CETIRIZINE HYDROCHLORIDE 10 MG/1
TABLET ORAL
Qty: 30 TABLET | Refills: 6 | Status: SHIPPED | OUTPATIENT
Start: 2023-05-17

## 2023-05-17 SDOH — ECONOMIC STABILITY: HOUSING INSECURITY
IN THE LAST 12 MONTHS, WAS THERE A TIME WHEN YOU DID NOT HAVE A STEADY PLACE TO SLEEP OR SLEPT IN A SHELTER (INCLUDING NOW)?: NO

## 2023-05-17 SDOH — ECONOMIC STABILITY: FOOD INSECURITY: WITHIN THE PAST 12 MONTHS, YOU WORRIED THAT YOUR FOOD WOULD RUN OUT BEFORE YOU GOT MONEY TO BUY MORE.: NEVER TRUE

## 2023-05-17 SDOH — ECONOMIC STABILITY: FOOD INSECURITY: WITHIN THE PAST 12 MONTHS, THE FOOD YOU BOUGHT JUST DIDN'T LAST AND YOU DIDN'T HAVE MONEY TO GET MORE.: NEVER TRUE

## 2023-05-17 SDOH — ECONOMIC STABILITY: INCOME INSECURITY: HOW HARD IS IT FOR YOU TO PAY FOR THE VERY BASICS LIKE FOOD, HOUSING, MEDICAL CARE, AND HEATING?: NOT VERY HARD

## 2023-05-17 ASSESSMENT — ENCOUNTER SYMPTOMS
SINUS PRESSURE: 1
EYE REDNESS: 0
SINUS PAIN: 1
WHEEZING: 0
CONSTIPATION: 0
SHORTNESS OF BREATH: 0
NAUSEA: 0
COUGH: 0
DIARRHEA: 0
TROUBLE SWALLOWING: 0
ABDOMINAL PAIN: 0
RHINORRHEA: 1
FACIAL SWELLING: 0
SORE THROAT: 0
EYE DISCHARGE: 0
VOMITING: 0

## 2023-05-17 ASSESSMENT — PATIENT HEALTH QUESTIONNAIRE - PHQ9
SUM OF ALL RESPONSES TO PHQ QUESTIONS 1-9: 0
1. LITTLE INTEREST OR PLEASURE IN DOING THINGS: 0
SUM OF ALL RESPONSES TO PHQ QUESTIONS 1-9: 0
SUM OF ALL RESPONSES TO PHQ9 QUESTIONS 1 & 2: 0
SUM OF ALL RESPONSES TO PHQ QUESTIONS 1-9: 0
2. FEELING DOWN, DEPRESSED OR HOPELESS: 0
SUM OF ALL RESPONSES TO PHQ QUESTIONS 1-9: 0

## 2023-05-17 NOTE — PROGRESS NOTES
on file. An electronic signature was used to authenticate this note.     --Giana Tidwell, DO on 5/17/2023 at 6:58 AM

## 2023-06-01 ENCOUNTER — OFFICE VISIT (OUTPATIENT)
Dept: DIABETES SERVICES | Age: 23
End: 2023-06-01
Payer: COMMERCIAL

## 2023-06-01 VITALS
SYSTOLIC BLOOD PRESSURE: 102 MMHG | HEART RATE: 88 BPM | DIASTOLIC BLOOD PRESSURE: 80 MMHG | BODY MASS INDEX: 26.99 KG/M2 | WEIGHT: 162 LBS | HEIGHT: 65 IN | RESPIRATION RATE: 16 BRPM

## 2023-06-01 DIAGNOSIS — E03.9 HYPOTHYROIDISM, UNSPECIFIED TYPE: ICD-10-CM

## 2023-06-01 DIAGNOSIS — E10.9 TYPE 1 DIABETES MELLITUS WITHOUT COMPLICATION (HCC): Primary | ICD-10-CM

## 2023-06-01 PROCEDURE — 99214 OFFICE O/P EST MOD 30 MIN: CPT | Performed by: NURSE PRACTITIONER

## 2023-06-01 RX ORDER — PROCHLORPERAZINE 25 MG/1
SUPPOSITORY RECTAL
Qty: 3 EACH | Refills: 3 | Status: SHIPPED | OUTPATIENT
Start: 2023-06-01

## 2023-06-01 ASSESSMENT — ENCOUNTER SYMPTOMS
RESPIRATORY NEGATIVE: 1
SHORTNESS OF BREATH: 0
DIARRHEA: 0
ABDOMINAL PAIN: 0

## 2023-06-01 NOTE — PROGRESS NOTES
of physical activity per week. Follow a low carbohydrate diet. Encouraged at least 7 hours of sleep. The patient was informed of the goals of diabetes management. This can only be accomplished by watching their diet and exercise levels. We certainly use medicines to help attain these goals. The consequences of not controlling blood sugars were discussed. These include blindness, heart disease, stroke, kidney disease, and possibly need for dialysis. They were told to be careful with their foot care as diabetics often have nerve damage, infections and risk for limb amutations . They also need a dilated eye exam yearly. We discussed the issues of diet, exercise, medication, complication avoidance, reviewed the signs and symptoms of diabetes, hypoglycemic episodes, significance of HbA1C.     - Hemoglobin A1C; Future  - Lipid Panel; Future  - Continuous Blood Gluc Sensor (DEXCOM G6 SENSOR) MISC; Use once sensor every 10 days  Dispense: 3 each; Refill: 3    2. Hypothyroidism, unspecified type  Stable   - TSH With Reflex Ft4; Future            Answered all patient questions. Agrees to follow plan of care and to follow up in 3 months, sooner if needed. Call office if unexplained blood sugars less than 70 occur or above 400. Call office or access [employer] with any further questions or concerns. Be sure to bring glucometer/food log at next appointment. Total time spent reviewing chart, labs, counseling patient and documenting on the date of the encounter: 30 min.       Electronically signed by MARIA D Kimbrough CNP on 6/1/2023 at 2:06 PM      (Please note that portions of this note were completed with a voice-recognition program. Efforts were made to edit the dictation but occasionally words are mis-transcribed.)

## 2023-06-04 ENCOUNTER — OFFICE VISIT (OUTPATIENT)
Dept: PRIMARY CARE CLINIC | Age: 23
End: 2023-06-04

## 2023-06-04 VITALS
HEIGHT: 65 IN | BODY MASS INDEX: 26.49 KG/M2 | SYSTOLIC BLOOD PRESSURE: 112 MMHG | RESPIRATION RATE: 14 BRPM | HEART RATE: 86 BPM | OXYGEN SATURATION: 100 % | DIASTOLIC BLOOD PRESSURE: 78 MMHG | TEMPERATURE: 97.5 F | WEIGHT: 159 LBS

## 2023-06-04 DIAGNOSIS — J30.2 SEASONAL ALLERGIC RHINITIS, UNSPECIFIED TRIGGER: Primary | ICD-10-CM

## 2023-06-04 PROCEDURE — 99212 OFFICE O/P EST SF 10 MIN: CPT | Performed by: NURSE PRACTITIONER

## 2023-06-04 ASSESSMENT — ENCOUNTER SYMPTOMS
SINUS PRESSURE: 0
COUGH: 1
RHINORRHEA: 1
SHORTNESS OF BREATH: 0
WHEEZING: 0

## 2023-06-06 ENCOUNTER — OFFICE VISIT (OUTPATIENT)
Dept: PRIMARY CARE CLINIC | Age: 23
End: 2023-06-06
Payer: COMMERCIAL

## 2023-06-06 VITALS
DIASTOLIC BLOOD PRESSURE: 80 MMHG | SYSTOLIC BLOOD PRESSURE: 110 MMHG | OXYGEN SATURATION: 98 % | HEIGHT: 66 IN | BODY MASS INDEX: 26.1 KG/M2 | TEMPERATURE: 97.6 F | HEART RATE: 100 BPM | WEIGHT: 162.4 LBS

## 2023-06-06 DIAGNOSIS — J01.40 ACUTE NON-RECURRENT PANSINUSITIS: Primary | ICD-10-CM

## 2023-06-06 PROCEDURE — 99213 OFFICE O/P EST LOW 20 MIN: CPT | Performed by: FAMILY MEDICINE

## 2023-06-06 PROCEDURE — G8419 CALC BMI OUT NRM PARAM NOF/U: HCPCS | Performed by: FAMILY MEDICINE

## 2023-06-06 PROCEDURE — G8427 DOCREV CUR MEDS BY ELIG CLIN: HCPCS | Performed by: FAMILY MEDICINE

## 2023-06-06 PROCEDURE — 99214 OFFICE O/P EST MOD 30 MIN: CPT | Performed by: FAMILY MEDICINE

## 2023-06-06 PROCEDURE — 1036F TOBACCO NON-USER: CPT | Performed by: FAMILY MEDICINE

## 2023-06-06 RX ORDER — AMOXICILLIN AND CLAVULANATE POTASSIUM 875; 125 MG/1; MG/1
1 TABLET, FILM COATED ORAL 2 TIMES DAILY
Qty: 20 TABLET | Refills: 0 | Status: SHIPPED | OUTPATIENT
Start: 2023-06-06 | End: 2023-06-16

## 2023-06-06 ASSESSMENT — ENCOUNTER SYMPTOMS
ABDOMINAL PAIN: 0
EYE DISCHARGE: 0
SINUS COMPLAINT: 1
NAUSEA: 0
SORE THROAT: 1
RHINORRHEA: 1
TROUBLE SWALLOWING: 0
VOMITING: 0
CONSTIPATION: 0
SINUS PRESSURE: 1
WHEEZING: 0
COUGH: 1
SINUS PAIN: 1
EYE REDNESS: 0
SHORTNESS OF BREATH: 0
DIARRHEA: 0

## 2023-06-06 NOTE — PROGRESS NOTES
She is alert and oriented to person, place, and time. Psychiatric:         Behavior: Behavior normal.         Thought Content: Thought content normal.         Judgment: Judgment normal.       ASSESSMENT/PLAN:  Encounter Diagnosis   Name Primary? Acute non-recurrent pansinusitis Yes     Orders Placed This Encounter   Medications    amoxicillin-clavulanate (AUGMENTIN) 875-125 MG per tablet     Sig: Take 1 tablet by mouth 2 times daily for 10 days     Dispense:  20 tablet     Refill:  0     No orders of the defined types were placed in this encounter. Tylenol/Motrin prn    Increase fluids and rest    Can use mucinex or mucinex DM or comparable for congestion or cough. Return  if no improvement in symptoms or if any further symptoms arise. No follow-ups on file. An electronic signature was used to authenticate this note.     --Ivette Epley, DO on 6/6/2023 at 10:39 AM

## 2023-07-11 DIAGNOSIS — E03.9 HYPOTHYROIDISM, UNSPECIFIED TYPE: ICD-10-CM

## 2023-07-11 RX ORDER — LEVOTHYROXINE SODIUM 112 UG/1
112 TABLET ORAL DAILY
Qty: 30 TABLET | Refills: 1 | Status: SHIPPED | OUTPATIENT
Start: 2023-07-11

## 2023-07-11 NOTE — TELEPHONE ENCOUNTER
Ros Ibarra called requesting a refill of the below medication which has been pended for you:     Requested Prescriptions     Pending Prescriptions Disp Refills    levothyroxine (SYNTHROID) 112 MCG tablet 30 tablet 3     Sig: Take 1 tablet by mouth daily       Last Appointment Date: 6/6/2023  Next Appointment Date: 9/14/2023    Allergies   Allergen Reactions    Oxycodone Hives and Itching    Gluten Meal     Insulin Detemir Other (See Comments), Swelling and Hives     Local reaction  Local reaction    Other Other (See Comments)     flouride  Causes hives  flouride  Causes hives    Codeine Hives, Nausea And Vomiting and Rash     Other reaction(s): Vomiting

## 2023-07-14 ENCOUNTER — TELEPHONE (OUTPATIENT)
Dept: FAMILY MEDICINE CLINIC | Age: 23
End: 2023-07-14

## 2023-07-14 ENCOUNTER — TELEPHONE (OUTPATIENT)
Dept: INTERNAL MEDICINE | Age: 23
End: 2023-07-14

## 2023-07-14 NOTE — TELEPHONE ENCOUNTER
Spoke to [facility] [facility] and they stated the last refill was 6/15/23 #10/30 days.      They were able to process new refill for new disposable pumps with no problems and no cost.     LM for patient to return call

## 2023-07-14 NOTE — TELEPHONE ENCOUNTER
Patient called and states she is unable to get insulin disposable pumps from pharmacy. She states her insurance is stating she is unable to have them filled until September. Patient replaced pump every 3 days and has had to use a few more than normal due to defective pumps. These are typically prescribed by Robert Wiley, however she is out of the office. Writer tried to contact pharmacy to discuss and pharmacy is currently closed for lunch. Patient aware we will contact pharmacy and update patient.

## 2023-09-07 ENCOUNTER — OFFICE VISIT (OUTPATIENT)
Dept: DIABETES SERVICES | Age: 23
End: 2023-09-07
Payer: COMMERCIAL

## 2023-09-07 VITALS
HEIGHT: 65 IN | BODY MASS INDEX: 27.16 KG/M2 | DIASTOLIC BLOOD PRESSURE: 68 MMHG | HEART RATE: 100 BPM | SYSTOLIC BLOOD PRESSURE: 114 MMHG | OXYGEN SATURATION: 98 % | WEIGHT: 163 LBS

## 2023-09-07 DIAGNOSIS — E03.9 HYPOTHYROIDISM, UNSPECIFIED TYPE: ICD-10-CM

## 2023-09-07 DIAGNOSIS — E10.9 TYPE 1 DIABETES MELLITUS WITHOUT COMPLICATION (HCC): Primary | ICD-10-CM

## 2023-09-07 PROCEDURE — G8427 DOCREV CUR MEDS BY ELIG CLIN: HCPCS | Performed by: NURSE PRACTITIONER

## 2023-09-07 PROCEDURE — 95251 CONT GLUC MNTR ANALYSIS I&R: CPT | Performed by: NURSE PRACTITIONER

## 2023-09-07 PROCEDURE — 99212 OFFICE O/P EST SF 10 MIN: CPT | Performed by: NURSE PRACTITIONER

## 2023-09-07 PROCEDURE — 3052F HG A1C>EQUAL 8.0%<EQUAL 9.0%: CPT | Performed by: NURSE PRACTITIONER

## 2023-09-07 PROCEDURE — 99214 OFFICE O/P EST MOD 30 MIN: CPT | Performed by: NURSE PRACTITIONER

## 2023-09-07 PROCEDURE — 1036F TOBACCO NON-USER: CPT | Performed by: NURSE PRACTITIONER

## 2023-09-07 PROCEDURE — 2022F DILAT RTA XM EVC RTNOPTHY: CPT | Performed by: NURSE PRACTITIONER

## 2023-09-07 PROCEDURE — G8419 CALC BMI OUT NRM PARAM NOF/U: HCPCS | Performed by: NURSE PRACTITIONER

## 2023-09-07 ASSESSMENT — ENCOUNTER SYMPTOMS
ABDOMINAL PAIN: 0
DIARRHEA: 0
RESPIRATORY NEGATIVE: 1
SHORTNESS OF BREATH: 0

## 2023-09-07 NOTE — PROGRESS NOTES
[address] [address] [phone]        HISTORY:    Julián Harris presents today for evaluation and management of:  Chief Complaint   Patient presents with    Diabetes     3 month follow up       Patient Care Team:  Adelaida Perla DO as PCP - General (Family Medicine)  Adelaida Perla DO as PCP - Empaneled Provider      Interval History:    Past DM Medications   MDI     Current Diabetic Medications  Humalog for use with omnipod TDD 60 units     DKA episodes: 2009 DKA with diagnosis after H1N1 vaccine. 2012 DKA unknown cause. 2022 DKA due to illness       06/01/23   Julián Harris is a 25 y.o. female patient who presents to clinic today for her diabetes. she has a history of wide excursions, hypothyroidism and celiac disease which contributes to her diabetes. At previous visit diabetes counseling was provided. she denies any current signs or symptoms of hyper/hypoglycemia. she states they are taking their medications as prescribed without any adverse effects. She has been more stressed since her dad passed away last month. Diet: counting carbs 30-40 per meal  Exercise: none- started a job at [employer] testing: uses cgm daily with success and is adherent to cgm therapy  Hypoglycemia: No  Hypoglycemia as needed treatment: snack, juice or emergency glucagon  Issues:   Diabetic foot exam up-to-date: Yes  Diabetic retinal exam up-to-date: No     Diabetes complications:none    95/39/71   Julián Harris is a 25 y.o. female patient who presents to clinic today for her diabetes. she has a history of wide excursions, hypothyroidism and celiac disease  which contributes to her diabetes. At previous visit pump settings adjusted. she denies any current signs or symptoms of hyper/hypoglycemia. she states they are taking their medications as prescribed without any adverse effects.    Diet:

## 2023-09-15 DIAGNOSIS — E10.9 TYPE 1 DIABETES MELLITUS WITHOUT COMPLICATION (HCC): ICD-10-CM

## 2023-09-15 RX ORDER — INSULIN LISPRO 100 [IU]/ML
INJECTION, SOLUTION INTRAVENOUS; SUBCUTANEOUS
Qty: 30 ML | Refills: 1 | Status: CANCELLED | OUTPATIENT
Start: 2023-09-15

## 2023-09-15 NOTE — TELEPHONE ENCOUNTER
Deanna Cap called requesting a refill of the below medication which has been pended for you:     Requested Prescriptions     Pending Prescriptions Disp Refills    insulin lispro, 1 Unit Dial, (HUMALOG KWIKPEN) 100 UNIT/ML SOPN 30 mL 1     Sig: For use with omnipod max daily dose 90 units       Last Appointment Date: 9/7/2023  Next Appointment Date: 12/7/2023    Allergies   Allergen Reactions    Oxycodone Hives and Itching    Gluten Meal     Insulin Detemir Other (See Comments), Swelling and Hives     Local reaction  Local reaction    Other Other (See Comments)     flouride  Causes hives  flouride  Causes hives    Codeine Hives, Nausea And Vomiting and Rash     Other reaction(s): Vomiting

## 2023-09-18 RX ORDER — INSULIN LISPRO 100 [IU]/ML
INJECTION, SOLUTION INTRAVENOUS; SUBCUTANEOUS
Qty: 30 ML | Refills: 3 | Status: SHIPPED | OUTPATIENT
Start: 2023-09-18

## 2023-09-21 ENCOUNTER — OFFICE VISIT (OUTPATIENT)
Dept: FAMILY MEDICINE CLINIC | Age: 23
End: 2023-09-21
Payer: COMMERCIAL

## 2023-09-21 VITALS
OXYGEN SATURATION: 97 % | SYSTOLIC BLOOD PRESSURE: 110 MMHG | BODY MASS INDEX: 26.66 KG/M2 | HEART RATE: 88 BPM | TEMPERATURE: 97.5 F | WEIGHT: 160 LBS | DIASTOLIC BLOOD PRESSURE: 60 MMHG | RESPIRATION RATE: 18 BRPM | HEIGHT: 65 IN

## 2023-09-21 DIAGNOSIS — N94.6 DYSMENORRHEA: ICD-10-CM

## 2023-09-21 DIAGNOSIS — Z00.00 ROUTINE GENERAL MEDICAL EXAMINATION AT A HEALTH CARE FACILITY: Primary | ICD-10-CM

## 2023-09-21 DIAGNOSIS — E03.9 HYPOTHYROIDISM, UNSPECIFIED TYPE: ICD-10-CM

## 2023-09-21 DIAGNOSIS — E10.9 TYPE 1 DIABETES MELLITUS WITHOUT COMPLICATION (HCC): ICD-10-CM

## 2023-09-21 PROCEDURE — 99395 PREV VISIT EST AGE 18-39: CPT | Performed by: FAMILY MEDICINE

## 2023-09-21 RX ORDER — NORGESTIMATE AND ETHINYL ESTRADIOL 7DAYSX3 LO
1 KIT ORAL DAILY
Qty: 1 PACKET | Refills: 11 | Status: SHIPPED | OUTPATIENT
Start: 2023-09-21

## 2023-09-21 ASSESSMENT — ENCOUNTER SYMPTOMS
DIARRHEA: 0
RHINORRHEA: 0
EYE REDNESS: 0
SHORTNESS OF BREATH: 0
SORE THROAT: 0
SINUS PRESSURE: 0
TROUBLE SWALLOWING: 0
NAUSEA: 0
ABDOMINAL PAIN: 0
EYE DISCHARGE: 0
WHEEZING: 0
CONSTIPATION: 0
COUGH: 0
VOMITING: 0

## 2023-09-21 NOTE — PROGRESS NOTES
2023     Andre Diaz (:  2000) is a 25 y.o. female, here for evaluation of the following medical concerns:    HPI  Patient comes in today for  routine general physical exam and for follow up of her chronic health issues. Patient has been working with the diabetic educator regarding her diabetic management and is on an OmniPod with a Dexcom sensor and seems to be doing better. She did show me her current blood sugar readings and they are much more stabilized with the OmniPod usage she does have a known history of hypothyroidism and her thyroid levels are stable and adequately supplemented with her current thyroid dose. Patient otherwise tries to follow healthy dietary intake and maintain regular exercise as able to keep her blood sugars optimally controlled. Does continue on birth control pills in order to help with dysmenorrhea and this has kept her menstrual periods tolerable. She otherwise does not have any other acute medical concerns to discuss. .. Patient's recent lab reports are as follows:    Results for orders placed or performed during the hospital encounter of 23   Hemoglobin A1C   Result Value Ref Range    Hemoglobin A1C 8.1 (H) 4.0 - 6.0 %    Estimated Avg Glucose 186 mg/dL   TSH With Reflex Ft4   Result Value Ref Range    TSH 1.48 0.30 - 5.00 uIU/mL      Other review of systems are as noted below. Preventative measures are reviewed today. See health maintenance section for complete preventative plan of care. Did review patient's med list, allergies, social history, fam history, pmhx and pshx today as noted in the record. Review of Systems   Constitutional:  Negative for chills, fatigue and fever. HENT:  Negative for congestion, ear pain, postnasal drip, rhinorrhea, sinus pressure, sore throat and trouble swallowing. Eyes:  Negative for discharge and redness. Respiratory:  Negative for cough, shortness of breath and wheezing.     Cardiovascular:

## 2023-10-30 DIAGNOSIS — E03.9 HYPOTHYROIDISM, UNSPECIFIED TYPE: ICD-10-CM

## 2023-10-30 RX ORDER — LEVOTHYROXINE SODIUM 112 UG/1
112 TABLET ORAL DAILY
Qty: 30 TABLET | Refills: 1 | Status: SHIPPED | OUTPATIENT
Start: 2023-10-30

## 2023-10-30 NOTE — TELEPHONE ENCOUNTER
Toi Buchanan is requesting a refill on the following medication(s):  Requested Prescriptions     Pending Prescriptions Disp Refills    levothyroxine (SYNTHROID) 112 MCG tablet [Pharmacy Med Name: LEVOTHYROXINE 112 MCG TABLET] 30 tablet 1     Sig: TAKE 1 TABLET BY MOUTH DAILY       Last Visit Date (If Applicable):  7/34/6444    Next Visit Date:    9/24/24

## 2023-11-19 ENCOUNTER — HOSPITAL ENCOUNTER (EMERGENCY)
Age: 23
Discharge: HOME | End: 2023-11-19
Attending: EMERGENCY MEDICINE
Payer: COMMERCIAL

## 2023-11-19 VITALS
HEIGHT: 65 IN | RESPIRATION RATE: 16 BRPM | HEART RATE: 67 BPM | SYSTOLIC BLOOD PRESSURE: 111 MMHG | WEIGHT: 160 LBS | OXYGEN SATURATION: 100 % | DIASTOLIC BLOOD PRESSURE: 77 MMHG | BODY MASS INDEX: 26.66 KG/M2 | TEMPERATURE: 98.1 F

## 2023-11-19 DIAGNOSIS — R11.0 NAUSEA: Primary | ICD-10-CM

## 2023-11-19 LAB
ALBUMIN SERPL-MCNC: 4.3 G/DL (ref 3.5–5.2)
ALBUMIN/GLOB SERPL: 1.7 {RATIO} (ref 1–2.5)
ALP SERPL-CCNC: 64 U/L (ref 35–104)
ALT SERPL-CCNC: 14 U/L (ref 5–33)
AMORPH SED URNS QL MICRO: ABNORMAL
ANION GAP SERPL CALCULATED.3IONS-SCNC: 12 MMOL/L (ref 9–17)
AST SERPL-CCNC: 13 U/L
B-HCG SERPL EIA 3RD IS-ACNC: <1 MIU/ML
B-OH-BUTYR SERPL-MCNC: 0.16 MMOL/L (ref 0.02–0.27)
BACTERIA URNS QL MICRO: ABNORMAL
BASOPHILS # BLD: 0.07 K/UL (ref 0–0.2)
BASOPHILS NFR BLD: 1 % (ref 0–2)
BILIRUB DIRECT SERPL-MCNC: 0.1 MG/DL
BILIRUB INDIRECT SERPL-MCNC: 0.2 MG/DL (ref 0–1)
BILIRUB SERPL-MCNC: 0.3 MG/DL (ref 0.3–1.2)
BILIRUB UR QL STRIP: NEGATIVE
BUN SERPL-MCNC: 9 MG/DL (ref 6–20)
BUN/CREAT SERPL: 15 (ref 9–20)
CALCIUM SERPL-MCNC: 9.3 MG/DL (ref 8.6–10.4)
CHARACTER UR: ABNORMAL
CHLORIDE SERPL-SCNC: 102 MMOL/L (ref 98–107)
CLARITY UR: ABNORMAL
CO2 SERPL-SCNC: 25 MMOL/L (ref 20–31)
COLOR UR: YELLOW
CREAT SERPL-MCNC: 0.6 MG/DL (ref 0.5–0.9)
EOSINOPHIL # BLD: 0.27 K/UL (ref 0–0.44)
EOSINOPHILS RELATIVE PERCENT: 4 % (ref 1–4)
EPI CELLS #/AREA URNS HPF: ABNORMAL /HPF (ref 0–5)
ERYTHROCYTE [DISTWIDTH] IN BLOOD BY AUTOMATED COUNT: 12.4 % (ref 11.8–14.4)
GFR SERPL CREATININE-BSD FRML MDRD: >60 ML/MIN/1.73M2
GLOBULIN SER CALC-MCNC: 2.6 G/DL (ref 1.5–3.8)
GLUCOSE SERPL-MCNC: 259 MG/DL (ref 70–99)
GLUCOSE UR STRIP-MCNC: ABNORMAL MG/DL
HCO3 VENOUS: 25.2 MMOL/L (ref 22–29)
HCT VFR BLD AUTO: 43.5 % (ref 36.3–47.1)
HGB BLD-MCNC: 14.6 G/DL (ref 11.9–15.1)
HGB UR QL STRIP.AUTO: ABNORMAL
IMM GRANULOCYTES # BLD AUTO: <0.03 K/UL (ref 0–0.3)
IMM GRANULOCYTES NFR BLD: 0 %
INR PPP: 1
KETONES UR STRIP-MCNC: NEGATIVE MG/DL
LEUKOCYTE ESTERASE UR QL STRIP: NEGATIVE
LIPASE SERPL-CCNC: 20 U/L (ref 13–60)
LYMPHOCYTES NFR BLD: 2.11 K/UL (ref 1.1–3.7)
LYMPHOCYTES RELATIVE PERCENT: 34 % (ref 24–43)
MCH RBC QN AUTO: 29.4 PG (ref 25.2–33.5)
MCHC RBC AUTO-ENTMCNC: 33.6 G/DL (ref 25.2–33.5)
MCV RBC AUTO: 87.5 FL (ref 82.6–102.9)
MONOCYTES NFR BLD: 0.48 K/UL (ref 0.1–1.2)
MONOCYTES NFR BLD: 8 % (ref 3–12)
NEGATIVE BASE EXCESS, VEN: 0.3 MMOL/L (ref 0–2)
NEUTROPHILS NFR BLD: 53 % (ref 36–65)
NEUTS SEG NFR BLD: 3.31 K/UL (ref 1.5–8.1)
NITRITE UR QL STRIP: NEGATIVE
NRBC BLD-RTO: 0 PER 100 WBC
O2 SAT, VEN: 31.2 % (ref 60–85)
PARTIAL THROMBOPLASTIN TIME: 27.7 SEC (ref 23.9–33.8)
PCO2, VEN: 43.5 MM HG (ref 41–51)
PH UR STRIP: 7 [PH] (ref 5–6)
PH VENOUS: 7.37 (ref 7.32–7.43)
PLATELET # BLD AUTO: 321 K/UL (ref 138–453)
PMV BLD AUTO: 9.7 FL (ref 8.1–13.5)
PO2, VEN: 20.4 MM HG (ref 30–50)
POTASSIUM SERPL-SCNC: 4 MMOL/L (ref 3.7–5.3)
PROT SERPL-MCNC: 6.9 G/DL (ref 6.4–8.3)
PROT UR STRIP-MCNC: NEGATIVE MG/DL
PROTHROMBIN TIME: 13.1 SEC (ref 11.5–14.2)
RBC # BLD AUTO: 4.97 M/UL (ref 3.95–5.11)
RBC #/AREA URNS HPF: ABNORMAL /HPF (ref 0–4)
SODIUM SERPL-SCNC: 139 MMOL/L (ref 135–144)
SP GR UR STRIP: 1.02 (ref 1.01–1.02)
UROBILINOGEN UR STRIP-ACNC: NORMAL EU/DL (ref 0–1)
WBC #/AREA URNS HPF: ABNORMAL /HPF (ref 0–4)
WBC OTHER # BLD: 6.3 K/UL (ref 3.5–11.3)

## 2023-11-19 PROCEDURE — 80048 BASIC METABOLIC PNL TOTAL CA: CPT

## 2023-11-19 PROCEDURE — 84702 CHORIONIC GONADOTROPIN TEST: CPT

## 2023-11-19 PROCEDURE — 6360000002 HC RX W HCPCS: Performed by: EMERGENCY MEDICINE

## 2023-11-19 PROCEDURE — 85610 PROTHROMBIN TIME: CPT

## 2023-11-19 PROCEDURE — 85025 COMPLETE CBC W/AUTO DIFF WBC: CPT

## 2023-11-19 PROCEDURE — 36415 COLL VENOUS BLD VENIPUNCTURE: CPT

## 2023-11-19 PROCEDURE — 96361 HYDRATE IV INFUSION ADD-ON: CPT

## 2023-11-19 PROCEDURE — 81001 URINALYSIS AUTO W/SCOPE: CPT

## 2023-11-19 PROCEDURE — 96374 THER/PROPH/DIAG INJ IV PUSH: CPT

## 2023-11-19 PROCEDURE — 85730 THROMBOPLASTIN TIME PARTIAL: CPT

## 2023-11-19 PROCEDURE — 82803 BLOOD GASES ANY COMBINATION: CPT

## 2023-11-19 PROCEDURE — 83690 ASSAY OF LIPASE: CPT

## 2023-11-19 PROCEDURE — 82010 KETONE BODYS QUAN: CPT

## 2023-11-19 PROCEDURE — 99284 EMERGENCY DEPT VISIT MOD MDM: CPT

## 2023-11-19 PROCEDURE — 80076 HEPATIC FUNCTION PANEL: CPT

## 2023-11-19 PROCEDURE — 2580000003 HC RX 258: Performed by: EMERGENCY MEDICINE

## 2023-11-19 RX ORDER — ONDANSETRON 4 MG/1
4 TABLET, FILM COATED ORAL 3 TIMES DAILY PRN
Qty: 15 TABLET | Refills: 0 | Status: SHIPPED | OUTPATIENT
Start: 2023-11-19

## 2023-11-19 RX ORDER — ONDANSETRON 2 MG/ML
4 INJECTION INTRAMUSCULAR; INTRAVENOUS ONCE
Status: COMPLETED | OUTPATIENT
Start: 2023-11-19 | End: 2023-11-19

## 2023-11-19 RX ORDER — 0.9 % SODIUM CHLORIDE 0.9 %
1000 INTRAVENOUS SOLUTION INTRAVENOUS ONCE
Status: COMPLETED | OUTPATIENT
Start: 2023-11-19 | End: 2023-11-19

## 2023-11-19 RX ADMIN — SODIUM CHLORIDE 1000 ML: 9 INJECTION, SOLUTION INTRAVENOUS at 18:06

## 2023-11-19 RX ADMIN — ONDANSETRON 4 MG: 2 INJECTION INTRAMUSCULAR; INTRAVENOUS at 19:31

## 2023-11-19 ASSESSMENT — PAIN - FUNCTIONAL ASSESSMENT: PAIN_FUNCTIONAL_ASSESSMENT: 0-10

## 2023-11-19 ASSESSMENT — PAIN DESCRIPTION - LOCATION: LOCATION: ABDOMEN

## 2023-11-19 ASSESSMENT — PAIN SCALES - GENERAL: PAINLEVEL_OUTOF10: 2

## 2023-11-19 NOTE — ED PROVIDER NOTES
[facility]  Phone: [phone]        Pt Name: Larry Neri  MRN: [id number]  [address] [facility] 2000  Date of evaluation: 11/19/23      CHIEF COMPLAINT     Chief Complaint   Patient presents with    Hypoglycemia    Nausea    Dizziness         HISTORY OF PRESENT ILLNESS    Larry Neri is a 26 y.o. female who presents to our Emergency Department. Patient presents emerged department complaining of nausea and labile blood sugars. States that she has been in DKA approximately 1 time a 1-year to a year and a half ago. was concerned that she was slowly started to have that occur and she did want to come in later because she did not want to have a difficult time having IV access. States that she has her sugar controlled via IV pump. States that she has a Dexcom that has been reading blood sugars in the 200s at times. Sometimes has been lower as low as the 50s. Patient denies any vomiting but states that she feels nauseous. No polyuria, no polydipsia no polyphagia. Patient states that she does not have any abdominal pain. No diarrhea no constipation. Just has nausea and dry heaving. Patient is not having chest pain or shortness of breath. Has not had any change in her insulin regimen. Denies any dysuria. REVIEW OF SYSTEMS       Review of Systems   Constitutional:  Negative for chills, diaphoresis and fever. HENT:  Negative for drooling. Eyes:  Negative for redness. Respiratory:  Negative for cough, chest tightness and shortness of breath. Cardiovascular:  Negative for chest pain and palpitations. Gastrointestinal:  Positive for nausea. Negative for abdominal pain, constipation, diarrhea and vomiting. Endocrine: Negative for polydipsia, polyphagia and polyuria. Genitourinary:  Negative for dysuria and hematuria. Musculoskeletal:  Negative for neck stiffness. Skin:  Negative for rash.    Neurological:  Negative for

## 2023-11-20 ASSESSMENT — ENCOUNTER SYMPTOMS
CHEST TIGHTNESS: 0
VOMITING: 0
SHORTNESS OF BREATH: 0
COUGH: 0
DIARRHEA: 0
ABDOMINAL PAIN: 0
CONSTIPATION: 0
NAUSEA: 1
EYE REDNESS: 0

## 2023-11-20 NOTE — DISCHARGE INSTRUCTIONS
If you had imaging today, your results are:  No orders to display       Take your medication as indicated and prescribed. If you are given an antibiotic then, make sure you get the prescription filled and take the antibiotics until finished. PLEASE RETURN TO THE EMERGENCY DEPARTMENT IMMEDIATELY if your symptoms worsen in anyway or in 8-12 hours if not improved for re-evaluation. You should immediately return to the ER for symptoms such as increasing pain, bloody stool, fever, a feeling of passing out, light headed, dizziness, chest pain, shortness of breath, persistent nausea and/or vomiting, numbness or weakness to the arms or legs, coolness or color change of the arms or legs. Please understand that at this time there is no evidence for a more serious underlying process, but that early in the process of an illness or injury, an emergency department workup can be falsely reassuring. You should contact your family doctor within the next 24 hours for a follow up appointment. If you do not have one, we have attached the [facility] Same Day\" Physician line for you to call and they can provide you with one ([phone]). Kim Orn YOU! From [facility] ([facility]) and [facility] Emergency Services    On behalf of the Emergency Department staff at [facility]), I would like to thank you for giving us the opportunity to address your health care needs and concerns. We hope that during your visit, our service was delivered in a professional and caring manner. Please keep [facility]) in mind as we walk with you down the path to your own personal wellness. Please expect an automated text message or email from us so we can ask a few questions about your health and progress. Based on your answers, a clinician may call you back to offer help and instructions. Please understand that early in the process of an illness or injury, an emergency department workup can be falsely reassuring.   If you notice any

## 2023-12-07 ENCOUNTER — OFFICE VISIT (OUTPATIENT)
Dept: DIABETES SERVICES | Age: 23
End: 2023-12-07
Payer: COMMERCIAL

## 2023-12-07 VITALS
WEIGHT: 156.6 LBS | DIASTOLIC BLOOD PRESSURE: 72 MMHG | HEIGHT: 65 IN | BODY MASS INDEX: 26.09 KG/M2 | SYSTOLIC BLOOD PRESSURE: 118 MMHG | OXYGEN SATURATION: 99 % | HEART RATE: 84 BPM

## 2023-12-07 DIAGNOSIS — E10.9 TYPE 1 DIABETES MELLITUS WITHOUT COMPLICATION (HCC): Primary | ICD-10-CM

## 2023-12-07 DIAGNOSIS — E03.9 HYPOTHYROIDISM, UNSPECIFIED TYPE: ICD-10-CM

## 2023-12-07 PROCEDURE — 99212 OFFICE O/P EST SF 10 MIN: CPT | Performed by: NURSE PRACTITIONER

## 2023-12-07 ASSESSMENT — ENCOUNTER SYMPTOMS
SHORTNESS OF BREATH: 0
ABDOMINAL PAIN: 0
DIARRHEA: 0
RESPIRATORY NEGATIVE: 1

## 2023-12-11 ENCOUNTER — HOSPITAL ENCOUNTER (OUTPATIENT)
Age: 23
Discharge: HOME | End: 2023-12-11
Payer: COMMERCIAL

## 2023-12-11 ENCOUNTER — OFFICE VISIT (OUTPATIENT)
Dept: OBGYN | Age: 23
End: 2023-12-11
Payer: COMMERCIAL

## 2023-12-11 VITALS
DIASTOLIC BLOOD PRESSURE: 70 MMHG | HEIGHT: 65 IN | WEIGHT: 158.6 LBS | OXYGEN SATURATION: 99 % | SYSTOLIC BLOOD PRESSURE: 110 MMHG | HEART RATE: 82 BPM | RESPIRATION RATE: 14 BRPM | BODY MASS INDEX: 26.42 KG/M2

## 2023-12-11 DIAGNOSIS — Z30.41 ENCOUNTER FOR SURVEILLANCE OF CONTRACEPTIVE PILLS: ICD-10-CM

## 2023-12-11 DIAGNOSIS — Z01.419 WELL WOMAN EXAM WITH ROUTINE GYNECOLOGICAL EXAM: Primary | ICD-10-CM

## 2023-12-11 DIAGNOSIS — E10.9 TYPE 1 DIABETES MELLITUS WITHOUT COMPLICATION (HCC): ICD-10-CM

## 2023-12-11 DIAGNOSIS — E03.9 HYPOTHYROIDISM, UNSPECIFIED TYPE: ICD-10-CM

## 2023-12-11 LAB — TSH SERPL DL<=0.05 MIU/L-ACNC: 0.92 UIU/ML (ref 0.3–5)

## 2023-12-11 PROCEDURE — 83036 HEMOGLOBIN GLYCOSYLATED A1C: CPT

## 2023-12-11 PROCEDURE — 80061 LIPID PANEL: CPT

## 2023-12-11 PROCEDURE — 36415 COLL VENOUS BLD VENIPUNCTURE: CPT

## 2023-12-11 PROCEDURE — G8484 FLU IMMUNIZE NO ADMIN: HCPCS | Performed by: ADVANCED PRACTICE MIDWIFE

## 2023-12-11 PROCEDURE — 99395 PREV VISIT EST AGE 18-39: CPT

## 2023-12-11 PROCEDURE — 84443 ASSAY THYROID STIM HORMONE: CPT

## 2023-12-11 PROCEDURE — 99395 PREV VISIT EST AGE 18-39: CPT | Performed by: ADVANCED PRACTICE MIDWIFE

## 2023-12-11 RX ORDER — NORGESTIMATE AND ETHINYL ESTRADIOL 7DAYSX3 LO
1 KIT ORAL DAILY
Qty: 1 PACKET | Refills: 11 | Status: SHIPPED | OUTPATIENT
Start: 2023-12-11

## 2023-12-11 ASSESSMENT — ENCOUNTER SYMPTOMS
EYES NEGATIVE: 1
GASTROINTESTINAL NEGATIVE: 1
RESPIRATORY NEGATIVE: 1

## 2023-12-12 LAB
CHOLEST SERPL-MCNC: 151 MG/DL
CHOLESTEROL/HDL RATIO: 3.7
EST. AVERAGE GLUCOSE BLD GHB EST-MCNC: 154 MG/DL
HBA1C MFR BLD: 7 % (ref 4–6)
HDLC SERPL-MCNC: 41 MG/DL
LDLC SERPL CALC-MCNC: 94 MG/DL (ref 0–130)
TRIGL SERPL-MCNC: 82 MG/DL

## 2023-12-27 DIAGNOSIS — E10.9 TYPE 1 DIABETES MELLITUS WITHOUT COMPLICATION (HCC): ICD-10-CM

## 2023-12-27 RX ORDER — INSULIN PMP CART,AUT,G6/7,CNTR
1 EACH SUBCUTANEOUS
Qty: 10 EACH | Refills: 5 | Status: SHIPPED | OUTPATIENT
Start: 2023-12-27

## 2023-12-27 NOTE — TELEPHONE ENCOUNTER
Xenia Avilez called requesting a refill of the below medication which has been pended for you:     Requested Prescriptions     Pending Prescriptions Disp Refills    Insulin Disposable Pump (OMNIPOD 5 G6 POD, GEN 5,) MISC 10 each 5     Si each by Does not apply route every 3 days       Last Appointment Date: 2023  Next Appointment Date: 3/11/2024    Allergies   Allergen Reactions    Oxycodone Hives and Itching    Gluten Meal     Insulin Detemir Other (See Comments), Swelling and Hives     Local reaction  Local reaction    Other Other (See Comments)     flouride  Causes hives  flouride  Causes hives    Codeine Hives, Nausea And Vomiting and Rash     Other reaction(s): Vomiting

## 2024-03-11 ENCOUNTER — OFFICE VISIT (OUTPATIENT)
Dept: DIABETES SERVICES | Age: 24
End: 2024-03-11
Payer: COMMERCIAL

## 2024-03-11 VITALS
DIASTOLIC BLOOD PRESSURE: 66 MMHG | BODY MASS INDEX: 26.26 KG/M2 | SYSTOLIC BLOOD PRESSURE: 118 MMHG | WEIGHT: 157.6 LBS | HEART RATE: 61 BPM | OXYGEN SATURATION: 99 % | HEIGHT: 65 IN

## 2024-03-11 DIAGNOSIS — E03.9 HYPOTHYROIDISM, UNSPECIFIED TYPE: ICD-10-CM

## 2024-03-11 DIAGNOSIS — E10.9 TYPE 1 DIABETES MELLITUS WITHOUT COMPLICATION (HCC): Primary | ICD-10-CM

## 2024-03-11 PROCEDURE — 95251 CONT GLUC MNTR ANALYSIS I&R: CPT | Performed by: NURSE PRACTITIONER

## 2024-03-11 PROCEDURE — 3046F HEMOGLOBIN A1C LEVEL >9.0%: CPT | Performed by: NURSE PRACTITIONER

## 2024-03-11 PROCEDURE — G8484 FLU IMMUNIZE NO ADMIN: HCPCS | Performed by: NURSE PRACTITIONER

## 2024-03-11 PROCEDURE — 99214 OFFICE O/P EST MOD 30 MIN: CPT | Performed by: NURSE PRACTITIONER

## 2024-03-11 PROCEDURE — G8419 CALC BMI OUT NRM PARAM NOF/U: HCPCS | Performed by: NURSE PRACTITIONER

## 2024-03-11 PROCEDURE — G8427 DOCREV CUR MEDS BY ELIG CLIN: HCPCS | Performed by: NURSE PRACTITIONER

## 2024-03-11 PROCEDURE — 2022F DILAT RTA XM EVC RTNOPTHY: CPT | Performed by: NURSE PRACTITIONER

## 2024-03-11 PROCEDURE — 99212 OFFICE O/P EST SF 10 MIN: CPT | Performed by: NURSE PRACTITIONER

## 2024-03-11 PROCEDURE — 1036F TOBACCO NON-USER: CPT | Performed by: NURSE PRACTITIONER

## 2024-03-11 ASSESSMENT — ENCOUNTER SYMPTOMS
RESPIRATORY NEGATIVE: 1
DIARRHEA: 0
SHORTNESS OF BREATH: 0
ABDOMINAL PAIN: 0

## 2024-03-11 NOTE — PROGRESS NOTES
(KETOSTIX) strip Test daily as needed for ketones 50 strip 2    vitamin D (CHOLECALCIFEROL) 25 MCG (1000 UT) TABS tablet Take 1 tablet by mouth daily      BAQSIMI ONE PACK 3 MG/DOSE POWD       B-D INS SYR ULTRAFINE 1CC/31G 31G X 5/16\" 1 ML MISC       ULTRA-COMFORT INSULIN SYRINGE 31G X 5/16\" 0.5 ML MISC USE ONCE DAILY WITH LANTUS 30 each 3    glucagon (GLUCAGON EMERGENCY) 1 MG injection As directed for extreme hypoglycemia 1 kit 2    Insulin Disposable Pump (OMNIPOD 5 G6 INTRO, GEN 5,) KIT USE AS DIRECTED FOR THE UNDER THE SKIN DELIVERY OF INSULIN (Patient not taking: Reported on 3/11/2024) 1 kit 0     No current facility-administered medications for this visit.       Review ofSymptoms:  Review of Systems   Constitutional:  Positive for fatigue. Negative for unexpected weight change.   Eyes:  Negative for visual disturbance.   Respiratory: Negative.  Negative for shortness of breath.    Cardiovascular:  Negative for chest pain and leg swelling.   Gastrointestinal:  Negative for abdominal pain and diarrhea.   Endocrine: Negative for polydipsia, polyphagia and polyuria.   Genitourinary: Negative.    Musculoskeletal: Negative.    Skin:  Negative for rash and wound.   Neurological:  Negative for dizziness, tremors, seizures and headaches.   Psychiatric/Behavioral: Negative.  Negative for confusion and decreased concentration.      Theremainder of a complete 14-point review of systems is negative.       Vital Signs: /66 (Site: Right Upper Arm, Position: Sitting)   Pulse 61   Ht 1.651 m (5' 5\")   Wt 71.5 kg (157 lb 9.6 oz)   LMP 03/04/2024 (Approximate)   SpO2 99%   BMI 26.23 kg/m²      Wt Readings from Last 3 Encounters:   03/11/24 71.5 kg (157 lb 9.6 oz)   12/11/23 71.9 kg (158 lb 9.6 oz)   12/07/23 71 kg (156 lb 9.6 oz)     Body mass index is 26.23 kg/m².  LABS:  Hemoglobin A1C   Date Value Ref Range Status   12/11/2023 7.0 (H) 4.0 - 6.0 % Final   03/14/2023 8.1 (H) 4.0 - 6.0 % Final     No results found

## 2024-04-17 DIAGNOSIS — E03.9 HYPOTHYROIDISM, UNSPECIFIED TYPE: ICD-10-CM

## 2024-04-17 RX ORDER — INSULIN LISPRO 100 [IU]/ML
INJECTION, SOLUTION INTRAVENOUS; SUBCUTANEOUS
Qty: 30 ML | Refills: 3 | Status: SHIPPED | OUTPATIENT
Start: 2024-04-17

## 2024-04-17 RX ORDER — LEVOTHYROXINE SODIUM 112 UG/1
112 TABLET ORAL DAILY
Qty: 30 TABLET | Refills: 5 | Status: SHIPPED | OUTPATIENT
Start: 2024-04-17

## 2024-05-09 ENCOUNTER — HOSPITAL ENCOUNTER (OUTPATIENT)
Age: 24
Discharge: HOME | End: 2024-05-09
Payer: COMMERCIAL

## 2024-05-09 DIAGNOSIS — E10.9 TYPE 1 DIABETES MELLITUS WITHOUT COMPLICATION (HCC): ICD-10-CM

## 2024-05-09 LAB
EST. AVERAGE GLUCOSE BLD GHB EST-MCNC: 154 MG/DL
HBA1C MFR BLD: 7 % (ref 4–6)

## 2024-05-09 PROCEDURE — 83036 HEMOGLOBIN GLYCOSYLATED A1C: CPT

## 2024-05-09 PROCEDURE — 36415 COLL VENOUS BLD VENIPUNCTURE: CPT

## 2024-05-11 NOTE — TELEPHONE ENCOUNTER
Andre called requesting a refill of the below medication which has been pended for you:     Requested Prescriptions     Pending Prescriptions Disp Refills    levothyroxine (SYNTHROID) 112 MCG tablet [Pharmacy Med Name: LEVOTHYROXINE 112 MCG TABLET] 30 tablet 2     Sig: TAKE 1 TABLET BY MOUTH DAILY       Last Appointment Date: 9/21/2023  Next Appointment Date: 9/24/2024    Allergies   Allergen Reactions    Oxycodone Hives and Itching    Gluten Meal     Insulin Detemir Other (See Comments), Swelling and Hives     Local reaction  Local reaction    Other Other (See Comments)     flouride  Causes hives  flouride  Causes hives    Codeine Hives, Nausea And Vomiting and Rash     Other reaction(s): Vomiting      Opt out

## 2024-05-15 ENCOUNTER — OFFICE VISIT (OUTPATIENT)
Dept: DIABETES SERVICES | Age: 24
End: 2024-05-15
Payer: COMMERCIAL

## 2024-05-15 VITALS
DIASTOLIC BLOOD PRESSURE: 86 MMHG | HEIGHT: 65 IN | BODY MASS INDEX: 27.99 KG/M2 | HEART RATE: 90 BPM | SYSTOLIC BLOOD PRESSURE: 120 MMHG | WEIGHT: 168 LBS | OXYGEN SATURATION: 99 %

## 2024-05-15 DIAGNOSIS — E03.9 HYPOTHYROIDISM, UNSPECIFIED TYPE: ICD-10-CM

## 2024-05-15 DIAGNOSIS — E10.9 TYPE 1 DIABETES MELLITUS WITHOUT COMPLICATION (HCC): Primary | ICD-10-CM

## 2024-05-15 PROCEDURE — 99212 OFFICE O/P EST SF 10 MIN: CPT | Performed by: NURSE PRACTITIONER

## 2024-05-15 PROCEDURE — 99214 OFFICE O/P EST MOD 30 MIN: CPT | Performed by: NURSE PRACTITIONER

## 2024-05-15 PROCEDURE — G8427 DOCREV CUR MEDS BY ELIG CLIN: HCPCS | Performed by: NURSE PRACTITIONER

## 2024-05-15 PROCEDURE — G2211 COMPLEX E/M VISIT ADD ON: HCPCS | Performed by: NURSE PRACTITIONER

## 2024-05-15 PROCEDURE — 2022F DILAT RTA XM EVC RTNOPTHY: CPT | Performed by: NURSE PRACTITIONER

## 2024-05-15 PROCEDURE — 95251 CONT GLUC MNTR ANALYSIS I&R: CPT | Performed by: NURSE PRACTITIONER

## 2024-05-15 PROCEDURE — 3051F HG A1C>EQUAL 7.0%<8.0%: CPT | Performed by: NURSE PRACTITIONER

## 2024-05-15 PROCEDURE — G8419 CALC BMI OUT NRM PARAM NOF/U: HCPCS | Performed by: NURSE PRACTITIONER

## 2024-05-15 PROCEDURE — 1036F TOBACCO NON-USER: CPT | Performed by: NURSE PRACTITIONER

## 2024-05-15 ASSESSMENT — ENCOUNTER SYMPTOMS
RESPIRATORY NEGATIVE: 1
ABDOMINAL PAIN: 0
SHORTNESS OF BREATH: 0
DIARRHEA: 0

## 2024-05-15 NOTE — PROGRESS NOTES
[facility]  [address] [facility] [address] [phone]        HISTORY:    Andre Diaz presents today for evaluation and management of:  Chief Complaint   Patient presents with    Diabetes       Patient Care Team:  Lo Ibrahim DO as PCP - General (Family Medicine)  Lo Ibrahim DO as PCP - Empaneled Provider      Interval History:    Past DM Medications   MDI     Current Diabetic Medications  Humalog for use with omnipod TDD 60 units     DKA episodes: 2009 DKA with diagnosis after H1N1 vaccine. 2012 DKA unknown cause. 2022 DKA due to illness    03/11/24   Andre Diaz is a 23 y.o. female patient who presents to clinic today for her diabetes. she has a history of wide excursions, hypothyroidism and celiac disease which contributes to her diabetes. At previous visit pump settings were adjusted. she denies any current signs or symptoms of hyper/hypoglycemia. she states they are taking their medications as prescribed without any adverse effects.   Diet: counting carbs 30-40 per meal  Exercise: none  BS testing: uses cgm daily with success and is adherent to cgm therapy  Hypoglycemia: No  Hypoglycemia as needed treatment: snack, juice or emergency glucagon  Issues:   Diabetic foot exam up-to-date: Yes  Diabetic retinal exam up-to-date: No     Diabetes complications:none    05/15/24   Andre Diaz is a 23 y.o. female patient who presents to clinic today for her diabetes. she has a history of wide excursions, hypothyroidism and celiac disease  which contributes to her diabetes. At previous visit diabetes counseling was provided. she denies any current signs or symptoms of hyper/hypoglycemia. she states they are taking their medications as prescribed without any adverse effects.   Diet: counting carbs 30-40 per meal  Exercise: none  BS testing: uses cgm daily with success and is adherent to cgm

## 2024-06-01 ENCOUNTER — OFFICE VISIT (OUTPATIENT)
Dept: PRIMARY CARE CLINIC | Age: 24
End: 2024-06-01
Payer: COMMERCIAL

## 2024-06-01 ENCOUNTER — HOSPITAL ENCOUNTER (OUTPATIENT)
Age: 24
Setting detail: SPECIMEN
Discharge: HOME | End: 2024-06-01
Payer: COMMERCIAL

## 2024-06-01 VITALS
TEMPERATURE: 97.4 F | OXYGEN SATURATION: 100 % | WEIGHT: 164.25 LBS | SYSTOLIC BLOOD PRESSURE: 124 MMHG | BODY MASS INDEX: 27.36 KG/M2 | HEIGHT: 65 IN | DIASTOLIC BLOOD PRESSURE: 74 MMHG | HEART RATE: 79 BPM | RESPIRATION RATE: 15 BRPM

## 2024-06-01 DIAGNOSIS — E10.9 TYPE 1 DIABETES MELLITUS WITHOUT COMPLICATION (HCC): ICD-10-CM

## 2024-06-01 DIAGNOSIS — R11.2 NAUSEA AND VOMITING, UNSPECIFIED VOMITING TYPE: ICD-10-CM

## 2024-06-01 DIAGNOSIS — R11.2 NAUSEA AND VOMITING, UNSPECIFIED VOMITING TYPE: Primary | ICD-10-CM

## 2024-06-01 LAB
ALBUMIN SERPL-MCNC: 4.1 G/DL (ref 3.5–5.2)
ALBUMIN/GLOB SERPL: 1.3 {RATIO} (ref 1–2.5)
ALP SERPL-CCNC: 81 U/L (ref 35–104)
ALT SERPL-CCNC: 6 U/L (ref 5–33)
ANION GAP SERPL CALCULATED.3IONS-SCNC: 10 MMOL/L (ref 9–17)
AST SERPL-CCNC: 11 U/L
BASOPHILS # BLD: 0.03 K/UL (ref 0–0.2)
BASOPHILS NFR BLD: 1 % (ref 0–2)
BILIRUB SERPL-MCNC: 0.2 MG/DL (ref 0.3–1.2)
BILIRUB UR QL STRIP: NEGATIVE
BUN SERPL-MCNC: 16 MG/DL (ref 6–20)
BUN/CREAT SERPL: 27 (ref 9–20)
CALCIUM SERPL-MCNC: 9.5 MG/DL (ref 8.6–10.4)
CHLORIDE SERPL-SCNC: 104 MMOL/L (ref 98–107)
CLARITY UR: CLEAR
CO2 SERPL-SCNC: 26 MMOL/L (ref 20–31)
COLOR UR: YELLOW
COMMENT: ABNORMAL
CREAT SERPL-MCNC: 0.6 MG/DL (ref 0.5–0.9)
EOSINOPHIL # BLD: 0.06 K/UL (ref 0–0.44)
EOSINOPHILS RELATIVE PERCENT: 1 % (ref 1–4)
ERYTHROCYTE [DISTWIDTH] IN BLOOD BY AUTOMATED COUNT: 11.6 % (ref 11.8–14.4)
GFR, ESTIMATED: >90 ML/MIN/1.73M2
GLUCOSE SERPL-MCNC: 311 MG/DL (ref 70–99)
GLUCOSE UR STRIP-MCNC: ABNORMAL MG/DL
HCG UR QL: NEGATIVE
HCT VFR BLD AUTO: 40.5 % (ref 36.3–47.1)
HGB BLD-MCNC: 13.8 G/DL (ref 11.9–15.1)
HGB UR QL STRIP.AUTO: NEGATIVE
IMM GRANULOCYTES # BLD AUTO: 0.03 K/UL (ref 0–0.3)
IMM GRANULOCYTES NFR BLD: 1 %
KETONES UR STRIP-MCNC: ABNORMAL MG/DL
LEUKOCYTE ESTERASE UR QL STRIP: NEGATIVE
LYMPHOCYTES NFR BLD: 1.92 K/UL (ref 1.1–3.7)
LYMPHOCYTES RELATIVE PERCENT: 30 % (ref 24–43)
MCH RBC QN AUTO: 29.2 PG (ref 25.2–33.5)
MCHC RBC AUTO-ENTMCNC: 34.1 G/DL (ref 25.2–33.5)
MCV RBC AUTO: 85.6 FL (ref 82.6–102.9)
MONOCYTES NFR BLD: 0.41 K/UL (ref 0.1–1.2)
MONOCYTES NFR BLD: 6 % (ref 3–12)
NEUTROPHILS NFR BLD: 61 % (ref 36–65)
NEUTS SEG NFR BLD: 4.01 K/UL (ref 1.5–8.1)
NITRITE UR QL STRIP: NEGATIVE
NRBC BLD-RTO: 0 PER 100 WBC
PH UR STRIP: 6 [PH] (ref 5–6)
PLATELET # BLD AUTO: 411 K/UL (ref 138–453)
PMV BLD AUTO: 9.6 FL (ref 8.1–13.5)
POTASSIUM SERPL-SCNC: 4.2 MMOL/L (ref 3.7–5.3)
PROT SERPL-MCNC: 7.2 G/DL (ref 6.4–8.3)
PROT UR STRIP-MCNC: NEGATIVE MG/DL
RBC # BLD AUTO: 4.73 M/UL (ref 3.95–5.11)
SODIUM SERPL-SCNC: 140 MMOL/L (ref 135–144)
SP GR UR STRIP: 1.03 (ref 1.01–1.02)
UROBILINOGEN UR STRIP-ACNC: NORMAL EU/DL (ref 0–1)
WBC OTHER # BLD: 6.5 K/UL (ref 3.5–11.3)

## 2024-06-01 PROCEDURE — 81003 URINALYSIS AUTO W/O SCOPE: CPT

## 2024-06-01 PROCEDURE — G8427 DOCREV CUR MEDS BY ELIG CLIN: HCPCS | Performed by: FAMILY MEDICINE

## 2024-06-01 PROCEDURE — 36415 COLL VENOUS BLD VENIPUNCTURE: CPT

## 2024-06-01 PROCEDURE — 1036F TOBACCO NON-USER: CPT | Performed by: FAMILY MEDICINE

## 2024-06-01 PROCEDURE — 3051F HG A1C>EQUAL 7.0%<8.0%: CPT | Performed by: FAMILY MEDICINE

## 2024-06-01 PROCEDURE — 81025 URINE PREGNANCY TEST: CPT

## 2024-06-01 PROCEDURE — 80053 COMPREHEN METABOLIC PANEL: CPT

## 2024-06-01 PROCEDURE — 85025 COMPLETE CBC W/AUTO DIFF WBC: CPT

## 2024-06-01 PROCEDURE — 2022F DILAT RTA XM EVC RTNOPTHY: CPT | Performed by: FAMILY MEDICINE

## 2024-06-01 PROCEDURE — 99213 OFFICE O/P EST LOW 20 MIN: CPT | Performed by: FAMILY MEDICINE

## 2024-06-01 PROCEDURE — G8419 CALC BMI OUT NRM PARAM NOF/U: HCPCS | Performed by: FAMILY MEDICINE

## 2024-06-01 PROCEDURE — 99214 OFFICE O/P EST MOD 30 MIN: CPT | Performed by: FAMILY MEDICINE

## 2024-06-01 RX ORDER — ONDANSETRON 4 MG/1
4 TABLET, ORALLY DISINTEGRATING ORAL ONCE
Status: COMPLETED | OUTPATIENT
Start: 2024-06-01 | End: 2024-06-01

## 2024-06-01 RX ORDER — ONDANSETRON 4 MG/1
4 TABLET, ORALLY DISINTEGRATING ORAL EVERY 8 HOURS PRN
Qty: 21 TABLET | Refills: 1 | Status: SHIPPED | OUTPATIENT
Start: 2024-06-01

## 2024-06-01 RX ADMIN — ONDANSETRON 4 MG: 4 TABLET, ORALLY DISINTEGRATING ORAL at 15:22

## 2024-06-01 ASSESSMENT — ENCOUNTER SYMPTOMS
NAUSEA: 1
CHANGE IN BOWEL HABIT: 1
ABDOMINAL PAIN: 0
SORE THROAT: 1
COUGH: 1
VOMITING: 1

## 2024-06-01 NOTE — PROGRESS NOTES
[facility] [place] [facility]  [address]  [facility] [address]  Dept: [phone]  Dept Fax: [phone]    Andre Diaz is a 23 y.o. female who presents today for her medical conditions/complaints as noted below.  Andre Diaz is c/o of   Chief Complaint   Patient presents with    Emesis     Vomiting 2-3 days, higher sugar. She reports congestion runny nose and sore throat off and on        HPI:     Here today for nausea and vomiting.     Nausea & Vomiting  This is a new problem. The current episode started in the past 7 days (2-3 days). The problem occurs intermittently. The problem has been waxing and waning. Associated symptoms include anorexia, a change in bowel habit, congestion, coughing (productive), fatigue, headaches (occasionaly), nausea, a sore throat and vomiting. Pertinent negatives include no abdominal pain, fever, myalgias, rash or urinary symptoms. The symptoms are aggravated by eating. Treatments tried: zyrtec. The treatment provided no relief.   She has been having some hypoglycemia because she has been on her period.       Past Medical History:   Diagnosis Date    Abdominal pain     Abnormal stools     Acute kidney injury superimposed on CKD (HCC) 10/24/2022    Celiac disease 2019    Chronic lymphocytic thyroiditis     Delivery by emergency  section 2022    Diabetic ketoacidosis (HCC)     Hyperopia with astigmatism     Hypothyroid     Insomnia     Migraine     Precocious adrenarche (HCC)     Precocious puberty      delivery     Rh incompatibility     Seasonal allergies     Seizures (HCC)     D/T BS out of control, no seizure disorder    Type 1 diabetes mellitus without (mention of) complication     date of diagnosis - 2/15/2010          Social History     Tobacco Use    Smoking status: Never     Passive exposure: Yes    Smokeless tobacco: Never

## 2024-06-10 DIAGNOSIS — E10.9 TYPE 1 DIABETES MELLITUS WITHOUT COMPLICATION (HCC): ICD-10-CM

## 2024-06-10 RX ORDER — INSULIN PMP CART,AUT,G6/7,CNTR
EACH SUBCUTANEOUS
Qty: 10 EACH | Refills: 5 | OUTPATIENT
Start: 2024-06-10

## 2024-06-10 RX ORDER — INSULIN PMP CART,AUT,G6/7,CNTR
1 EACH SUBCUTANEOUS
Qty: 10 EACH | Refills: 5 | Status: SHIPPED | OUTPATIENT
Start: 2024-06-10

## 2024-06-18 DIAGNOSIS — E10.9 TYPE 1 DIABETES MELLITUS WITHOUT COMPLICATION (HCC): ICD-10-CM

## 2024-06-18 RX ORDER — PROCHLORPERAZINE 25 MG/1
SUPPOSITORY RECTAL
Qty: 3 EACH | Refills: 3 | Status: SHIPPED | OUTPATIENT
Start: 2024-06-18

## 2024-07-05 DIAGNOSIS — E10.9 TYPE 1 DIABETES MELLITUS WITHOUT COMPLICATION (HCC): ICD-10-CM

## 2024-07-05 RX ORDER — PROCHLORPERAZINE 25 MG/1
SUPPOSITORY RECTAL
Qty: 1 EACH | Status: SHIPPED | OUTPATIENT
Start: 2024-07-05

## 2024-07-05 NOTE — TELEPHONE ENCOUNTER
Pharmacy  requesting a refill of the below medication which has been pended for you:     Requested Prescriptions     Pending Prescriptions Disp Refills    Continuous Glucose Transmitter (DEXCOM G6 TRANSMITTER) MISC [Pharmacy Med Name: Dexcom G6 Transmitter device] 1 each PRN     Sig: Use one transmitter with Dexcom G6 sensord to monitor blood glucose. Replace every 90 days as directed.       Last Appointment Date: 5/15/2024  Next Appointment Date: 8/26/2024    Allergies   Allergen Reactions    Oxycodone Hives and Itching    Gluten Meal     Insulin Detemir Other (See Comments), Swelling and Hives     Local reaction  Local reaction    Other Other (See Comments)     flouride  Causes hives  flouride  Causes hives    Codeine Hives, Nausea And Vomiting and Rash     Other reaction(s): Vomiting

## 2024-08-26 ENCOUNTER — OFFICE VISIT (OUTPATIENT)
Dept: DIABETES SERVICES | Age: 24
End: 2024-08-26
Payer: COMMERCIAL

## 2024-08-26 VITALS
WEIGHT: 160 LBS | OXYGEN SATURATION: 100 % | SYSTOLIC BLOOD PRESSURE: 116 MMHG | DIASTOLIC BLOOD PRESSURE: 82 MMHG | HEIGHT: 65 IN | BODY MASS INDEX: 26.66 KG/M2 | HEART RATE: 72 BPM

## 2024-08-26 DIAGNOSIS — E10.9 TYPE 1 DIABETES MELLITUS WITHOUT COMPLICATION (HCC): Primary | ICD-10-CM

## 2024-08-26 DIAGNOSIS — E03.9 HYPOTHYROIDISM, UNSPECIFIED TYPE: ICD-10-CM

## 2024-08-26 PROCEDURE — G8419 CALC BMI OUT NRM PARAM NOF/U: HCPCS | Performed by: NURSE PRACTITIONER

## 2024-08-26 PROCEDURE — 1036F TOBACCO NON-USER: CPT | Performed by: NURSE PRACTITIONER

## 2024-08-26 PROCEDURE — 3051F HG A1C>EQUAL 7.0%<8.0%: CPT | Performed by: NURSE PRACTITIONER

## 2024-08-26 PROCEDURE — 95251 CONT GLUC MNTR ANALYSIS I&R: CPT | Performed by: NURSE PRACTITIONER

## 2024-08-26 PROCEDURE — 2022F DILAT RTA XM EVC RTNOPTHY: CPT | Performed by: NURSE PRACTITIONER

## 2024-08-26 PROCEDURE — G2211 COMPLEX E/M VISIT ADD ON: HCPCS | Performed by: NURSE PRACTITIONER

## 2024-08-26 PROCEDURE — G8427 DOCREV CUR MEDS BY ELIG CLIN: HCPCS | Performed by: NURSE PRACTITIONER

## 2024-08-26 PROCEDURE — 99212 OFFICE O/P EST SF 10 MIN: CPT | Performed by: NURSE PRACTITIONER

## 2024-08-26 PROCEDURE — 99214 OFFICE O/P EST MOD 30 MIN: CPT | Performed by: NURSE PRACTITIONER

## 2024-08-26 ASSESSMENT — ENCOUNTER SYMPTOMS
SHORTNESS OF BREATH: 0
RESPIRATORY NEGATIVE: 1
DIARRHEA: 0
ABDOMINAL PAIN: 0

## 2024-08-26 NOTE — PROGRESS NOTES
[facility]  [facility] INTERNAL MED A DEPARTMENT OF [facility]  [address] [facility] [address] [phone]        HISTORY:    Andre Diaz presents today for evaluation and management of:  Chief Complaint   Patient presents with    Diabetes     Is looking for eye doctor for DM eye exam.        Patient Care Team:  Lo Ibrahim DO as PCP - General (Family Medicine)  Lo Ibrahim DO as PCP - Empaneled Provider      Interval History:    Past DM Medications   MDI     Current Diabetic Medications  Humalog for use with omnipod TDD 60 units     DKA episodes: 2009 DKA with diagnosis after H1N1 vaccine. 2012 DKA unknown cause. 2022 DKA due to illness    05/15/24   Andre Diaz is a 23 y.o. female patient who presents to clinic today for her diabetes. she has a history of wide excursions, hypothyroidism and celiac disease  which contributes to her diabetes. At previous visit diabetes counseling was provided. she denies any current signs or symptoms of hyper/hypoglycemia. she states they are taking their medications as prescribed without any adverse effects.   Diet: counting carbs 30-40 per meal  Exercise: none  BS testing: uses cgm daily with success and is adherent to cgm therapy  Hypoglycemia: No  Hypoglycemia as needed treatment: snack, juice or emergency glucagon  Issues:   Diabetic foot exam up-to-date: Yes  Diabetic retinal exam up-to-date: No     Diabetes complications:none    08/26/24   Andre Diaz is a 23 y.o. female patient who presents to clinic today for her diabetes. she has a history of wide excursions, hypothyroidism and celiac disease which contributes to her diabetes. At previous visit diabetes counseling was provided. she denies any current signs or symptoms of hyper/hypoglycemia. she states they are taking their medications as prescribed without any adverse effects.   Diet: counting carbs 30-40 per meal  Exercise: none  BS testing:  toes, feet, and ankles bilaterally. No joint deformity. No cyanosis or clubbing. 100% sensation at all 22 test points with the 10 gram filament. Dorsalis pedis pulses intact bilaterally. Capillary refill at the toes was less than 2 seconds. Vibratory sensation (with 128 Hz tuning fork) intact bilaterally. Light touch sensation intact bilaterally. Hair growth present on feet and toes bilaterally. No skin breakdown, erythema, rub spots, blisters, scaling, or ulcers. No calluses or corns. Toenails thin and not ingrown. No evidence of fungal infection.      ASSESSMENT/PLAN:     Diagnosis Orders   1. Type 1 diabetes mellitus without complication (HCC)  Hemoglobin A1C    Microalbumin, Ur    HM DIABETES FOOT EXAM      2. Hypothyroidism, unspecified type  TSH With Reflex Ft4        Orders Placed This Encounter   Procedures    Hemoglobin A1C    Microalbumin, Ur    TSH With Reflex Ft4    HM DIABETES FOOT EXAM     No orders of the defined types were placed in this encounter.    Requested Prescriptions      No prescriptions requested or ordered in this encounter       1. Type 1 diabetes mellitus without complication (HCC)  - Stable  HbA1C goal is less than 7%.  - Fasting blood glucose goal is 70-120mg/dl and postprandial blood sugar goal is less than 180 mg/dl.   - Labs reviewed including most recent A1c, UACR and kidney function. Repeat labs due in 3 months.    -We discussed in great detail dietary modifications they can make to better improve their blood sugars.  -follow up diabetes education completed, all questions answered.  -doing well no changes   CGM report downloaded and reviewed from the past 2 weeks scanned to media tab.   Insulin pump settings downloaded and reviewed. Scanned into media tab.       Discussed signs and symptoms of hyper/hypoglycemia and how to treat. Encouraged 150 minutes of physical activity per week. Follow a low carbohydrate diet. Encouraged at least 7 hours of sleep.The patient was informed of

## 2024-09-23 ENCOUNTER — HOSPITAL ENCOUNTER (INPATIENT)
Age: 24
LOS: 2 days | Discharge: HOME | DRG: 420 | End: 2024-09-25
Attending: STUDENT IN AN ORGANIZED HEALTH CARE EDUCATION/TRAINING PROGRAM | Admitting: INTERNAL MEDICINE
Payer: COMMERCIAL

## 2024-09-23 ENCOUNTER — APPOINTMENT (OUTPATIENT)
Dept: GENERAL RADIOLOGY | Age: 24
DRG: 420 | End: 2024-09-23
Payer: COMMERCIAL

## 2024-09-23 DIAGNOSIS — E10.9 TYPE 1 DIABETES MELLITUS WITHOUT COMPLICATION (HCC): ICD-10-CM

## 2024-09-23 DIAGNOSIS — E10.10 TYPE 1 DIABETES MELLITUS WITH KETOACIDOSIS WITHOUT COMA (HCC): Primary | ICD-10-CM

## 2024-09-23 PROBLEM — N17.9 ACUTE KIDNEY INJURY SUPERIMPOSED ON CKD (HCC): Status: RESOLVED | Noted: 2022-10-24 | Resolved: 2024-09-23

## 2024-09-23 PROBLEM — E10.69 TYPE 1 DIABETES MELLITUS WITH OTHER SPECIFIED COMPLICATION (HCC): Status: ACTIVE | Noted: 2024-09-23

## 2024-09-23 PROBLEM — O24.019 PRE-EXISTING TYPE 1 DIABETES MELLITUS DURING PREGNANCY: Status: RESOLVED | Noted: 2021-06-30 | Resolved: 2024-09-23

## 2024-09-23 PROBLEM — N18.9 ACUTE KIDNEY INJURY SUPERIMPOSED ON CKD (HCC): Status: RESOLVED | Noted: 2022-10-24 | Resolved: 2024-09-23

## 2024-09-23 LAB
ALBUMIN SERPL-MCNC: 4.6 G/DL (ref 3.5–5.2)
ALBUMIN/GLOB SERPL: 1.5 {RATIO} (ref 1–2.5)
ALP SERPL-CCNC: 73 U/L (ref 35–104)
ALT SERPL-CCNC: 9 U/L (ref 5–33)
ANION GAP SERPL CALCULATED.3IONS-SCNC: 26 MMOL/L (ref 9–17)
AST SERPL-CCNC: 13 U/L
B-OH-BUTYR SERPL-MCNC: 5.38 MMOL/L (ref 0.02–0.27)
BACTERIA URNS QL MICRO: ABNORMAL
BASOPHILS # BLD: 0.08 K/UL (ref 0–0.2)
BASOPHILS NFR BLD: 1 % (ref 0–2)
BILIRUB SERPL-MCNC: 0.7 MG/DL (ref 0.3–1.2)
BILIRUB UR QL STRIP: NEGATIVE
BUN SERPL-MCNC: 19 MG/DL (ref 6–20)
BUN/CREAT SERPL: 21 (ref 9–20)
CALCIUM SERPL-MCNC: 9.6 MG/DL (ref 8.6–10.4)
CHLORIDE SERPL-SCNC: 99 MMOL/L (ref 98–107)
CLARITY UR: CLEAR
CO2 SERPL-SCNC: 7 MMOL/L (ref 20–31)
COLOR UR: YELLOW
CREAT SERPL-MCNC: 0.9 MG/DL (ref 0.5–0.9)
EOSINOPHIL # BLD: <0.03 K/UL (ref 0–0.44)
EOSINOPHILS RELATIVE PERCENT: 0 % (ref 1–4)
EPI CELLS #/AREA URNS HPF: ABNORMAL /HPF (ref 0–5)
ERYTHROCYTE [DISTWIDTH] IN BLOOD BY AUTOMATED COUNT: 11.9 % (ref 11.8–14.4)
FLUAV AG SPEC QL: NEGATIVE
FLUBV AG SPEC QL: NEGATIVE
GFR, ESTIMATED: >90 ML/MIN/1.73M2
GLUCOSE SERPL-MCNC: 500 MG/DL (ref 70–99)
GLUCOSE UR STRIP-MCNC: ABNORMAL MG/DL
HCG SERPL QL: NEGATIVE
HCO3 VENOUS: 7.4 MMOL/L (ref 22–29)
HCT VFR BLD AUTO: 43.1 % (ref 36.3–47.1)
HGB BLD-MCNC: 14.5 G/DL (ref 11.9–15.1)
HGB UR QL STRIP.AUTO: NEGATIVE
IMM GRANULOCYTES # BLD AUTO: 0.14 K/UL (ref 0–0.3)
IMM GRANULOCYTES NFR BLD: 1 %
KETONES UR STRIP-MCNC: ABNORMAL MG/DL
LACTATE BLDV-SCNC: 3.2 MMOL/L (ref 0.5–1.9)
LEUKOCYTE ESTERASE UR QL STRIP: NEGATIVE
LIPASE SERPL-CCNC: 10 U/L (ref 13–60)
LYMPHOCYTES NFR BLD: 1.01 K/UL (ref 1.1–3.7)
LYMPHOCYTES RELATIVE PERCENT: 6 % (ref 24–43)
MAGNESIUM SERPL-MCNC: 2.2 MG/DL (ref 1.6–2.6)
MCH RBC QN AUTO: 29.9 PG (ref 25.2–33.5)
MCHC RBC AUTO-ENTMCNC: 33.6 G/DL (ref 25.2–33.5)
MCV RBC AUTO: 88.9 FL (ref 82.6–102.9)
MONOCYTES NFR BLD: 0.67 K/UL (ref 0.1–1.2)
MONOCYTES NFR BLD: 4 % (ref 3–12)
NEGATIVE BASE EXCESS, VEN: 19.7 MMOL/L (ref 0–2)
NEUTROPHILS NFR BLD: 88 % (ref 36–65)
NEUTS SEG NFR BLD: 14.94 K/UL (ref 1.5–8.1)
NITRITE UR QL STRIP: NEGATIVE
NRBC BLD-RTO: 0 PER 100 WBC
O2 SAT, VEN: 90.4 % (ref 60–85)
PCO2 VENOUS: 21.9 MM HG (ref 41–51)
PH UR STRIP: 6 [PH] (ref 5–6)
PH VENOUS: 7.14 (ref 7.32–7.43)
PHOSPHATE SERPL-MCNC: 5.3 MG/DL (ref 2.6–4.5)
PLATELET # BLD AUTO: 396 K/UL (ref 138–453)
PMV BLD AUTO: 10.5 FL (ref 8.1–13.5)
PO2 VENOUS: 75.5 MM HG (ref 30–50)
POTASSIUM SERPL-SCNC: 5.5 MMOL/L (ref 3.7–5.3)
PROT SERPL-MCNC: 7.6 G/DL (ref 6.4–8.3)
PROT UR STRIP-MCNC: NEGATIVE MG/DL
RBC # BLD AUTO: 4.85 M/UL (ref 3.95–5.11)
RBC #/AREA URNS HPF: ABNORMAL /HPF (ref 0–4)
SARS-COV-2 RDRP RESP QL NAA+PROBE: NOT DETECTED
SODIUM SERPL-SCNC: 132 MMOL/L (ref 135–144)
SP GR UR STRIP: 1.02 (ref 1.01–1.02)
SPECIMEN DESCRIPTION: NORMAL
UROBILINOGEN UR STRIP-ACNC: NORMAL EU/DL (ref 0–1)
WBC #/AREA URNS HPF: ABNORMAL /HPF (ref 0–4)
WBC OTHER # BLD: 16.8 K/UL (ref 3.5–11.3)

## 2024-09-23 PROCEDURE — 96376 TX/PRO/DX INJ SAME DRUG ADON: CPT

## 2024-09-23 PROCEDURE — 80053 COMPREHEN METABOLIC PANEL: CPT

## 2024-09-23 PROCEDURE — 81001 URINALYSIS AUTO W/SCOPE: CPT

## 2024-09-23 PROCEDURE — 83690 ASSAY OF LIPASE: CPT

## 2024-09-23 PROCEDURE — 87635 SARS-COV-2 COVID-19 AMP PRB: CPT

## 2024-09-23 PROCEDURE — 2580000003 HC RX 258: Performed by: STUDENT IN AN ORGANIZED HEALTH CARE EDUCATION/TRAINING PROGRAM

## 2024-09-23 PROCEDURE — 99222 1ST HOSP IP/OBS MODERATE 55: CPT | Performed by: NURSE PRACTITIONER

## 2024-09-23 PROCEDURE — 96361 HYDRATE IV INFUSION ADD-ON: CPT

## 2024-09-23 PROCEDURE — 2000000000 HC ICU R&B

## 2024-09-23 PROCEDURE — 85025 COMPLETE CBC W/AUTO DIFF WBC: CPT

## 2024-09-23 PROCEDURE — 84100 ASSAY OF PHOSPHORUS: CPT

## 2024-09-23 PROCEDURE — 36415 COLL VENOUS BLD VENIPUNCTURE: CPT

## 2024-09-23 PROCEDURE — 82010 KETONE BODYS QUAN: CPT

## 2024-09-23 PROCEDURE — 87804 INFLUENZA ASSAY W/OPTIC: CPT

## 2024-09-23 PROCEDURE — 2580000003 HC RX 258: Performed by: NURSE PRACTITIONER

## 2024-09-23 PROCEDURE — 6360000002 HC RX W HCPCS: Performed by: STUDENT IN AN ORGANIZED HEALTH CARE EDUCATION/TRAINING PROGRAM

## 2024-09-23 PROCEDURE — 84703 CHORIONIC GONADOTROPIN ASSAY: CPT

## 2024-09-23 PROCEDURE — 99285 EMERGENCY DEPT VISIT HI MDM: CPT

## 2024-09-23 PROCEDURE — 96374 THER/PROPH/DIAG INJ IV PUSH: CPT

## 2024-09-23 PROCEDURE — 71045 X-RAY EXAM CHEST 1 VIEW: CPT

## 2024-09-23 PROCEDURE — 82947 ASSAY GLUCOSE BLOOD QUANT: CPT

## 2024-09-23 PROCEDURE — 96375 TX/PRO/DX INJ NEW DRUG ADDON: CPT

## 2024-09-23 PROCEDURE — 82803 BLOOD GASES ANY COMBINATION: CPT

## 2024-09-23 PROCEDURE — 36556 INSERT NON-TUNNEL CV CATH: CPT

## 2024-09-23 PROCEDURE — 83735 ASSAY OF MAGNESIUM: CPT

## 2024-09-23 PROCEDURE — 6370000000 HC RX 637 (ALT 250 FOR IP): Performed by: STUDENT IN AN ORGANIZED HEALTH CARE EDUCATION/TRAINING PROGRAM

## 2024-09-23 PROCEDURE — 02HV33Z INSERTION OF INFUSION DEVICE INTO SUPERIOR VENA CAVA, PERCUTANEOUS APPROACH: ICD-10-PCS | Performed by: STUDENT IN AN ORGANIZED HEALTH CARE EDUCATION/TRAINING PROGRAM

## 2024-09-23 PROCEDURE — 83605 ASSAY OF LACTIC ACID: CPT

## 2024-09-23 RX ORDER — PROCHLORPERAZINE 25 MG/1
SUPPOSITORY RECTAL
Qty: 3 EACH | Refills: 11 | Status: SHIPPED | OUTPATIENT
Start: 2024-09-23

## 2024-09-23 RX ORDER — ACETAMINOPHEN 325 MG/1
650 TABLET ORAL EVERY 6 HOURS PRN
Status: DISCONTINUED | OUTPATIENT
Start: 2024-09-23 | End: 2024-09-25 | Stop reason: HOSPADM

## 2024-09-23 RX ORDER — MAGNESIUM SULFATE IN WATER 40 MG/ML
2000 INJECTION, SOLUTION INTRAVENOUS PRN
Status: DISCONTINUED | OUTPATIENT
Start: 2024-09-23 | End: 2024-09-25

## 2024-09-23 RX ORDER — ENOXAPARIN SODIUM 100 MG/ML
40 INJECTION SUBCUTANEOUS DAILY
Status: DISCONTINUED | OUTPATIENT
Start: 2024-09-24 | End: 2024-09-25 | Stop reason: HOSPADM

## 2024-09-23 RX ORDER — DEXTROSE MONOHYDRATE AND SODIUM CHLORIDE 5; .45 G/100ML; G/100ML
INJECTION, SOLUTION INTRAVENOUS CONTINUOUS PRN
Status: DISCONTINUED | OUTPATIENT
Start: 2024-09-23 | End: 2024-09-25 | Stop reason: HOSPADM

## 2024-09-23 RX ORDER — ACETAMINOPHEN 650 MG/1
650 SUPPOSITORY RECTAL EVERY 6 HOURS PRN
Status: DISCONTINUED | OUTPATIENT
Start: 2024-09-23 | End: 2024-09-25 | Stop reason: HOSPADM

## 2024-09-23 RX ORDER — ONDANSETRON 2 MG/ML
4 INJECTION INTRAMUSCULAR; INTRAVENOUS ONCE
Status: COMPLETED | OUTPATIENT
Start: 2024-09-23 | End: 2024-09-23

## 2024-09-23 RX ORDER — LEVOTHYROXINE SODIUM 112 UG/1
112 TABLET ORAL DAILY
Status: DISCONTINUED | OUTPATIENT
Start: 2024-09-24 | End: 2024-09-25 | Stop reason: HOSPADM

## 2024-09-23 RX ORDER — SODIUM CHLORIDE 9 MG/ML
INJECTION, SOLUTION INTRAVENOUS PRN
Status: DISCONTINUED | OUTPATIENT
Start: 2024-09-23 | End: 2024-09-25 | Stop reason: HOSPADM

## 2024-09-23 RX ORDER — SODIUM CHLORIDE 0.9 % (FLUSH) 0.9 %
5-40 SYRINGE (ML) INJECTION EVERY 12 HOURS SCHEDULED
Status: DISCONTINUED | OUTPATIENT
Start: 2024-09-23 | End: 2024-09-25 | Stop reason: HOSPADM

## 2024-09-23 RX ORDER — PROCHLORPERAZINE EDISYLATE 5 MG/ML
10 INJECTION INTRAMUSCULAR; INTRAVENOUS EVERY 6 HOURS PRN
Status: DISCONTINUED | OUTPATIENT
Start: 2024-09-23 | End: 2024-09-25 | Stop reason: HOSPADM

## 2024-09-23 RX ORDER — SODIUM CHLORIDE 0.9 % (FLUSH) 0.9 %
5-40 SYRINGE (ML) INJECTION PRN
Status: DISCONTINUED | OUTPATIENT
Start: 2024-09-23 | End: 2024-09-25 | Stop reason: HOSPADM

## 2024-09-23 RX ORDER — SODIUM CHLORIDE 450 MG/100ML
INJECTION, SOLUTION INTRAVENOUS CONTINUOUS
Status: DISCONTINUED | OUTPATIENT
Start: 2024-09-23 | End: 2024-09-25 | Stop reason: HOSPADM

## 2024-09-23 RX ORDER — POTASSIUM CHLORIDE 7.45 MG/ML
10 INJECTION INTRAVENOUS PRN
Status: DISCONTINUED | OUTPATIENT
Start: 2024-09-23 | End: 2024-09-25

## 2024-09-23 RX ORDER — 0.9 % SODIUM CHLORIDE 0.9 %
2000 INTRAVENOUS SOLUTION INTRAVENOUS ONCE
Status: COMPLETED | OUTPATIENT
Start: 2024-09-23 | End: 2024-09-23

## 2024-09-23 RX ORDER — ONDANSETRON 2 MG/ML
4 INJECTION INTRAMUSCULAR; INTRAVENOUS EVERY 4 HOURS PRN
Status: DISCONTINUED | OUTPATIENT
Start: 2024-09-23 | End: 2024-09-25 | Stop reason: HOSPADM

## 2024-09-23 RX ADMIN — SODIUM CHLORIDE: 4.5 INJECTION, SOLUTION INTRAVENOUS at 23:45

## 2024-09-23 RX ADMIN — ONDANSETRON 4 MG: 2 INJECTION INTRAMUSCULAR; INTRAVENOUS at 21:50

## 2024-09-23 RX ADMIN — SODIUM CHLORIDE 1000 ML: 9 INJECTION, SOLUTION INTRAVENOUS at 20:47

## 2024-09-23 RX ADMIN — ONDANSETRON 4 MG: 2 INJECTION INTRAMUSCULAR; INTRAVENOUS at 20:49

## 2024-09-23 RX ADMIN — INSULIN HUMAN 7 UNITS: 100 INJECTION, SOLUTION PARENTERAL at 22:48

## 2024-09-23 ASSESSMENT — ENCOUNTER SYMPTOMS
NAUSEA: 1
ABDOMINAL PAIN: 0
WHEEZING: 0
DIARRHEA: 0
BLOOD IN STOOL: 0
SHORTNESS OF BREATH: 0
FACIAL SWELLING: 0
COUGH: 0
VOMITING: 1
BACK PAIN: 0
SORE THROAT: 0

## 2024-09-23 ASSESSMENT — PAIN - FUNCTIONAL ASSESSMENT: PAIN_FUNCTIONAL_ASSESSMENT: NONE - DENIES PAIN

## 2024-09-24 LAB
ANION GAP SERPL CALCULATED.3IONS-SCNC: 11 MMOL/L (ref 9–17)
ANION GAP SERPL CALCULATED.3IONS-SCNC: 24 MMOL/L (ref 9–17)
ANION GAP SERPL CALCULATED.3IONS-SCNC: 9 MMOL/L (ref 9–17)
B-OH-BUTYR SERPL-MCNC: 2.14 MMOL/L (ref 0.02–0.27)
BASOPHILS # BLD: 0.03 K/UL (ref 0–0.2)
BASOPHILS NFR BLD: 0 % (ref 0–2)
BUN SERPL-MCNC: 10 MG/DL (ref 6–20)
BUN SERPL-MCNC: 13 MG/DL (ref 6–20)
BUN SERPL-MCNC: 18 MG/DL (ref 6–20)
BUN/CREAT SERPL: 14 (ref 9–20)
BUN/CREAT SERPL: 16 (ref 9–20)
BUN/CREAT SERPL: 20 (ref 9–20)
CALCIUM SERPL-MCNC: 8.3 MG/DL (ref 8.6–10.4)
CALCIUM SERPL-MCNC: 8.3 MG/DL (ref 8.6–10.4)
CALCIUM SERPL-MCNC: 8.4 MG/DL (ref 8.6–10.4)
CHLORIDE SERPL-SCNC: 109 MMOL/L (ref 98–107)
CHLORIDE SERPL-SCNC: 113 MMOL/L (ref 98–107)
CHLORIDE SERPL-SCNC: 116 MMOL/L (ref 98–107)
CO2 SERPL-SCNC: 12 MMOL/L (ref 20–31)
CO2 SERPL-SCNC: 16 MMOL/L (ref 20–31)
CO2 SERPL-SCNC: 7 MMOL/L (ref 20–31)
CREAT SERPL-MCNC: 0.7 MG/DL (ref 0.5–0.9)
CREAT SERPL-MCNC: 0.8 MG/DL (ref 0.5–0.9)
CREAT SERPL-MCNC: 0.9 MG/DL (ref 0.5–0.9)
EOSINOPHIL # BLD: <0.03 K/UL (ref 0–0.44)
EOSINOPHILS RELATIVE PERCENT: 0 % (ref 1–4)
ERYTHROCYTE [DISTWIDTH] IN BLOOD BY AUTOMATED COUNT: 12.4 % (ref 11.8–14.4)
EST. AVERAGE GLUCOSE BLD GHB EST-MCNC: 166 MG/DL
GFR, ESTIMATED: >90 ML/MIN/1.73M2
GLUCOSE BLD-MCNC: 103 MG/DL (ref 65–105)
GLUCOSE BLD-MCNC: 104 MG/DL (ref 65–105)
GLUCOSE BLD-MCNC: 117 MG/DL (ref 65–105)
GLUCOSE BLD-MCNC: 127 MG/DL (ref 65–105)
GLUCOSE BLD-MCNC: 127 MG/DL (ref 65–105)
GLUCOSE BLD-MCNC: 135 MG/DL (ref 65–105)
GLUCOSE BLD-MCNC: 139 MG/DL (ref 65–105)
GLUCOSE BLD-MCNC: 143 MG/DL (ref 65–105)
GLUCOSE BLD-MCNC: 145 MG/DL (ref 65–105)
GLUCOSE BLD-MCNC: 161 MG/DL (ref 65–105)
GLUCOSE BLD-MCNC: 161 MG/DL (ref 65–105)
GLUCOSE BLD-MCNC: 179 MG/DL (ref 65–105)
GLUCOSE BLD-MCNC: 192 MG/DL (ref 65–105)
GLUCOSE BLD-MCNC: 211 MG/DL (ref 65–105)
GLUCOSE BLD-MCNC: 221 MG/DL (ref 65–105)
GLUCOSE BLD-MCNC: 227 MG/DL (ref 65–105)
GLUCOSE BLD-MCNC: 229 MG/DL (ref 65–105)
GLUCOSE BLD-MCNC: 238 MG/DL (ref 65–105)
GLUCOSE BLD-MCNC: 248 MG/DL (ref 65–105)
GLUCOSE BLD-MCNC: 262 MG/DL (ref 65–105)
GLUCOSE BLD-MCNC: 267 MG/DL (ref 65–105)
GLUCOSE BLD-MCNC: 511 MG/DL (ref 65–105)
GLUCOSE SERPL-MCNC: 138 MG/DL (ref 70–99)
GLUCOSE SERPL-MCNC: 268 MG/DL (ref 70–99)
GLUCOSE SERPL-MCNC: 324 MG/DL (ref 70–99)
HBA1C MFR BLD: 7.4 % (ref 4–6)
HCO3 VENOUS: 13.9 MMOL/L (ref 22–29)
HCT VFR BLD AUTO: 35 % (ref 36.3–47.1)
HGB BLD-MCNC: 11.9 G/DL (ref 11.9–15.1)
IMM GRANULOCYTES # BLD AUTO: 0.11 K/UL (ref 0–0.3)
IMM GRANULOCYTES NFR BLD: 1 %
LACTATE BLDV-SCNC: 0.9 MMOL/L (ref 0.5–2.2)
LACTATE BLDV-SCNC: 2.8 MMOL/L (ref 0.5–2.2)
LYMPHOCYTES NFR BLD: 1.83 K/UL (ref 1.1–3.7)
LYMPHOCYTES RELATIVE PERCENT: 12 % (ref 24–43)
MAGNESIUM SERPL-MCNC: 1.9 MG/DL (ref 1.6–2.6)
MAGNESIUM SERPL-MCNC: 2.1 MG/DL (ref 1.6–2.6)
MCH RBC QN AUTO: 29.9 PG (ref 25.2–33.5)
MCHC RBC AUTO-ENTMCNC: 34 G/DL (ref 25.2–33.5)
MCV RBC AUTO: 87.9 FL (ref 82.6–102.9)
MONOCYTES NFR BLD: 1.07 K/UL (ref 0.1–1.2)
MONOCYTES NFR BLD: 7 % (ref 3–12)
NEGATIVE BASE EXCESS, VEN: 10.6 MMOL/L (ref 0–2)
NEUTROPHILS NFR BLD: 80 % (ref 36–65)
NEUTS SEG NFR BLD: 11.8 K/UL (ref 1.5–8.1)
NRBC BLD-RTO: 0 PER 100 WBC
O2 SAT, VEN: 70.3 % (ref 60–85)
PCO2 VENOUS: 26.6 MM HG (ref 41–51)
PH VENOUS: 7.33 (ref 7.32–7.43)
PHOSPHATE SERPL-MCNC: 1.4 MG/DL (ref 2.6–4.5)
PLATELET # BLD AUTO: 312 K/UL (ref 138–453)
PMV BLD AUTO: 9.8 FL (ref 8.1–13.5)
PO2 VENOUS: 38.7 MM HG (ref 30–50)
POTASSIUM SERPL-SCNC: 3.6 MMOL/L (ref 3.7–5.3)
POTASSIUM SERPL-SCNC: 4.4 MMOL/L (ref 3.7–5.3)
POTASSIUM SERPL-SCNC: 4.6 MMOL/L (ref 3.7–5.3)
RBC # BLD AUTO: 3.98 M/UL (ref 3.95–5.11)
SODIUM SERPL-SCNC: 136 MMOL/L (ref 135–144)
SODIUM SERPL-SCNC: 140 MMOL/L (ref 135–144)
SODIUM SERPL-SCNC: 141 MMOL/L (ref 135–144)
T4 FREE SERPL-MCNC: 1.8 NG/DL (ref 0.92–1.68)
TSH SERPL DL<=0.05 MIU/L-ACNC: 0.2 UIU/ML (ref 0.3–5)
WBC OTHER # BLD: 14.8 K/UL (ref 3.5–11.3)

## 2024-09-24 PROCEDURE — 99231 SBSQ HOSP IP/OBS SF/LOW 25: CPT | Performed by: INTERNAL MEDICINE

## 2024-09-24 PROCEDURE — 82803 BLOOD GASES ANY COMBINATION: CPT

## 2024-09-24 PROCEDURE — 84100 ASSAY OF PHOSPHORUS: CPT

## 2024-09-24 PROCEDURE — 36415 COLL VENOUS BLD VENIPUNCTURE: CPT

## 2024-09-24 PROCEDURE — 2580000003 HC RX 258: Performed by: NURSE PRACTITIONER

## 2024-09-24 PROCEDURE — 2000000000 HC ICU R&B

## 2024-09-24 PROCEDURE — 36592 COLLECT BLOOD FROM PICC: CPT

## 2024-09-24 PROCEDURE — 82010 KETONE BODYS QUAN: CPT

## 2024-09-24 PROCEDURE — 2500000003 HC RX 250 WO HCPCS: Performed by: INTERNAL MEDICINE

## 2024-09-24 PROCEDURE — 6360000002 HC RX W HCPCS: Performed by: NURSE PRACTITIONER

## 2024-09-24 PROCEDURE — 2580000003 HC RX 258: Performed by: INTERNAL MEDICINE

## 2024-09-24 PROCEDURE — 84439 ASSAY OF FREE THYROXINE: CPT

## 2024-09-24 PROCEDURE — 6370000000 HC RX 637 (ALT 250 FOR IP): Performed by: NURSE PRACTITIONER

## 2024-09-24 PROCEDURE — 83605 ASSAY OF LACTIC ACID: CPT

## 2024-09-24 PROCEDURE — 80048 BASIC METABOLIC PNL TOTAL CA: CPT

## 2024-09-24 PROCEDURE — 83036 HEMOGLOBIN GLYCOSYLATED A1C: CPT

## 2024-09-24 PROCEDURE — 85025 COMPLETE CBC W/AUTO DIFF WBC: CPT

## 2024-09-24 PROCEDURE — 94760 N-INVAS EAR/PLS OXIMETRY 1: CPT

## 2024-09-24 PROCEDURE — 83735 ASSAY OF MAGNESIUM: CPT

## 2024-09-24 PROCEDURE — 84443 ASSAY THYROID STIM HORMONE: CPT

## 2024-09-24 RX ORDER — NORGESTIMATE AND ETHINYL ESTRADIOL 7DAYSX3 LO
1 KIT ORAL
Status: DISCONTINUED | OUTPATIENT
Start: 2024-09-24 | End: 2024-09-25 | Stop reason: HOSPADM

## 2024-09-24 RX ADMIN — SODIUM PHOSPHATE, MONOBASIC, MONOHYDRATE AND SODIUM PHOSPHATE, DIBASIC, ANHYDROUS 15 MMOL: 142; 276 INJECTION, SOLUTION INTRAVENOUS at 08:47

## 2024-09-24 RX ADMIN — LEVOTHYROXINE SODIUM 112 MCG: 0.11 TABLET ORAL at 08:56

## 2024-09-24 RX ADMIN — DEXTROSE AND SODIUM CHLORIDE: 5; 450 INJECTION, SOLUTION INTRAVENOUS at 22:05

## 2024-09-24 RX ADMIN — DEXTROSE AND SODIUM CHLORIDE: 5; 450 INJECTION, SOLUTION INTRAVENOUS at 01:45

## 2024-09-24 RX ADMIN — SODIUM CHLORIDE, PRESERVATIVE FREE 10 ML: 5 INJECTION INTRAVENOUS at 22:51

## 2024-09-24 RX ADMIN — ENOXAPARIN SODIUM 40 MG: 100 INJECTION SUBCUTANEOUS at 08:56

## 2024-09-24 RX ADMIN — SODIUM CHLORIDE, PRESERVATIVE FREE 10 ML: 5 INJECTION INTRAVENOUS at 00:06

## 2024-09-24 RX ADMIN — SODIUM CHLORIDE, PRESERVATIVE FREE 10 ML: 5 INJECTION INTRAVENOUS at 08:48

## 2024-09-24 RX ADMIN — DEXTROSE AND SODIUM CHLORIDE: 5; 450 INJECTION, SOLUTION INTRAVENOUS at 15:14

## 2024-09-24 RX ADMIN — POTASSIUM CHLORIDE 10 MEQ: 7.46 INJECTION, SOLUTION INTRAVENOUS at 01:44

## 2024-09-24 RX ADMIN — POTASSIUM CHLORIDE 10 MEQ: 7.46 INJECTION, SOLUTION INTRAVENOUS at 00:39

## 2024-09-24 RX ADMIN — ONDANSETRON 4 MG: 2 INJECTION INTRAMUSCULAR; INTRAVENOUS at 05:01

## 2024-09-24 RX ADMIN — DEXTROSE AND SODIUM CHLORIDE: 5; 450 INJECTION, SOLUTION INTRAVENOUS at 08:39

## 2024-09-24 NOTE — H&P
HOSPITALIST ADMISSION H&P    REASON FOR ADMISSION:  DKA type 1  ESTIMATED LENGTH OF STAY:>2 midnights, 3 days    ATTENDING/ADMITTING PHYSICIAN: KONRAD Judd MD    HISTORY OF PRESENT ILLNESS:      The patient is a 23 y.o. female patient of Lo Ibrahim DO who presents from the ER with c/o nausea and vomiting for the past 2 days, and high blood sugars for the past 1 day. She reports she was recently exposed to influenza. She has an omnipod insulin pump and dexcom CGM.    In ER, she was found to be in DKA with blood glucose 500, pH 7.1, urine with ketones, anion gap 26, beta hydroxybutyrate 5.38. Influenza and covid negative. Right IJ CVC was placed in ER due to poor IV access. In ER, she received 2L IV fluids, IV insulin gtt with bolus, and zofran. Dr. Navarrete, ER, requested hospitalization at 2240.     See below for additional PMH.    Patient [id number]-[id number]-available records reviewed, including, but not limited to ER records, imaging results, lab results, office records, personal records, and OARRS -- no signs of abuse or diversion.     Past Medical History:   Diagnosis Date    Abdominal pain     Abnormal stools     Acute kidney injury superimposed on CKD (HCC) 10/24/2022    Celiac disease 2019    Chronic lymphocytic thyroiditis     Delivery by emergency  section 2022    Diabetic ketoacidosis (HCC)     Hyperopia with astigmatism     Hypothyroid     Insomnia     Migraine     Pre-existing type 1 diabetes mellitus during pregnancy 2021    Precocious adrenarche (HCC)     Precocious puberty      delivery     Rh incompatibility     Seasonal allergies     Seizures (HCC)     D/T BS out of control    Type 1 diabetes mellitus without (mention of) complication     date of diagnosis - 2/15/2010           Past Surgical History:   Procedure Laterality Date     SECTION  2022    OTHER SURGICAL HISTORY  2011    SUPPRELIN IMPLANT PLACEMENT FOR PRECOCIOUS PUBERTY

## 2024-09-24 NOTE — CARE COORDINATION
Case Management Assessment  Initial Evaluation    Date/Time of Evaluation: 9/24/2024 11:35 AM  Assessment Completed by: Fernanda Elder RN    If patient is discharged prior to next notation, then this note serves as note for discharge by case management.    Patient Name: Andre Diaz                   YOB: 2000  Diagnosis: DKA, type 1, not at goal (HCC) [E10.10]  Type 1 diabetes mellitus with ketoacidosis without coma (HCC) [E10.10]                   Date / Time: 9/23/2024  8:00 PM    Patient Admission Status: Inpatient   Readmission Risk (Low < 19, Mod (19-27), High > 27): Readmission Risk Score: 8.6    Current PCP: Lo Ibrahim, DO  PCP verified by CM? Yes    Chart Reviewed: Yes      History Provided by: Patient  Patient Orientation: Alert and Oriented    Patient Cognition: Alert    Hospitalization in the last 30 days (Readmission):  No    If yes, Readmission Assessment in CM Navigator will be completed.    Advance Directives:      Code Status: Full Code   Patient's Primary Decision Maker is: Patient Declined (Legal Next of Kin Remains as Decision Maker)      Discharge Planning:    Patient lives with: Children, Family Members Type of Home: House  Primary Care Giver: Self  Patient Support Systems include: Family Members   Current Financial resources: Medicaid  Current community resources: None  Current services prior to admission: None            Current DME:              Type of Home Care services:  None    ADLS  Prior functional level: Independent in ADLs/IADLs  Current functional level: Independent in ADLs/IADLs    PT AM-PAC:   /24  OT AM-PAC:   /24    Family can provide assistance at DC: Yes  Would you like Case Management to discuss the discharge plan with any other family members/significant others, and if so, who? No  Plans to Return to Present Housing: Yes  Other Identified Issues/Barriers to RETURNING to current housing: yes  Potential Assistance needed at discharge: N/A             Potential DME:    Patient expects to discharge to: Trailer/mobile home  Plan for transportation at discharge:      Financial    [place] [facility] [place] [place] / Plan: [employer] PLAN / Product Type: *No Product type* /     Does insurance require precert for SNF: Yes    Potential assistance Purchasing Medications:    Meds-to-Beds request: Yes      [id number] - [place], [place] - [address] - P [phone] - F [phone]  [address]  [place] [address]  Phone: [phone] Fax: [phone]    [facility] - [place], [place] - [address] - P [phone] - F [phone]  [address]  [place] [address]  Phone: [phone] Fax: [phone]      Notes:    Factors facilitating achievement of predicted outcomes: Family support, Cooperative, and Pleasant    Barriers to discharge: Decreased endurance    Additional Case Management Notes: Patient lives at home and is independent. Pt denies any discharge needs at present time.    The Plan for Transition of Care is related to the following treatment goals of DKA, type 1, not at goal (HCC) [E10.10]  Type 1 diabetes mellitus with ketoacidosis without coma (HCC) [E10.10]    IF APPLICABLE: The Patient and/or patient representative Andre and her family were provided with a choice of provider and agrees with the discharge plan. Freedom of choice list with basic dialogue that supports the patient's individualized plan of care/goals and shares the quality data associated with the providers was provided to:     Patient Representative Name:       The Patient and/or Patient Representative Agree with the Discharge Plan?      Fernanda Elder RN  Case Management Department

## 2024-09-24 NOTE — CARE COORDINATION
DISCHARGE BARRIERS       Reason for Referral:  SW completed a Psychosocial Assessment for evaluation of patient's mental health, social status, and functional capacity within the community. Andre Diaz is a 23 y.o. female admitted due to DKA, type 1, not at goal (HCC). Patient alone. SW provided supportive listening while patient discussed past medical history and events leading up to hospitalization.       Mental Status:  Alert, oriented, and engaging during assessment.     Decision Making:  Makes own decisions.     Family/Social/Home Environment: lives with their family (grandmother, 2 year old son, and occasionally  FOB stays with the).    Employment status: Employed part time     Health Insurance:     Active Insurance as of 9/23/2024       Primary Coverage       Payor Plan Insurance Group Employer/Plan Group    [facility] HEALTH PLAN [facility] HEALTH PLAN        Payor Plan Address Payor Plan Phone Number Payor Plan Fax Number Effective Dates    [address] [phone]  7/1/2020 - None Entered    [facility] [id number]         Subscriber Name Subscriber Birth Date Member ID       ANDRE DIAZ 2000 [id number]                     Support: Discussed a good social support network     Current Services:   SNAP (food stamps) and Medicaid (medical assistance). Patient states her SNAP had ended for the month however, she plans to reapply on her own.      Current DMEs: none reported     PCP: Lo Ibrahim, DO and repots no issues affording medication.      status:   No     ADLs and means of transportation: Independent in ADLs/IADLs in ADLs prior to hospitalization and unable to transport self.patient states her grandmother assists with transportation.     Food insecurity or needed financial assistance: Denies any food insecurity or financial concerns at this time.    Income Source: employment and family    ACP and Code Status:  SW discussed an Advance Directive which included  the patient's choices for care and treatment in the case of a health event that adversely affects decision-making abilities. SW provided education and resources. Andre Diaz has no questions at this time and has agreed to keep me up-to-date should anything change.    Andre Diaz is a Full Code status and has NO advanced directive - not interested in additional information.      Collaborative List of SNF/ECF/HH were provided: offered, declined No discharge order at this time.    Anticipated Needs/Discharge Plan:  Spoke with patient/family/representative about discharge plan. Patient/Family/Representative verbalizes understanding of the plan of care and denies discharge needs or further services at this time. SW provided business card. SW will continue to monitor needs and assist as appropriate.              Electronically signed by BRI Sotelo on 9/24/2024 at 12:51 PM

## 2024-09-24 NOTE — ED NOTES
ED Report    Presents to ED from: Home    Chief Complaint   Patient presents with    Emesis    Nausea     1. Type 1 diabetes mellitus with ketoacidosis without coma (HCC)      Present Condition: pt has history of diabetes. Has blood sugar reader and insulin pump. Pt bloods sugars have been over 500 hundred at home.   Pertinent Event(s): cvc on right side. Insulin drip iv insulin bolus. 2L NS bolus. Iv zofran.     LOC:  A+O x 4  Code Status: FULL    Vital Signs   Vitals:    09/23/24 2004 09/23/24 2106 09/23/24 2227   BP: (!) 141/64     Pulse: 96     Resp: 18     Temp: 98.4 °F (36.9 °C)     TempSrc: Rectal     SpO2: 99%  100%   Weight:  74.8 kg (165 lb)    Height: 1.651 m (5' 5\")       Oxygen Baseline: Room Air       Current Oxygen Needs: Room Air  Mobility: Independent       Mobility Aide: Independent    LDAs:   Peripheral IV Left;Posterior Hand (Active)   Site Assessment Clean, dry & intact 09/23/24 2032   Phlebitis Assessment No symptoms 09/23/24 2032   Infiltration Assessment 0 09/23/24 2032     Abnormal ED Labs:    Labs Reviewed   CBC WITH AUTO DIFFERENTIAL - Abnormal; Notable for the following components:       Result Value    WBC 16.8 (*)     MCHC 33.6 (*)     Neutrophils % 88 (*)     Lymphocytes % 6 (*)     Eosinophils % 0 (*)     Immature Granulocytes % 1 (*)     Neutrophils Absolute 14.94 (*)     Lymphocytes Absolute 1.01 (*)     All other components within normal limits   COMPREHENSIVE METABOLIC PANEL - Abnormal; Notable for the following components:    Sodium 132 (*)     Potassium 5.5 (*)     CO2 7 (*)     Anion Gap 26 (*)     Glucose 500 (*)     BUN/Creatinine Ratio 21 (*)     All other components within normal limits   LACTATE, SEPSIS - Abnormal; Notable for the following components:    Lactic Acid, Sepsis 3.2 (*)     All other components within normal limits   URINALYSIS WITH MICROSCOPIC - Abnormal; Notable for the following components:    Glucose, Ur 2+ (*)     Ketones, Urine 3+ (*)     All other

## 2024-09-24 NOTE — PLAN OF CARE
are monitored and maintained or improved  9/24/2024 1131 by Laura Harrison RN  Outcome: Progressing  Flowsheets (Taken 9/24/2024 0900)  Care Plan - Patient's Chronic Conditions and Co-Morbidity Symptoms are Monitored and Maintained or Improved:   Monitor and assess patient's chronic conditions and comorbid symptoms for stability, deterioration, or improvement   Collaborate with multidisciplinary team to address chronic and comorbid conditions and prevent exacerbation or deterioration   Update acute care plan with appropriate goals if chronic or comorbid symptoms are exacerbated and prevent overall improvement and discharge  9/24/2024 0656 by Love Bianchi RN  Outcome: Progressing  Flowsheets (Taken 9/23/2024 2330)  Care Plan - Patient's Chronic Conditions and Co-Morbidity Symptoms are Monitored and Maintained or Improved:   Monitor and assess patient's chronic conditions and comorbid symptoms for stability, deterioration, or improvement   Collaborate with multidisciplinary team to address chronic and comorbid conditions and prevent exacerbation or deterioration   Update acute care plan with appropriate goals if chronic or comorbid symptoms are exacerbated and prevent overall improvement and discharge     Problem: Nutrition Deficit:  Goal: Optimize nutritional status  9/24/2024 1131 by Laura Harrison RN  Outcome: Progressing  Flowsheets (Taken 9/24/2024 1131)  Nutrient intake appropriate for improving, restoring, or maintaining nutritional needs:   Assess nutritional status and recommend course of action   Monitor oral intake, labs, and treatment plans  9/24/2024 0656 by Love Bianchi RN  Outcome: Progressing     Problem: Safety - Adult  Goal: Free from fall injury  9/24/2024 1131 by Laura Harrison RN  Outcome: Progressing  Note: Safety maintained; remains free from injury at this time  9/24/2024 0656 by Love Bianchi RN  Outcome: Progressing     Problem: Pain  Goal: Verbalizes/displays  adequate comfort level or baseline comfort level  9/24/2024 1131 by Laura Harrison, RN  Outcome: Progressing  Flowsheets (Taken 9/24/2024 1131)  Verbalizes/displays adequate comfort level or baseline comfort level:   Encourage patient to monitor pain and request assistance   Assess pain using appropriate pain scale   Administer analgesics based on type and severity of pain and evaluate response   Implement non-pharmacological measures as appropriate and evaluate response  Note: No c/o pain at this time  9/24/2024 0656 by Love Bianchi, RN  Outcome: Progressing

## 2024-09-24 NOTE — PLAN OF CARE
Problem: Discharge Planning  Goal: Discharge to home or other facility with appropriate resources  Outcome: Progressing  Flowsheets (Taken 9/23/2024 2330)  Discharge to home or other facility with appropriate resources:   Identify barriers to discharge with patient and caregiver   Arrange for needed discharge resources and transportation as appropriate   Refer to discharge planning if patient needs post-hospital services based on physician order or complex needs related to functional status, cognitive ability or social support system   Identify discharge learning needs (meds, wound care, etc)     Problem: ABCDS Injury Assessment  Goal: Absence of physical injury  Outcome: Progressing     Problem: Gastrointestinal - Adult  Goal: Minimal or absence of nausea and vomiting  Outcome: Progressing  Flowsheets (Taken 9/23/2024 2330)  Minimal or absence of nausea and vomiting:   Administer IV fluids as ordered to ensure adequate hydration   Maintain NPO status until nausea and vomiting are resolved   Administer ordered antiemetic medications as needed   Provide nonpharmacologic comfort measures as appropriate     Problem: Metabolic/Fluid and Electrolytes - Adult  Goal: Electrolytes maintained within normal limits  Outcome: Progressing  Flowsheets (Taken 9/23/2024 2330)  Electrolytes maintained within normal limits:   Monitor labs and assess patient for signs and symptoms of electrolyte imbalances   Administer electrolyte replacement as ordered   Monitor response to electrolyte replacements, including repeat lab results as appropriate   Instruct patient on fluid and nutrition restrictions as appropriate  Goal: Hemodynamic stability and optimal renal function maintained  Outcome: Progressing  Flowsheets (Taken 9/23/2024 2330)  Hemodynamic stability and optimal renal function maintained:   Monitor labs and assess for signs and symptoms of volume excess or deficit   Monitor intake, output and patient weight   Instruct patient  on fluid and nutrition restrictions as appropriate  Goal: Glucose maintained within prescribed range  Outcome: Progressing  Flowsheets (Taken 9/23/2024 2330)  Glucose maintained within prescribed range:   Monitor blood glucose as ordered   Assess for signs and symptoms of hyperglycemia and hypoglycemia   Administer ordered medications to maintain glucose within target range   Instruct patient on self management of diabetes and initiate consult as needed     Problem: Skin/Tissue Integrity  Goal: Absence of new skin breakdown  Description: 1.  Monitor for areas of redness and/or skin breakdown  2.  Assess vascular access sites hourly  3.  Every 4-6 hours minimum:  Change oxygen saturation probe site  4.  Every 4-6 hours:  If on nasal continuous positive airway pressure, respiratory therapy assess nares and determine need for appliance change or resting period.  Outcome: Progressing     Problem: Chronic Conditions and Co-morbidities  Goal: Patient's chronic conditions and co-morbidity symptoms are monitored and maintained or improved  Outcome: Progressing  Flowsheets (Taken 9/23/2024 2330)  Care Plan - Patient's Chronic Conditions and Co-Morbidity Symptoms are Monitored and Maintained or Improved:   Monitor and assess patient's chronic conditions and comorbid symptoms for stability, deterioration, or improvement   Collaborate with multidisciplinary team to address chronic and comorbid conditions and prevent exacerbation or deterioration   Update acute care plan with appropriate goals if chronic or comorbid symptoms are exacerbated and prevent overall improvement and discharge     Problem: Nutrition Deficit:  Goal: Optimize nutritional status  Outcome: Progressing     Problem: Safety - Adult  Goal: Free from fall injury  Outcome: Progressing     Problem: Pain  Goal: Verbalizes/displays adequate comfort level or baseline comfort level  Outcome: Progressing

## 2024-09-24 NOTE — ED PROVIDER NOTES
[facility]  [facility]  EMERGENCY DEPARTMENT ENCOUNTER      Pt Name: Andre Diaz  MRN: [id number]  Birthdate 2000  Date of evaluation: 9/23/2024  Provider: Shekhar Navarrete DO    CHIEF COMPLAINT       Chief Complaint   Patient presents with    Emesis    Nausea         HISTORY OF PRESENT ILLNESS   (Location/Symptom, Timing/Onset, Context/Setting, Quality, Duration, Modifying Factors, Severity)  Note limiting factors.   Andre Diaz is a 23 y.o. female who presents to the emergency department with flulike symptoms and hyperglycemia.  Patient states that she has been exposed to influenza and that today she has not been able to keep any food or water down with several episodes of nausea, vomiting, diarrhea.  She notes that she is a type I diabetic and uses an insulin pump.  She states that her Dexcom has been reading \"high\" for most of the day which means that her blood sugars greater than 500.  Denies any abdominal pain or urinary symptoms.    HPI    Nursing Notes were reviewed.    REVIEW OF SYSTEMS    (2-9 systems for level 4, 10 or more for level 5)     Review of Systems   Constitutional:  Positive for chills, diaphoresis and fatigue.        Positive for subjective fevers   HENT:  Negative for congestion, facial swelling and sore throat.    Eyes:  Negative for visual disturbance.   Respiratory:  Negative for cough, shortness of breath and wheezing.    Cardiovascular:  Negative for chest pain, palpitations and leg swelling.   Gastrointestinal:  Positive for nausea and vomiting. Negative for abdominal pain, blood in stool and diarrhea.   Genitourinary:  Negative for dysuria and hematuria.   Musculoskeletal:  Negative for back pain and neck pain.   Skin:  Negative for rash.   Neurological:  Negative for syncope, weakness, numbness and headaches.   Hematological:  Does not bruise/bleed easily.       Except as noted above the remainder of the review of systems was reviewed and negative.       PAST MEDICAL HISTORY

## 2024-09-25 ENCOUNTER — TELEPHONE (OUTPATIENT)
Dept: INTERNAL MEDICINE | Age: 24
End: 2024-09-25

## 2024-09-25 VITALS
OXYGEN SATURATION: 100 % | RESPIRATION RATE: 16 BRPM | DIASTOLIC BLOOD PRESSURE: 74 MMHG | HEIGHT: 65 IN | HEART RATE: 59 BPM | TEMPERATURE: 99.3 F | WEIGHT: 160.7 LBS | BODY MASS INDEX: 26.77 KG/M2 | SYSTOLIC BLOOD PRESSURE: 121 MMHG

## 2024-09-25 LAB
ANION GAP SERPL CALCULATED.3IONS-SCNC: 7 MMOL/L (ref 9–17)
B-OH-BUTYR SERPL-MCNC: 0.21 MMOL/L (ref 0.02–0.27)
BASOPHILS # BLD: 0.04 K/UL (ref 0–0.2)
BASOPHILS NFR BLD: 1 % (ref 0–2)
BUN SERPL-MCNC: 4 MG/DL (ref 6–20)
BUN/CREAT SERPL: 7 (ref 9–20)
CALCIUM SERPL-MCNC: 8 MG/DL (ref 8.6–10.4)
CHLORIDE SERPL-SCNC: 112 MMOL/L (ref 98–107)
CO2 SERPL-SCNC: 18 MMOL/L (ref 20–31)
CREAT SERPL-MCNC: 0.6 MG/DL (ref 0.5–0.9)
EOSINOPHIL # BLD: 0.08 K/UL (ref 0–0.44)
EOSINOPHILS RELATIVE PERCENT: 1 % (ref 1–4)
ERYTHROCYTE [DISTWIDTH] IN BLOOD BY AUTOMATED COUNT: 12.9 % (ref 11.8–14.4)
GFR, ESTIMATED: >90 ML/MIN/1.73M2
GLUCOSE BLD-MCNC: 114 MG/DL (ref 65–105)
GLUCOSE BLD-MCNC: 129 MG/DL (ref 65–105)
GLUCOSE BLD-MCNC: 133 MG/DL (ref 65–105)
GLUCOSE BLD-MCNC: 140 MG/DL (ref 65–105)
GLUCOSE BLD-MCNC: 147 MG/DL (ref 65–105)
GLUCOSE BLD-MCNC: 157 MG/DL (ref 65–105)
GLUCOSE BLD-MCNC: 172 MG/DL (ref 65–105)
GLUCOSE BLD-MCNC: 206 MG/DL (ref 65–105)
GLUCOSE BLD-MCNC: 220 MG/DL (ref 65–105)
GLUCOSE BLD-MCNC: 240 MG/DL (ref 65–105)
GLUCOSE SERPL-MCNC: 195 MG/DL (ref 70–99)
HCT VFR BLD AUTO: 32.1 % (ref 36.3–47.1)
HGB BLD-MCNC: 11.3 G/DL (ref 11.9–15.1)
IMM GRANULOCYTES # BLD AUTO: <0.03 K/UL (ref 0–0.3)
IMM GRANULOCYTES NFR BLD: 0 %
LYMPHOCYTES NFR BLD: 2.57 K/UL (ref 1.1–3.7)
LYMPHOCYTES RELATIVE PERCENT: 33 % (ref 24–43)
MAGNESIUM SERPL-MCNC: 1.8 MG/DL (ref 1.6–2.6)
MCH RBC QN AUTO: 30.6 PG (ref 25.2–33.5)
MCHC RBC AUTO-ENTMCNC: 35.2 G/DL (ref 25.2–33.5)
MCV RBC AUTO: 87 FL (ref 82.6–102.9)
MONOCYTES NFR BLD: 0.59 K/UL (ref 0.1–1.2)
MONOCYTES NFR BLD: 8 % (ref 3–12)
NEUTROPHILS NFR BLD: 57 % (ref 36–65)
NEUTS SEG NFR BLD: 4.41 K/UL (ref 1.5–8.1)
NRBC BLD-RTO: 0.3 PER 100 WBC
PHOSPHATE SERPL-MCNC: 1.2 MG/DL (ref 2.6–4.5)
PLATELET # BLD AUTO: 250 K/UL (ref 138–453)
PMV BLD AUTO: 10.1 FL (ref 8.1–13.5)
POTASSIUM SERPL-SCNC: 3.8 MMOL/L (ref 3.7–5.3)
RBC # BLD AUTO: 3.69 M/UL (ref 3.95–5.11)
SODIUM SERPL-SCNC: 137 MMOL/L (ref 135–144)
WBC OTHER # BLD: 7.7 K/UL (ref 3.5–11.3)

## 2024-09-25 PROCEDURE — 80048 BASIC METABOLIC PNL TOTAL CA: CPT

## 2024-09-25 PROCEDURE — 82010 KETONE BODYS QUAN: CPT

## 2024-09-25 PROCEDURE — 85025 COMPLETE CBC W/AUTO DIFF WBC: CPT

## 2024-09-25 PROCEDURE — 99231 SBSQ HOSP IP/OBS SF/LOW 25: CPT | Performed by: NURSE PRACTITIONER

## 2024-09-25 PROCEDURE — 2580000003 HC RX 258: Performed by: INTERNAL MEDICINE

## 2024-09-25 PROCEDURE — 84100 ASSAY OF PHOSPHORUS: CPT

## 2024-09-25 PROCEDURE — 6370000000 HC RX 637 (ALT 250 FOR IP): Performed by: NURSE PRACTITIONER

## 2024-09-25 PROCEDURE — 2500000003 HC RX 250 WO HCPCS: Performed by: INTERNAL MEDICINE

## 2024-09-25 PROCEDURE — 2580000003 HC RX 258: Performed by: NURSE PRACTITIONER

## 2024-09-25 PROCEDURE — 6360000002 HC RX W HCPCS: Performed by: NURSE PRACTITIONER

## 2024-09-25 PROCEDURE — 83735 ASSAY OF MAGNESIUM: CPT

## 2024-09-25 PROCEDURE — 82947 ASSAY GLUCOSE BLOOD QUANT: CPT

## 2024-09-25 PROCEDURE — 99238 HOSP IP/OBS DSCHRG MGMT 30/<: CPT | Performed by: INTERNAL MEDICINE

## 2024-09-25 RX ORDER — POTASSIUM CHLORIDE 1500 MG/1
40 TABLET, EXTENDED RELEASE ORAL PRN
Status: DISCONTINUED | OUTPATIENT
Start: 2024-09-25 | End: 2024-09-25 | Stop reason: HOSPADM

## 2024-09-25 RX ORDER — MAGNESIUM SULFATE IN WATER 40 MG/ML
2000 INJECTION, SOLUTION INTRAVENOUS PRN
Status: DISCONTINUED | OUTPATIENT
Start: 2024-09-25 | End: 2024-09-25 | Stop reason: HOSPADM

## 2024-09-25 RX ORDER — POTASSIUM CHLORIDE 7.45 MG/ML
10 INJECTION INTRAVENOUS PRN
Status: DISCONTINUED | OUTPATIENT
Start: 2024-09-25 | End: 2024-09-25 | Stop reason: HOSPADM

## 2024-09-25 RX ADMIN — LEVOTHYROXINE SODIUM 112 MCG: 0.11 TABLET ORAL at 08:43

## 2024-09-25 RX ADMIN — SODIUM PHOSPHATE, MONOBASIC, MONOHYDRATE AND SODIUM PHOSPHATE, DIBASIC, ANHYDROUS 20 MMOL: 142; 276 INJECTION, SOLUTION INTRAVENOUS at 09:27

## 2024-09-25 RX ADMIN — ENOXAPARIN SODIUM 40 MG: 100 INJECTION SUBCUTANEOUS at 08:42

## 2024-09-25 RX ADMIN — DEXTROSE AND SODIUM CHLORIDE: 5; 450 INJECTION, SOLUTION INTRAVENOUS at 04:55

## 2024-09-25 RX ADMIN — CALCIUM CHLORIDE 2000 MG: 100 INJECTION, SOLUTION INTRAVENOUS at 09:32

## 2024-09-25 RX ADMIN — NORGESTIMATE AND ETHINYL ESTRADIOL 1 TABLET: KIT at 09:37

## 2024-09-25 ASSESSMENT — PAIN DESCRIPTION - LOCATION: LOCATION: NECK

## 2024-09-25 ASSESSMENT — ENCOUNTER SYMPTOMS
ABDOMINAL PAIN: 0
SHORTNESS OF BREATH: 0
RESPIRATORY NEGATIVE: 1
DIARRHEA: 0

## 2024-09-25 ASSESSMENT — PAIN SCALES - GENERAL
PAINLEVEL_OUTOF10: 1
PAINLEVEL_OUTOF10: 0
PAINLEVEL_OUTOF10: 0

## 2024-09-25 ASSESSMENT — PAIN DESCRIPTION - ONSET: ONSET: ON-GOING

## 2024-09-25 ASSESSMENT — PAIN DESCRIPTION - DESCRIPTORS: DESCRIPTORS: SORE

## 2024-09-25 ASSESSMENT — PAIN DESCRIPTION - ORIENTATION: ORIENTATION: RIGHT

## 2024-09-25 ASSESSMENT — PAIN DESCRIPTION - PAIN TYPE: TYPE: ACUTE PAIN

## 2024-09-25 NOTE — DISCHARGE INSTR - DIET
Good nutrition is important when healing from an illness, injury, or surgery.  Follow any nutrition recommendations given to you during your hospital stay.   If you were given an oral nutrition supplement while in the hospital, continue to take this supplement at home.  You can take it with meals, in-between meals, and/or before bedtime. These supplements can be purchased at most local grocery stores, pharmacies, and chain [employer]-stores.   If you have any questions about your diet or nutrition, call the hospital and ask for the dietitian.  Regular Diabetic Diet.

## 2024-09-25 NOTE — PROGRESS NOTES
Hospitalist Progress Note    Patient:  Andre Diaz     YOB: 2000    MRN: [id number]   Admit date: 9/23/2024     Acct: [id number]     PCP: Lo Ibrahim DO CC--Interval History: DKA resolved---DM1--insulin pump therapy with OmniPOD--Dexcom--needs to watch for tubing failures more closely and respond more quickly to changes of condition.   Seen by diabetic educator.   Home 9.25.2024 following phosphate replacement.    Initial Felisha----resolved--now at a Stage 1 level.    Celiac disease---gluten free diet    Hypothyroidism---replacement level    See note below    All other ROS negative except noted in HPI    Diet:  ADULT DIET; Clear Liquid; 4 carb choices (60 gm/meal)    Medications:  Scheduled Meds:   Norgestim-Eth Estrad Triphasic  1 tablet Oral Daily    Insulin Pump - Bolus Dose   SubCUTAneous 4x Daily AC & HS    Insulin Pump - Basal Dose   SubCUTAneous Daily    levothyroxine  112 mcg Oral Daily    sodium chloride flush  5-40 mL IntraVENous 2 times per day    enoxaparin  40 mg SubCUTAneous Daily     Continuous Infusions:   insulin 0.8 Units/hr (09/25/24 0743)    sodium chloride 250 mL/hr at 09/23/24 2348    dextrose 5 % and 0.45 % NaCl 150 mL/hr at 09/25/24 0455    sodium chloride       PRN Meds:potassium chloride **OR** potassium alternative oral replacement **OR** potassium chloride, magnesium sulfate, sodium phosphate 15 mmol in sodium chloride 0.9 % 250 mL IVPB, dextrose bolus **OR** dextrose bolus, dextrose 5 % and 0.45 % NaCl, sodium chloride flush, sodium chloride, acetaminophen **OR** acetaminophen, ondansetron, prochlorperazine    Objective:  Labs:  CBC with Differential:    Lab Results   Component Value Date/Time    WBC 7.7 09/25/2024 06:15 AM    RBC 3.69 09/25/2024 06:15 AM    HGB 11.3 09/25/2024 06:15 AM    HCT 32.1 09/25/2024 06:15 AM     09/25/2024 06:15 AM    MCV 87.0 09/25/2024 06:15 AM    MCH 30.6 09/25/2024 06:15 AM    MCHC 35.2 09/25/2024 06:15 AM    RDW 12.9

## 2024-09-25 NOTE — PLAN OF CARE
Problem: Discharge Planning  Goal: Discharge to home or other facility with appropriate resources  9/24/2024 2349 by Sarai Hanks RN  Outcome: Progressing  9/24/2024 1131 by Laura Harrison RN  Outcome: Progressing  Flowsheets (Taken 9/24/2024 0900)  Discharge to home or other facility with appropriate resources:   Identify barriers to discharge with patient and caregiver   Arrange for needed discharge resources and transportation as appropriate   Identify discharge learning needs (meds, wound care, etc)   Refer to discharge planning if patient needs post-hospital services based on physician order or complex needs related to functional status, cognitive ability or social support system  Note: Plan for discharge back to home when appropriate     Problem: ABCDS Injury Assessment  Goal: Absence of physical injury  9/24/2024 2349 by Sarai Hanks RN  Outcome: Progressing  9/24/2024 1131 by Laura Harrison RN  Outcome: Progressing  Flowsheets (Taken 9/24/2024 1131)  Absence of Physical Injury: Implement safety measures based on patient assessment  Note: Safety maintained; remains free from injury at this time     Problem: Gastrointestinal - Adult  Goal: Minimal or absence of nausea and vomiting  9/24/2024 2349 by Sarai Hanks RN  Outcome: Progressing  9/24/2024 1131 by Laura Harrison RN  Outcome: Progressing  Flowsheets (Taken 9/24/2024 0900)  Minimal or absence of nausea and vomiting:   Administer IV fluids as ordered to ensure adequate hydration   Maintain NPO status until nausea and vomiting are resolved   Administer ordered antiemetic medications as needed   Provide nonpharmacologic comfort measures as appropriate  Note: No c/o nausea at this time     Problem: Metabolic/Fluid and Electrolytes - Adult  Goal: Electrolytes maintained within normal limits  9/24/2024 2349 by Sarai Hanks RN  Outcome: Progressing  9/24/2024 1131 by Laura Harrison RN  Outcome:

## 2024-09-25 NOTE — PROGRESS NOTES
Discharge instructions reviewed with patient. Instructed to keep appointment with PCP for tomorrow. Instructed on follow-up appointment with diabetic educator and the office will call you to schedule appointment. Medications reviewed and instructed on next doses due. Denies any further questions before discharge.

## 2024-09-25 NOTE — CONSULTS
Diabetic Education Consult Note    Patient:  Andre Diaz     YOB: 2000    MRN: [id number]   Admit date: 9/23/2024     Acct: [id number]     PCP: Lo Ibrahim DO    CC--Interval History: per hosptalist   \"The patient is a 23 y.o. female patient of Lo Ibrahim DO who presents from the ER with c/o nausea and vomiting for the past 2 days, and high blood sugars for the past 1 day. She reports she was recently exposed to influenza. She has an omnipod insulin pump and dexcom CGM.     In ER, she was found to be in DKA with blood glucose 500, pH 7.1, urine with ketones, anion gap 26, beta hydroxybutyrate 5.38. Influenza and covid negative. Right IJ CVC was placed in ER due to poor IV access. In ER, she received 2L IV fluids, IV insulin gtt with bolus, and zofran. Dr. Navarrete, ER, requested hospitalization at 2240.\"    Currently pt on manual mode with omnipod, insulin gtt is discontinued.     Review of Systems   Constitutional:  Positive for fatigue. Negative for unexpected weight change.   Eyes:  Negative for visual disturbance.   Respiratory: Negative.  Negative for shortness of breath.    Cardiovascular:  Negative for chest pain and leg swelling.   Gastrointestinal:  Negative for abdominal pain and diarrhea.   Endocrine: Negative for polydipsia, polyphagia and polyuria.   Genitourinary: Negative.    Musculoskeletal: Negative.    Skin:  Negative for rash and wound.   Neurological:  Negative for dizziness, tremors, seizures and headaches.   Psychiatric/Behavioral: Negative.  Negative for confusion and decreased concentration.      All other ROS negative except noted in HPI    Diet:  ADULT DIET; Regular; 3 carb choices (45 gm/meal); Gluten Free    Medications:  Scheduled Meds:   sodium phosphate IVPB (PERIPHERAL line)  20 mmol IntraVENous Once    Norgestim-Eth Estrad Triphasic  1 tablet Oral Daily    Insulin Pump - Bolus Dose   SubCUTAneous 4x Daily AC & HS    levothyroxine  112 mcg Oral

## 2024-09-26 ENCOUNTER — OFFICE VISIT (OUTPATIENT)
Dept: FAMILY MEDICINE CLINIC | Age: 24
End: 2024-09-26
Payer: COMMERCIAL

## 2024-09-26 ENCOUNTER — FOLLOWUP TELEPHONE ENCOUNTER (OUTPATIENT)
Dept: INPATIENT UNIT | Age: 24
End: 2024-09-26

## 2024-09-26 ENCOUNTER — TELEPHONE (OUTPATIENT)
Dept: FAMILY MEDICINE CLINIC | Age: 24
End: 2024-09-26

## 2024-09-26 VITALS
HEIGHT: 65 IN | HEART RATE: 84 BPM | OXYGEN SATURATION: 99 % | WEIGHT: 157 LBS | SYSTOLIC BLOOD PRESSURE: 104 MMHG | TEMPERATURE: 97.5 F | BODY MASS INDEX: 26.16 KG/M2 | RESPIRATION RATE: 18 BRPM | DIASTOLIC BLOOD PRESSURE: 80 MMHG

## 2024-09-26 DIAGNOSIS — E55.9 VITAMIN D DEFICIENCY: ICD-10-CM

## 2024-09-26 DIAGNOSIS — E10.10 DIABETIC KETOACIDOSIS WITHOUT COMA ASSOCIATED WITH TYPE 1 DIABETES MELLITUS (HCC): Primary | ICD-10-CM

## 2024-09-26 DIAGNOSIS — Z09 HOSPITAL DISCHARGE FOLLOW-UP: ICD-10-CM

## 2024-09-26 DIAGNOSIS — E03.9 HYPOTHYROIDISM, UNSPECIFIED TYPE: ICD-10-CM

## 2024-09-26 DIAGNOSIS — E10.9 TYPE 1 DIABETES MELLITUS WITHOUT COMPLICATION (HCC): ICD-10-CM

## 2024-09-26 DIAGNOSIS — E10.69 TYPE 1 DIABETES MELLITUS WITH OTHER SPECIFIED COMPLICATION (HCC): ICD-10-CM

## 2024-09-26 PROCEDURE — 1111F DSCHRG MED/CURRENT MED MERGE: CPT | Performed by: FAMILY MEDICINE

## 2024-09-26 PROCEDURE — 99214 OFFICE O/P EST MOD 30 MIN: CPT | Performed by: FAMILY MEDICINE

## 2024-09-26 RX ORDER — LEVOTHYROXINE SODIUM 100 UG/1
100 TABLET ORAL DAILY
Qty: 90 TABLET | Refills: 3 | Status: SHIPPED | OUTPATIENT
Start: 2024-09-26

## 2024-09-26 RX ORDER — INSULIN LISPRO 100 [IU]/ML
INJECTION, SOLUTION INTRAVENOUS; SUBCUTANEOUS
Qty: 30 ML | Refills: 3 | Status: SHIPPED | OUTPATIENT
Start: 2024-09-26

## 2024-09-26 RX ORDER — INSULIN LISPRO 100 [IU]/ML
INJECTION, SOLUTION INTRAVENOUS; SUBCUTANEOUS
Qty: 30 ML | Refills: 1 | Status: CANCELLED | OUTPATIENT
Start: 2024-09-26

## 2024-09-26 SDOH — ECONOMIC STABILITY: FOOD INSECURITY: WITHIN THE PAST 12 MONTHS, THE FOOD YOU BOUGHT JUST DIDN'T LAST AND YOU DIDN'T HAVE MONEY TO GET MORE.: NEVER TRUE

## 2024-09-26 SDOH — ECONOMIC STABILITY: FOOD INSECURITY: WITHIN THE PAST 12 MONTHS, YOU WORRIED THAT YOUR FOOD WOULD RUN OUT BEFORE YOU GOT MONEY TO BUY MORE.: NEVER TRUE

## 2024-09-26 SDOH — ECONOMIC STABILITY: INCOME INSECURITY: HOW HARD IS IT FOR YOU TO PAY FOR THE VERY BASICS LIKE FOOD, HOUSING, MEDICAL CARE, AND HEATING?: NOT HARD AT ALL

## 2024-09-26 ASSESSMENT — PATIENT HEALTH QUESTIONNAIRE - PHQ9
SUM OF ALL RESPONSES TO PHQ9 QUESTIONS 1 & 2: 0
2. FEELING DOWN, DEPRESSED OR HOPELESS: NOT AT ALL
SUM OF ALL RESPONSES TO PHQ QUESTIONS 1-9: 0
SUM OF ALL RESPONSES TO PHQ QUESTIONS 1-9: 0
1. LITTLE INTEREST OR PLEASURE IN DOING THINGS: NOT AT ALL
SUM OF ALL RESPONSES TO PHQ QUESTIONS 1-9: 0
SUM OF ALL RESPONSES TO PHQ QUESTIONS 1-9: 0

## 2024-09-26 NOTE — DISCHARGE SUMMARY
[address] [place] [address]                            DISCHARGE SUMMARY      PATIENT NAME: VELMA MITCHELL              : 2000  MED REC NO: [id number]                         ROOM: 0218  ACCOUNT NO: [id number]                       ADMIT DATE: 2024  PROVIDER: Riccardo Judd MD      PRIMARY CARE PHYSICIAN:  Lo Ibrahim MD    PERSONAL PHYSICIAN:  Lo Ibrahim D.O., [facility].  The patient is seen by Nicole Velez, my nurse practitioner, diabetic educator.    DIAGNOSES:    1. Diabetic ketoacidosis 2024, diabetes mellitus type 1, uncontrolled, recurrent event.  2. Celiac disease.  3. Chronic lymphocytic thyroiditis, now with hypothyroidism, on replacement therapy.  4. Overweight.  5. Marijuana use, quit in .  6. Secondhand smoke exposure.  7. Hypocalcemia, hypophosphatemia.  8. Other medical problems set forth in the progress note of 2024 incorporated for reference herein.    HISTORY OF PRESENT ILLNESS AND HOSPITAL COURSE:  The patient is a 23-year-old white female, patient of Lo Ibrahim D.O., [facility] and Nicole Velez, nurse practitioner, diabetic education.      The patient presented with recurrent event of diabetic ketoacidosis, most likely related to her tubing.      I note from the record that the patient's mother is a guardian for this patient.  Hence, the patient's overall decision making capabilities may be somewhat limited.      In any event, the patient presented, was on an insulin drip.  Subsequently with the re-initiation of her insulin pump, difficulty with IV access, required a right IJ line on 2024 by the emergency department physician.  The patient's blood glucose levels gradually came down.  The patient was seen by the diabetic educator.  Adjustments were made in the patient's pump.    The patient able to discharged to home  medications.  The patient and/or the patient's personal representatives were specifically advised the only medications to be taken are those set forth in the discharge orders and no other medications should be taken.  Any prior medications not on the discharge orders are specifically discontinued.          AILIN HASTINGS MD      D:  09/26/2024 08:56:41     T:  09/26/2024 09:55:33     RUBEN/SRIRAM  Job #:  [id number]     Doc#:  [id number]

## 2024-09-30 ASSESSMENT — ENCOUNTER SYMPTOMS
CONSTIPATION: 0
COUGH: 0
SINUS PRESSURE: 0
ABDOMINAL PAIN: 0
EYE DISCHARGE: 0
WHEEZING: 0
DIARRHEA: 0
NAUSEA: 0
RHINORRHEA: 0
SHORTNESS OF BREATH: 0
SORE THROAT: 0
EYE REDNESS: 0
VOMITING: 0
TROUBLE SWALLOWING: 0

## 2024-09-30 NOTE — PROGRESS NOTES
Post-Discharge Transitional Care Follow Up      Andre Diaz   YOB: 2000    Date of Office Visit:  9/26/2024  Date of Hospital Admission: 9/23/24  Date of Hospital Discharge: 9/25/24  Readmission Risk Score (high >=14%. Medium >=10%):Readmission Risk Score: 9.2      Care management risk score Rising risk (score 2-5) and Complex Care (Scores >=6): No Risk Score On File     Non face to face  following discharge, date last encounter closed (first attempt may have been earlier): 09/26/2024     Call initiated 2 business days of discharge: Yes     Diabetic ketoacidosis without coma associated with type 1 diabetes mellitus (HCC)  Type 1 diabetes mellitus with other specified complication (HCC)  -     Comprehensive Metabolic Panel; Future  Hypothyroidism, unspecified type  -     levothyroxine (SYNTHROID) 100 MCG tablet; Take 1 tablet by mouth daily, Disp-90 tablet, R-3Normal  -     TSH; Future  -     T4, Free; Future  Vitamin D deficiency  -     Vitamin D 25 Hydroxy; Future  Hospital discharge follow-up  -     [place] DISCHARGE MEDS RECONCILED W/ CURRENT OUTPATIENT MED LIST      Medical Decision Making: moderate complexity  Return in about 1 year (around 9/26/2025).           Subjective:   HPI    Patient is seen today for transitional care follow-up from recent hospitalization secondary to DKA.  Patient has always kept her blood sugars reasonably well-controlled but was having trouble with not feeling well and her blood sugars are running high despite continuing with her OmniPod pump.  She did keep getting more and more ill and ultimately went to the emergency room and was found to be in DKA.  This suspected etiology was that her pump was not working appropriately.  She was admitted for further management and care.  Was given IV fluid hydration and blood sugars were improved with insulin drip.  Once her blood sugars had stabilized she was feeling better and was discharged to home with reinitiation of her

## 2024-10-09 ENCOUNTER — OFFICE VISIT (OUTPATIENT)
Dept: DIABETES SERVICES | Age: 24
End: 2024-10-09
Payer: COMMERCIAL

## 2024-10-09 VITALS
OXYGEN SATURATION: 100 % | BODY MASS INDEX: 26.79 KG/M2 | WEIGHT: 160.8 LBS | DIASTOLIC BLOOD PRESSURE: 68 MMHG | HEART RATE: 78 BPM | SYSTOLIC BLOOD PRESSURE: 116 MMHG | HEIGHT: 65 IN

## 2024-10-09 DIAGNOSIS — E10.9 TYPE 1 DIABETES MELLITUS WITHOUT COMPLICATION (HCC): Primary | ICD-10-CM

## 2024-10-09 DIAGNOSIS — E03.9 HYPOTHYROIDISM, UNSPECIFIED TYPE: ICD-10-CM

## 2024-10-09 PROCEDURE — 99214 OFFICE O/P EST MOD 30 MIN: CPT | Performed by: NURSE PRACTITIONER

## 2024-10-09 PROCEDURE — 1111F DSCHRG MED/CURRENT MED MERGE: CPT | Performed by: NURSE PRACTITIONER

## 2024-10-09 PROCEDURE — G8484 FLU IMMUNIZE NO ADMIN: HCPCS | Performed by: NURSE PRACTITIONER

## 2024-10-09 PROCEDURE — G8427 DOCREV CUR MEDS BY ELIG CLIN: HCPCS | Performed by: NURSE PRACTITIONER

## 2024-10-09 PROCEDURE — 2022F DILAT RTA XM EVC RTNOPTHY: CPT | Performed by: NURSE PRACTITIONER

## 2024-10-09 PROCEDURE — 3051F HG A1C>EQUAL 7.0%<8.0%: CPT | Performed by: NURSE PRACTITIONER

## 2024-10-09 PROCEDURE — G8419 CALC BMI OUT NRM PARAM NOF/U: HCPCS | Performed by: NURSE PRACTITIONER

## 2024-10-09 PROCEDURE — 99212 OFFICE O/P EST SF 10 MIN: CPT | Performed by: NURSE PRACTITIONER

## 2024-10-09 PROCEDURE — G2211 COMPLEX E/M VISIT ADD ON: HCPCS | Performed by: NURSE PRACTITIONER

## 2024-10-09 PROCEDURE — 95251 CONT GLUC MNTR ANALYSIS I&R: CPT | Performed by: NURSE PRACTITIONER

## 2024-10-09 PROCEDURE — 1036F TOBACCO NON-USER: CPT | Performed by: NURSE PRACTITIONER

## 2024-10-09 RX ORDER — INSULIN PMP CART,AUT,G6/7,CNTR
1 EACH SUBCUTANEOUS
Qty: 3 EACH | Refills: 5 | Status: SHIPPED | OUTPATIENT
Start: 2024-10-09

## 2024-10-09 ASSESSMENT — ENCOUNTER SYMPTOMS
RESPIRATORY NEGATIVE: 1
SHORTNESS OF BREATH: 0
DIARRHEA: 0
ABDOMINAL PAIN: 0

## 2024-10-09 NOTE — PROGRESS NOTES
[facility]PART[facility] OF [facility]  [address] [address] [phone]        HISTORY:    Andre Diaz presents today for evaluation and management of:  Chief Complaint   Patient presents with    Diabetes       Patient Care Team:  Lo Ibrahim DO as PCP - General (Family Medicine)  Lo Ibrahim DO as PCP - Empaneled Provider      Interval History:    Past DM Medications   MDI     Current Diabetic Medications  Humalog for use with omnipod TDD 60 units     DKA episodes: 2009 DKA with diagnosis after H1N1 vaccine. 2012 DKA unknown cause. 2022 DKA due to illness    08/26/24   Andre Diaz is a 23 y.o. female patient who presents to clinic today for her diabetes. she has a history of wide excursions, hypothyroidism and celiac disease which contributes to her diabetes. At previous visit diabetes counseling was provided. she denies any current signs or symptoms of hyper/hypoglycemia. she states they are taking their medications as prescribed without any adverse effects.   Diet: counting carbs 30-40 per meal  Exercise: none  BS testing: uses cgm daily with success and is adherent to cgm therapy  Hypoglycemia: No  Hypoglycemia as needed treatment: snack, juice or emergency glucagon  Issues:   Diabetic foot exam up-to-date: Yes  Diabetic retinal exam up-to-date: No     Diabetes complications:none    10/09/24   Andre Diaz is a 23 y.o. female patient who presents to clinic today for her diabetes. she has a history of wide excursions, hypothyroidism and celiac disease  which contributes to her diabetes. At previous visit diabetes counseling was provided. she denies any current signs or symptoms of hyper/hypoglycemia. she states they are taking their medications as prescribed without any adverse effects.   Diet: counting carbs 30-40 per meal  Exercise: none  BS testing: uses cgm daily with success and is adherent to

## 2024-10-22 DIAGNOSIS — Z30.41 ENCOUNTER FOR SURVEILLANCE OF CONTRACEPTIVE PILLS: ICD-10-CM

## 2024-10-22 NOTE — TELEPHONE ENCOUNTER
LAST APPT THIS DEPT  12/11/2023 - Comp  NEXT APPT  With Obstetrics and Gynecology (MARIA D Reese - HEL[facility])  12/16/2024 at 2:00 PM

## 2024-10-30 RX ORDER — NORGESTIMATE AND ETHINYL ESTRADIOL
1 KIT DAILY
Qty: 84 TABLET | Refills: 0 | Status: SHIPPED | OUTPATIENT
Start: 2024-10-30

## 2024-12-02 ENCOUNTER — OFFICE VISIT (OUTPATIENT)
Dept: DIABETES SERVICES | Age: 24
End: 2024-12-02
Payer: COMMERCIAL

## 2024-12-02 VITALS
BODY MASS INDEX: 25.99 KG/M2 | WEIGHT: 156 LBS | HEIGHT: 65 IN | OXYGEN SATURATION: 97 % | DIASTOLIC BLOOD PRESSURE: 74 MMHG | HEART RATE: 114 BPM | SYSTOLIC BLOOD PRESSURE: 128 MMHG

## 2024-12-02 DIAGNOSIS — E10.9 TYPE 1 DIABETES MELLITUS WITHOUT COMPLICATION (HCC): Primary | ICD-10-CM

## 2024-12-02 DIAGNOSIS — E03.9 HYPOTHYROIDISM, UNSPECIFIED TYPE: ICD-10-CM

## 2024-12-02 PROCEDURE — G8419 CALC BMI OUT NRM PARAM NOF/U: HCPCS | Performed by: NURSE PRACTITIONER

## 2024-12-02 PROCEDURE — 99212 OFFICE O/P EST SF 10 MIN: CPT | Performed by: NURSE PRACTITIONER

## 2024-12-02 PROCEDURE — G8484 FLU IMMUNIZE NO ADMIN: HCPCS | Performed by: NURSE PRACTITIONER

## 2024-12-02 PROCEDURE — 99214 OFFICE O/P EST MOD 30 MIN: CPT | Performed by: NURSE PRACTITIONER

## 2024-12-02 PROCEDURE — G2211 COMPLEX E/M VISIT ADD ON: HCPCS | Performed by: NURSE PRACTITIONER

## 2024-12-02 PROCEDURE — 95251 CONT GLUC MNTR ANALYSIS I&R: CPT | Performed by: NURSE PRACTITIONER

## 2024-12-02 PROCEDURE — 1036F TOBACCO NON-USER: CPT | Performed by: NURSE PRACTITIONER

## 2024-12-02 PROCEDURE — G8428 CUR MEDS NOT DOCUMENT: HCPCS | Performed by: NURSE PRACTITIONER

## 2024-12-02 PROCEDURE — 2022F DILAT RTA XM EVC RTNOPTHY: CPT | Performed by: NURSE PRACTITIONER

## 2024-12-02 PROCEDURE — 3051F HG A1C>EQUAL 7.0%<8.0%: CPT | Performed by: NURSE PRACTITIONER

## 2024-12-02 NOTE — PROGRESS NOTES
(TRUETEST TEST) strip Use to test blood sugars 5 times daily 200 each 0    Lancets (BD LANCET ULTRAFINE 30G) MISC Use to test blood sugar 5 times daily 200 each 11    Insulin Pen Needle (B-D ULTRAFINE III SHORT PEN) 31G X 8 MM MISC Use to inject insulin 6 times daily 200 each 6    acetone, urine, test (KETOSTIX) strip Test daily as needed for ketones 50 strip 2    vitamin D (CHOLECALCIFEROL) 25 MCG (1000 UT) TABS tablet Take 1 tablet by mouth daily      BAQSIMI ONE PACK 3 MG/DOSE POWD       B-D INS SYR ULTRAFINE 1CC/31G 31G X 5/16\" 1 ML MISC       ULTRA-COMFORT INSULIN SYRINGE 31G X 5/16\" 0.5 ML MISC USE ONCE DAILY WITH LANTUS 30 each 3    glucagon (GLUCAGON EMERGENCY) 1 MG injection As directed for extreme hypoglycemia 1 kit 2     No current facility-administered medications for this visit.       Review ofSymptoms:  Review of Systems      /74 (Site: Right Upper Arm, Position: Sitting)   Pulse (!) 114   Ht 1.651 m (5' 5\")   Wt 70.8 kg (156 lb)   LMP 11/26/2024 (Exact Date)   SpO2 97%   BMI 25.96 kg/m²   Wt Readings from Last 3 Encounters:   12/02/24 70.8 kg (156 lb)   10/09/24 72.9 kg (160 lb 12.8 oz)   09/26/24 71.2 kg (157 lb)     Body mass index is 25.96 kg/m².      Physical Exam    Physical Exam  Results  Laboratory Studies  Last A1c was 7.4. Predicted A1c is 7.3%. Average blood sugar is 155.  CGM report downloaded and reviewed from the past 2 weeks scanned to media tab.   Insulin pump settings downloaded and reviewed. Scanned into media tab.     Hemoglobin A1C   Date Value Ref Range Status   09/24/2024 7.4 (H) 4.0 - 6.0 % Final      Results       ** No results found for the last 336 hours. **          Lab Results   Component Value Date/Time     09/25/2024 06:15 AM    K 3.8 09/25/2024 06:15 AM     09/25/2024 06:15 AM    CO2 18 09/25/2024 06:15 AM    BUN 4 09/25/2024 06:15 AM    CREATININE 0.6 09/25/2024 06:15 AM    GLUCOSE 195 09/25/2024 06:15 AM    GLUCOSE 187 04/12/2012 07:30 AM

## 2024-12-05 DIAGNOSIS — E10.9 TYPE 1 DIABETES MELLITUS WITHOUT COMPLICATION (HCC): ICD-10-CM

## 2024-12-05 RX ORDER — INSULIN PMP CART,AUT,G6/7,CNTR
EACH SUBCUTANEOUS
Qty: 10 EACH | Refills: 5 | Status: SHIPPED | OUTPATIENT
Start: 2024-12-05

## 2024-12-16 ENCOUNTER — HOSPITAL ENCOUNTER (OUTPATIENT)
Age: 24
Setting detail: SPECIMEN
Discharge: HOME | End: 2024-12-16
Payer: COMMERCIAL

## 2024-12-16 ENCOUNTER — OFFICE VISIT (OUTPATIENT)
Dept: OBGYN | Age: 24
End: 2024-12-16
Payer: COMMERCIAL

## 2024-12-16 VITALS
HEIGHT: 66 IN | HEART RATE: 61 BPM | OXYGEN SATURATION: 99 % | SYSTOLIC BLOOD PRESSURE: 110 MMHG | WEIGHT: 156.6 LBS | BODY MASS INDEX: 25.17 KG/M2 | DIASTOLIC BLOOD PRESSURE: 66 MMHG

## 2024-12-16 DIAGNOSIS — Z12.4 ENCOUNTER FOR SCREENING FOR MALIGNANT NEOPLASM OF CERVIX: ICD-10-CM

## 2024-12-16 DIAGNOSIS — Z11.3 ROUTINE SCREENING FOR STI (SEXUALLY TRANSMITTED INFECTION): ICD-10-CM

## 2024-12-16 DIAGNOSIS — K02.9 DENTAL CARIES: ICD-10-CM

## 2024-12-16 DIAGNOSIS — Z01.419 WELL WOMAN EXAM WITH ROUTINE GYNECOLOGICAL EXAM: Primary | ICD-10-CM

## 2024-12-16 DIAGNOSIS — Z30.41 ENCOUNTER FOR SURVEILLANCE OF CONTRACEPTIVE PILLS: ICD-10-CM

## 2024-12-16 PROCEDURE — G8427 DOCREV CUR MEDS BY ELIG CLIN: HCPCS | Performed by: ADVANCED PRACTICE MIDWIFE

## 2024-12-16 PROCEDURE — 1036F TOBACCO NON-USER: CPT | Performed by: ADVANCED PRACTICE MIDWIFE

## 2024-12-16 PROCEDURE — 99395 PREV VISIT EST AGE 18-39: CPT

## 2024-12-16 PROCEDURE — 99213 OFFICE O/P EST LOW 20 MIN: CPT | Performed by: ADVANCED PRACTICE MIDWIFE

## 2024-12-16 PROCEDURE — G8484 FLU IMMUNIZE NO ADMIN: HCPCS | Performed by: ADVANCED PRACTICE MIDWIFE

## 2024-12-16 PROCEDURE — G8419 CALC BMI OUT NRM PARAM NOF/U: HCPCS | Performed by: ADVANCED PRACTICE MIDWIFE

## 2024-12-16 PROCEDURE — 87491 CHLMYD TRACH DNA AMP PROBE: CPT

## 2024-12-16 PROCEDURE — 87591 N.GONORRHOEAE DNA AMP PROB: CPT

## 2024-12-16 PROCEDURE — G0145 SCR C/V CYTO,THINLAYER,RESCR: HCPCS

## 2024-12-16 PROCEDURE — 99395 PREV VISIT EST AGE 18-39: CPT | Performed by: ADVANCED PRACTICE MIDWIFE

## 2024-12-16 RX ORDER — NORGESTIMATE AND ETHINYL ESTRADIOL 7DAYSX3 LO
1 KIT ORAL DAILY
Qty: 84 TABLET | Refills: 4 | Status: SHIPPED | OUTPATIENT
Start: 2024-12-16

## 2024-12-16 ASSESSMENT — ENCOUNTER SYMPTOMS
EYES NEGATIVE: 1
GASTROINTESTINAL NEGATIVE: 1
RESPIRATORY NEGATIVE: 1

## 2024-12-16 ASSESSMENT — PATIENT HEALTH QUESTIONNAIRE - PHQ9
1. LITTLE INTEREST OR PLEASURE IN DOING THINGS: NOT AT ALL
2. FEELING DOWN, DEPRESSED OR HOPELESS: NOT AT ALL
SUM OF ALL RESPONSES TO PHQ9 QUESTIONS 1 & 2: 0
SUM OF ALL RESPONSES TO PHQ QUESTIONS 1-9: 0

## 2024-12-16 NOTE — PROGRESS NOTES
04/07/24 0330   Treatment   Treatment Type SLED   Treatment Status Order change;Daily equipment check   Dialysis Machine Number [id number]   Dialyzer Time (hours) 2.24   BVP (Liters) 28.5 L   Solutions Labeled and Current  Yes   Access Temporary Cath;Right;IJ   Catheter Dressing Intact  Yes   Alarms Engaged Yes   CRRT Comments 2K bath changed to 4K bath, daily check done     Bath switched to 4K per MD orders. Noted positive TMP (+20) may be indicative of dialyzer starting to clot/clog. Advised primary nurse to rinse pt back if TMP gets higher. Daily equipment check and maintenance done.   Subjective:      Patient ID: Andre Diaz  is a 24 y.o. y.o. female.    Andre presents with her mother for annual examination. She reports monthly menses, bleeding for 4-5 days requiring a maxi pad change twice daily. She reports small stringy blood clots and menstrual pain she would rate 5-6/10. She occasionally takes tylenol for the pain. She is sexually active and reports condom use to protect against unintended pregnancy. She also reports good cycle control on the pill and desires continued use.              Review of Systems   Constitutional: Negative.    HENT: Negative.     Eyes: Negative.    Respiratory: Negative.     Cardiovascular: Negative.    Gastrointestinal: Negative.    Genitourinary:  Positive for menstrual problem.   Musculoskeletal: Negative.    Skin: Negative.    Neurological: Negative.    Psychiatric/Behavioral: Negative.       Breast ROS: negative for breast lumps  Family H/O Breast Cancer: MGM age 70's, BRACA positive, mother negative carrier   Objective:   /66 (Site: Right Upper Arm, Position: Sitting, Cuff Size: Medium Adult)   Pulse 61   Ht 1.665 m (5' 5.55\")   Wt 71 kg (156 lb 9.6 oz)   LMP 12/09/2024 (Exact Date)   SpO2 99%   Breastfeeding No   BMI 25.62 kg/m²   Physical Exam  Constitutional:       Appearance: She is well-developed and normal weight.   HENT:      Head: Normocephalic and atraumatic.   Eyes:      Conjunctiva/sclera: Conjunctivae normal.      Pupils: Pupils are equal, round, and reactive to light.   Cardiovascular:      Rate and Rhythm: Normal rate and regular rhythm.      Heart sounds: Normal heart sounds.   Pulmonary:      Effort: Pulmonary effort is normal.      Breath sounds: Normal breath sounds.   Chest:   Breasts:     Breasts are symmetrical.      Right: No inverted nipple, mass, nipple discharge, skin change or tenderness.      Left: No inverted nipple, mass, nipple discharge, skin change or tenderness.   Abdominal:      General: Bowel sounds are normal.

## 2024-12-17 LAB
C TRACH DNA SPEC QL PROBE+SIG AMP: NEGATIVE
N GONORRHOEA DNA SPEC QL PROBE+SIG AMP: NEGATIVE
SPECIMEN DESCRIPTION: NORMAL

## 2025-01-07 LAB — CYTOLOGY REPORT: NORMAL

## 2025-02-24 RX ORDER — INSULIN LISPRO 100 [IU]/ML
INJECTION, SOLUTION INTRAVENOUS; SUBCUTANEOUS
Qty: 30 ML | Refills: 3 | Status: SHIPPED | OUTPATIENT
Start: 2025-02-24

## 2025-03-31 ENCOUNTER — OFFICE VISIT (OUTPATIENT)
Dept: DIABETES SERVICES | Age: 25
End: 2025-03-31
Payer: COMMERCIAL

## 2025-03-31 VITALS
BODY MASS INDEX: 26.99 KG/M2 | WEIGHT: 162 LBS | SYSTOLIC BLOOD PRESSURE: 116 MMHG | DIASTOLIC BLOOD PRESSURE: 80 MMHG | OXYGEN SATURATION: 99 % | HEIGHT: 65 IN | HEART RATE: 85 BPM

## 2025-03-31 DIAGNOSIS — E03.9 HYPOTHYROIDISM, UNSPECIFIED TYPE: ICD-10-CM

## 2025-03-31 DIAGNOSIS — E10.9 TYPE 1 DIABETES MELLITUS WITHOUT COMPLICATION: Primary | ICD-10-CM

## 2025-03-31 PROCEDURE — 2022F DILAT RTA XM EVC RTNOPTHY: CPT | Performed by: NURSE PRACTITIONER

## 2025-03-31 PROCEDURE — G2211 COMPLEX E/M VISIT ADD ON: HCPCS | Performed by: NURSE PRACTITIONER

## 2025-03-31 PROCEDURE — G8419 CALC BMI OUT NRM PARAM NOF/U: HCPCS | Performed by: NURSE PRACTITIONER

## 2025-03-31 PROCEDURE — 1036F TOBACCO NON-USER: CPT | Performed by: NURSE PRACTITIONER

## 2025-03-31 PROCEDURE — G8427 DOCREV CUR MEDS BY ELIG CLIN: HCPCS | Performed by: NURSE PRACTITIONER

## 2025-03-31 PROCEDURE — 3046F HEMOGLOBIN A1C LEVEL >9.0%: CPT | Performed by: NURSE PRACTITIONER

## 2025-03-31 PROCEDURE — 99212 OFFICE O/P EST SF 10 MIN: CPT | Performed by: NURSE PRACTITIONER

## 2025-03-31 PROCEDURE — 99214 OFFICE O/P EST MOD 30 MIN: CPT | Performed by: NURSE PRACTITIONER

## 2025-03-31 ASSESSMENT — ENCOUNTER SYMPTOMS
SHORTNESS OF BREATH: 0
RESPIRATORY NEGATIVE: 1

## 2025-03-31 NOTE — PROGRESS NOTES
Patient Care Team:  Lo Ibrahim DO as PCP - General (Family Medicine)  Lo Ibrahim DO as PCP - Empaneled Provider    Past DM Medications   MDI     Current Diabetic Medications  Humalog for use with omnipod TDD 60 units     DKA episodes: 2009 DKA with diagnosis after H1N1 vaccine. 2012 DKA unknown cause. 2022 DKA due to illness    History of Present Illness  The patient presents for evaluation and management of diabetes mellitus and thyroid dysfunction.    Diabetes management includes the administration of Baqsimi (nasal glucagon) and emergency glucagon injection. The patient utilizes Humalog (insulin lispro) in conjunction with an Omnipod insulin delivery system, and continuous glucose monitoring is performed using the Dexcom G6 system. The patient reports an average daily insulin usage of 47 units, with 65% derived from basal insulin and 35% from bolus insulin. Recent fluctuations in blood glucose levels have been observed, which the patient attributes to the premenstrual period. The patient confirms that supplies, including insulin, are adequate.    Thyroid management involves the daily administration of Synthroid (levothyroxine) at a dosage of 100 mcg.      ICD-10-CM    1. Type 1 diabetes mellitus without complication (HCC)  E10.9       2. Hypothyroidism, unspecified type  E03.9           Assessment & Plan  1. Health maintenance: Stable.  - Blood pressure and heart rate are within normal ranges.  - Last A1c was in 09/2024.  - Full panel including urine test, cholesterol, and comprehensive A1c is due in 04/2025.  - Complete lab work at the earliest convenience.    2. Diabetes Mellitus: Stable.  - Omnipod report indicates an average daily insulin usage of 47 units from 03/18/2025 through 03/31/2025, with a distribution of 65% basal insulin and 35% bolus insulin.  - Automated mode 95% of the time.  - Instances of not consuming carbohydrates or meals leading to significant spikes in blood glucose

## 2025-05-04 DIAGNOSIS — E10.9 TYPE 1 DIABETES MELLITUS WITHOUT COMPLICATION (HCC): ICD-10-CM

## 2025-05-05 RX ORDER — INSULIN PMP CART,AUT,G6/7,CNTR
EACH SUBCUTANEOUS
Qty: 10 EACH | Refills: 5 | Status: SHIPPED | OUTPATIENT
Start: 2025-05-05

## 2025-06-19 ENCOUNTER — HOSPITAL ENCOUNTER (OUTPATIENT)
Age: 25
Discharge: HOME | End: 2025-06-19
Payer: COMMERCIAL

## 2025-06-19 ENCOUNTER — RESULTS FOLLOW-UP (OUTPATIENT)
Dept: OBGYN | Age: 25
End: 2025-06-19

## 2025-06-19 ENCOUNTER — OFFICE VISIT (OUTPATIENT)
Dept: OBGYN | Age: 25
End: 2025-06-19

## 2025-06-19 VITALS
DIASTOLIC BLOOD PRESSURE: 66 MMHG | SYSTOLIC BLOOD PRESSURE: 103 MMHG | HEART RATE: 72 BPM | OXYGEN SATURATION: 98 % | BODY MASS INDEX: 26.99 KG/M2 | WEIGHT: 162 LBS | HEIGHT: 65 IN

## 2025-06-19 DIAGNOSIS — Z32.00 UNCONFIRMED PREGNANCY: ICD-10-CM

## 2025-06-19 DIAGNOSIS — Z34.90 EARLY STAGE OF PREGNANCY: ICD-10-CM

## 2025-06-19 DIAGNOSIS — Z32.00 UNCONFIRMED PREGNANCY: Primary | ICD-10-CM

## 2025-06-19 LAB — B-HCG SERPL EIA 3RD IS-ACNC: ABNORMAL MIU/ML (ref 0–7)

## 2025-06-19 PROCEDURE — 84702 CHORIONIC GONADOTROPIN TEST: CPT

## 2025-06-19 PROCEDURE — 36415 COLL VENOUS BLD VENIPUNCTURE: CPT

## 2025-06-19 ASSESSMENT — ENCOUNTER SYMPTOMS
RESPIRATORY NEGATIVE: 1
GASTROINTESTINAL NEGATIVE: 1
EYES NEGATIVE: 1

## 2025-06-19 NOTE — PROGRESS NOTES
pregnancy  C.trachomatis N.gonorrhoeae DNA, Urine    Culture, Urine    Hepatitis C Antibody    HIV Screen    Prenatal Profile I    T4, Free    TSH    Urinalysis    Urine Drug Screen    Varicella Zoster Antibody, IgG    Vitamin D 25 Hydroxy    Hemoglobin A1C              Plan:     Orders Placed This Encounter   Procedures    C.trachomatis N.gonorrhoeae DNA, Urine     Standing Status:   Future     Expected Date:   6/19/2025     Expiration Date:   6/19/2026    Culture, Urine     Standing Status:   Future     Expected Date:   6/19/2025     Expiration Date:   6/19/2026    hCG, Quantitative, Pregnancy     Standing Status:   Future     Number of Occurrences:   1     Expected Date:   6/19/2025     Expiration Date:   6/19/2026    Hepatitis C Antibody     Standing Status:   Future     Expected Date:   6/19/2025     Expiration Date:   6/19/2026    HIV Screen     Standing Status:   Future     Expected Date:   6/19/2025     Expiration Date:   6/19/2026    Prenatal Profile I     Standing Status:   Future     Expected Date:   6/19/2025     Expiration Date:   6/19/2026    T4, Free     Standing Status:   Future     Expected Date:   6/19/2025     Expiration Date:   6/19/2026    TSH     Standing Status:   Future     Expected Date:   6/19/2025     Expiration Date:   6/19/2026    Urinalysis     Standing Status:   Future     Expected Date:   6/19/2025     Expiration Date:   6/19/2026    Urine Drug Screen     Standing Status:   Future     Expected Date:   6/19/2025     Expiration Date:   6/19/2026    Varicella Zoster Antibody, IgG     Standing Status:   Future     Expected Date:   6/19/2025     Expiration Date:   6/19/2026    Vitamin D 25 Hydroxy     Standing Status:   Future     Expected Date:   6/19/2025     Expiration Date:   6/19/2026    Hemoglobin A1C     Standing Status:   Future     Expected Date:   9/19/2025     Expiration Date:   6/19/2026   Education: discussed and reviewed safe meds, RTO 3 weeks for dating US and initial

## 2025-07-02 ENCOUNTER — OFFICE VISIT (OUTPATIENT)
Dept: DIABETES SERVICES | Age: 25
End: 2025-07-02
Payer: COMMERCIAL

## 2025-07-02 VITALS
BODY MASS INDEX: 27.16 KG/M2 | WEIGHT: 163 LBS | DIASTOLIC BLOOD PRESSURE: 62 MMHG | SYSTOLIC BLOOD PRESSURE: 120 MMHG | HEIGHT: 65 IN | OXYGEN SATURATION: 100 % | HEART RATE: 75 BPM

## 2025-07-02 DIAGNOSIS — E10.9 TYPE 1 DIABETES MELLITUS WITHOUT COMPLICATION (HCC): Primary | ICD-10-CM

## 2025-07-02 DIAGNOSIS — E03.9 HYPOTHYROIDISM, UNSPECIFIED TYPE: ICD-10-CM

## 2025-07-02 PROCEDURE — 1036F TOBACCO NON-USER: CPT | Performed by: NURSE PRACTITIONER

## 2025-07-02 PROCEDURE — 99214 OFFICE O/P EST MOD 30 MIN: CPT | Performed by: NURSE PRACTITIONER

## 2025-07-02 PROCEDURE — G2211 COMPLEX E/M VISIT ADD ON: HCPCS | Performed by: NURSE PRACTITIONER

## 2025-07-02 PROCEDURE — G8427 DOCREV CUR MEDS BY ELIG CLIN: HCPCS | Performed by: NURSE PRACTITIONER

## 2025-07-02 PROCEDURE — G8419 CALC BMI OUT NRM PARAM NOF/U: HCPCS | Performed by: NURSE PRACTITIONER

## 2025-07-02 PROCEDURE — 99212 OFFICE O/P EST SF 10 MIN: CPT | Performed by: NURSE PRACTITIONER

## 2025-07-02 PROCEDURE — 2022F DILAT RTA XM EVC RTNOPTHY: CPT | Performed by: NURSE PRACTITIONER

## 2025-07-02 PROCEDURE — 3046F HEMOGLOBIN A1C LEVEL >9.0%: CPT | Performed by: NURSE PRACTITIONER

## 2025-07-02 PROCEDURE — 95251 CONT GLUC MNTR ANALYSIS I&R: CPT | Performed by: NURSE PRACTITIONER

## 2025-07-02 RX ORDER — GLUCAGON INJECTION, SOLUTION 1 MG/.2ML
1 INJECTION, SOLUTION SUBCUTANEOUS PRN
Qty: 0.2 ML | Refills: 1 | Status: SHIPPED | OUTPATIENT
Start: 2025-07-02

## 2025-07-02 RX ORDER — PROCHLORPERAZINE 25 MG/1
SUPPOSITORY RECTAL
Qty: 1 EACH | Status: SHIPPED | OUTPATIENT
Start: 2025-07-02

## 2025-07-02 RX ORDER — PROCHLORPERAZINE 25 MG/1
SUPPOSITORY RECTAL
Qty: 3 EACH | Refills: 11 | Status: SHIPPED | OUTPATIENT
Start: 2025-07-02

## 2025-07-02 ASSESSMENT — ENCOUNTER SYMPTOMS
SHORTNESS OF BREATH: 0
RESPIRATORY NEGATIVE: 1

## 2025-07-02 NOTE — PROGRESS NOTES
Patient Care Team:  Lo Ibrahim DO as PCP - General (Family Medicine)  Lo Ibrahim DO as PCP - Empaneled Provider    Past DM Medications   MDI     Current Diabetic Medications  Humalog for use with omnipod TDD 60 units     DKA episodes: 2009 DKA with diagnosis after H1N1 vaccine. 2012 DKA unknown cause. 2022 DKA due to illness    History of Present Illness  The patient presents for evaluation of diabetes mellitus and thyroid dysfunction.    The patient is currently utilizing Humalog insulin vials in conjunction with an Omnipod insulin pump. They have been prescribed emergency glucagon, which requires a refill. Additionally, they employ a Dexcom continuous glucose monitoring device to manage their glycemic levels, with efforts directed towards maintaining euglycemia.    The patient is on a daily regimen of Synthroid (levothyroxine) 100 mcg and vitamin D supplementation.    The patient is pregnant, with a positive pregnancy test recorded on 06/07/2025. An ultrasound is scheduled for 07/07/2025. The patient has previously consulted with maternal-fetal medicine specialists.      ICD-10-CM    1. Type 1 diabetes mellitus without complication (HCC)  E10.9 Continuous Glucose Sensor (DEXCOM G6 SENSOR) MISC     Continuous Glucose Transmitter (DEXCOM G6 TRANSMITTER) MISC     Glucagon (GVOKE HYPOPEN 2-PACK) 1 MG/0.2ML SOAJ      2. Hypothyroidism, unspecified type  E03.9           Assessment & Plan  1. Diabetes Mellitus: Stable. OmniPod report: average insulin usage 53 units/day, basal to bolus ratio 60:40, predicted A1c 7.2%, average glucose 161, target glucose 65%, high 29%, very high 5%.  - Administer bolus insulin before meals.  - Adjust active insulin time from 4 hours to 2 hours.  - Increase protein intake to at least 100 g/day.  - Reduce carbohydrate consumption due to potential insulin resistance during pregnancy.  - Prescriptions for Gvoke and Dexcom sensors and transmitter will be sent to

## 2025-07-07 ENCOUNTER — HOSPITAL ENCOUNTER (OUTPATIENT)
Age: 25
Discharge: HOME | End: 2025-07-07
Payer: COMMERCIAL

## 2025-07-07 ENCOUNTER — HOSPITAL ENCOUNTER (OUTPATIENT)
Dept: INTERVENTIONAL RADIOLOGY/VASCULAR | Age: 25
Discharge: HOME | End: 2025-07-09
Payer: COMMERCIAL

## 2025-07-07 ENCOUNTER — HOSPITAL ENCOUNTER (OUTPATIENT)
Age: 25
Setting detail: SPECIMEN
Discharge: HOME | End: 2025-07-07
Payer: COMMERCIAL

## 2025-07-07 ENCOUNTER — ROUTINE PRENATAL (OUTPATIENT)
Dept: OBGYN | Age: 25
End: 2025-07-07
Payer: COMMERCIAL

## 2025-07-07 VITALS
DIASTOLIC BLOOD PRESSURE: 62 MMHG | HEIGHT: 65 IN | WEIGHT: 163 LBS | RESPIRATION RATE: 16 BRPM | SYSTOLIC BLOOD PRESSURE: 108 MMHG | HEART RATE: 80 BPM | BODY MASS INDEX: 27.16 KG/M2

## 2025-07-07 DIAGNOSIS — Z34.90 EARLY STAGE OF PREGNANCY: ICD-10-CM

## 2025-07-07 DIAGNOSIS — Z34.81 PRENATAL CARE, SUBSEQUENT PREGNANCY IN FIRST TRIMESTER: ICD-10-CM

## 2025-07-07 DIAGNOSIS — E55.9 VITAMIN D DEFICIENCY: ICD-10-CM

## 2025-07-07 DIAGNOSIS — E03.9 HYPOTHYROIDISM, UNSPECIFIED TYPE: ICD-10-CM

## 2025-07-07 DIAGNOSIS — O09.899 HISTORY OF PRETERM DELIVERY, CURRENTLY PREGNANT: ICD-10-CM

## 2025-07-07 DIAGNOSIS — Z98.891 PREVIOUS CESAREAN SECTION: ICD-10-CM

## 2025-07-07 DIAGNOSIS — Z3A.09 9 WEEKS GESTATION OF PREGNANCY: Primary | ICD-10-CM

## 2025-07-07 DIAGNOSIS — E10.9 TYPE 1 DIABETES MELLITUS WITHOUT COMPLICATION (HCC): ICD-10-CM

## 2025-07-07 DIAGNOSIS — E10.69 TYPE 1 DIABETES MELLITUS WITH OTHER SPECIFIED COMPLICATION (HCC): ICD-10-CM

## 2025-07-07 LAB
25(OH)D3 SERPL-MCNC: 20.1 NG/ML (ref 30–100)
25(OH)D3 SERPL-MCNC: 20.1 NG/ML (ref 30–100)
ABO + RH BLD: NORMAL
ALBUMIN SERPL-MCNC: 4.7 G/DL (ref 3.5–5.2)
ALBUMIN SERPL-MCNC: 4.7 G/DL (ref 3.5–5.2)
ALBUMIN/GLOB SERPL: 1.7 {RATIO} (ref 1–2.5)
ALBUMIN/GLOB SERPL: 1.7 {RATIO} (ref 1–2.5)
ALP SERPL-CCNC: 55 U/L (ref 35–104)
ALP SERPL-CCNC: 55 U/L (ref 35–104)
ALT SERPL-CCNC: 13 U/L (ref 10–35)
ALT SERPL-CCNC: 13 U/L (ref 10–35)
AMPHET UR QL SCN: NEGATIVE
ANION GAP SERPL CALCULATED.3IONS-SCNC: 12 MMOL/L (ref 9–16)
ANION GAP SERPL CALCULATED.3IONS-SCNC: 12 MMOL/L (ref 9–16)
AST SERPL-CCNC: 16 U/L (ref 10–35)
AST SERPL-CCNC: 16 U/L (ref 10–35)
BACTERIA URNS QL MICRO: ABNORMAL
BARBITURATES UR QL SCN: NEGATIVE
BASOPHILS # BLD: 0.06 K/UL (ref 0–0.2)
BASOPHILS NFR BLD: 1 % (ref 0–2)
BENZODIAZ UR QL: NEGATIVE
BILIRUB SERPL-MCNC: 0.4 MG/DL (ref 0–1.2)
BILIRUB SERPL-MCNC: 0.4 MG/DL (ref 0–1.2)
BILIRUB UR QL STRIP: NEGATIVE
BLOOD GROUP ANTIBODIES SERPL: NEGATIVE
BUN SERPL-MCNC: 10 MG/DL (ref 6–20)
BUN SERPL-MCNC: 10 MG/DL (ref 6–20)
BUN/CREAT SERPL: 20 (ref 9–20)
BUN/CREAT SERPL: 20 (ref 9–20)
CALCIUM SERPL-MCNC: 9.6 MG/DL (ref 8.6–10.4)
CALCIUM SERPL-MCNC: 9.6 MG/DL (ref 8.6–10.4)
CANNABINOIDS UR QL SCN: POSITIVE
CHARACTER UR: ABNORMAL
CHLORIDE SERPL-SCNC: 102 MMOL/L (ref 98–107)
CHLORIDE SERPL-SCNC: 102 MMOL/L (ref 98–107)
CHOLEST SERPL-MCNC: 152 MG/DL (ref 0–199)
CHOLESTEROL/HDL RATIO: 3.2
CLARITY UR: CLEAR
CO2 SERPL-SCNC: 22 MMOL/L (ref 20–31)
CO2 SERPL-SCNC: 22 MMOL/L (ref 20–31)
COCAINE UR QL SCN: NEGATIVE
COLOR UR: YELLOW
CREAT SERPL-MCNC: 0.5 MG/DL (ref 0.6–0.9)
CREAT SERPL-MCNC: 0.5 MG/DL (ref 0.6–0.9)
CREAT UR-MCNC: 35.6 MG/DL (ref 28–217)
EOSINOPHIL # BLD: 0.23 K/UL (ref 0–0.44)
EOSINOPHILS RELATIVE PERCENT: 3 % (ref 1–4)
EPI CELLS #/AREA URNS HPF: ABNORMAL /HPF (ref 0–5)
ERYTHROCYTE [DISTWIDTH] IN BLOOD BY AUTOMATED COUNT: 11.9 % (ref 11.8–14.4)
EST. AVERAGE GLUCOSE BLD GHB EST-MCNC: 134 MG/DL
FENTANYL UR QL: NEGATIVE
GFR, ESTIMATED: >90 ML/MIN/1.73M2
GFR, ESTIMATED: >90 ML/MIN/1.73M2
GLUCOSE SERPL-MCNC: 124 MG/DL (ref 74–99)
GLUCOSE SERPL-MCNC: 124 MG/DL (ref 74–99)
GLUCOSE UR STRIP-MCNC: NEGATIVE MG/DL
HBA1C MFR BLD: 6.3 % (ref 4–6)
HBV SURFACE AG SERPL QL IA: NONREACTIVE
HCT VFR BLD AUTO: 42.6 % (ref 36.3–47.1)
HCV AB SERPL QL IA: NONREACTIVE
HDLC SERPL-MCNC: 47 MG/DL
HGB BLD-MCNC: 14.8 G/DL (ref 11.9–15.1)
HGB UR QL STRIP.AUTO: NEGATIVE
HIV 1+2 AB+HIV1 P24 AG SERPL QL IA: NONREACTIVE
IMM GRANULOCYTES # BLD AUTO: 0.03 K/UL (ref 0–0.3)
IMM GRANULOCYTES NFR BLD: 0 %
KETONES UR STRIP-MCNC: NEGATIVE MG/DL
LDLC SERPL CALC-MCNC: 91 MG/DL (ref 0–100)
LEUKOCYTE ESTERASE UR QL STRIP: ABNORMAL
LYMPHOCYTES NFR BLD: 1.93 K/UL (ref 1.1–3.7)
LYMPHOCYTES RELATIVE PERCENT: 24 % (ref 24–43)
MCH RBC QN AUTO: 29.8 PG (ref 25.2–33.5)
MCHC RBC AUTO-ENTMCNC: 34.7 G/DL (ref 25.2–33.5)
MCV RBC AUTO: 85.9 FL (ref 82.6–102.9)
METHADONE UR QL: NEGATIVE
MICROALBUMIN UR-MCNC: <12 MG/L (ref 0–20)
MICROALBUMIN/CREAT UR-RTO: NORMAL MCG/MG CREAT (ref 0–25)
MONOCYTES NFR BLD: 0.42 K/UL (ref 0.1–1.2)
MONOCYTES NFR BLD: 5 % (ref 3–12)
NEUTROPHILS NFR BLD: 67 % (ref 36–65)
NEUTS SEG NFR BLD: 5.32 K/UL (ref 1.5–8.1)
NITRITE UR QL STRIP: NEGATIVE
NRBC BLD-RTO: 0 PER 100 WBC
OPIATES UR QL SCN: NEGATIVE
OXYCODONE UR QL SCN: NEGATIVE
PCP UR QL SCN: NEGATIVE
PH UR STRIP: 7 [PH] (ref 5–6)
PLATELET # BLD AUTO: 361 K/UL (ref 138–453)
PMV BLD AUTO: 9.4 FL (ref 8.1–13.5)
POTASSIUM SERPL-SCNC: 4.1 MMOL/L (ref 3.7–5.3)
POTASSIUM SERPL-SCNC: 4.1 MMOL/L (ref 3.7–5.3)
PROT SERPL-MCNC: 7.5 G/DL (ref 6.6–8.7)
PROT SERPL-MCNC: 7.5 G/DL (ref 6.6–8.7)
PROT UR STRIP-MCNC: NEGATIVE MG/DL
RBC # BLD AUTO: 4.96 M/UL (ref 3.95–5.11)
RBC #/AREA URNS HPF: ABNORMAL /HPF (ref 0–4)
RUBV IGG SERPL QL IA: 69.2 IU/ML
SODIUM SERPL-SCNC: 136 MMOL/L (ref 136–145)
SODIUM SERPL-SCNC: 136 MMOL/L (ref 136–145)
SP GR UR STRIP: 1.01 (ref 1.01–1.02)
T PALLIDUM AB SER QL IA: NONREACTIVE
T4 FREE SERPL-MCNC: 1.2 NG/DL (ref 0.92–1.68)
T4 FREE SERPL-MCNC: 1.2 NG/DL (ref 0.92–1.68)
TEST INFORMATION: ABNORMAL
TRIGL SERPL-MCNC: 68 MG/DL (ref 0–149)
TSH SERPL DL<=0.05 MIU/L-ACNC: 2.53 UIU/ML (ref 0.27–4.2)
UROBILINOGEN UR STRIP-ACNC: NORMAL EU/DL (ref 0–1)
VLDLC SERPL CALC-MCNC: 14 MG/DL (ref 1–30)
WBC #/AREA URNS HPF: ABNORMAL /HPF (ref 0–4)
WBC OTHER # BLD: 8 K/UL (ref 3.5–11.3)

## 2025-07-07 PROCEDURE — 87086 URINE CULTURE/COLONY COUNT: CPT

## 2025-07-07 PROCEDURE — 84439 ASSAY OF FREE THYROXINE: CPT

## 2025-07-07 PROCEDURE — 87389 HIV-1 AG W/HIV-1&-2 AB AG IA: CPT

## 2025-07-07 PROCEDURE — 99214 OFFICE O/P EST MOD 30 MIN: CPT

## 2025-07-07 PROCEDURE — 87491 CHLMYD TRACH DNA AMP PROBE: CPT

## 2025-07-07 PROCEDURE — 86780 TREPONEMA PALLIDUM: CPT

## 2025-07-07 PROCEDURE — 80307 DRUG TEST PRSMV CHEM ANLYZR: CPT

## 2025-07-07 PROCEDURE — 83036 HEMOGLOBIN GLYCOSYLATED A1C: CPT

## 2025-07-07 PROCEDURE — 84443 ASSAY THYROID STIM HORMONE: CPT

## 2025-07-07 PROCEDURE — 80061 LIPID PANEL: CPT

## 2025-07-07 PROCEDURE — 80053 COMPREHEN METABOLIC PANEL: CPT

## 2025-07-07 PROCEDURE — 76801 OB US < 14 WKS SINGLE FETUS: CPT

## 2025-07-07 PROCEDURE — 86901 BLOOD TYPING SEROLOGIC RH(D): CPT

## 2025-07-07 PROCEDURE — 1036F TOBACCO NON-USER: CPT

## 2025-07-07 PROCEDURE — 86762 RUBELLA ANTIBODY: CPT

## 2025-07-07 PROCEDURE — G8427 DOCREV CUR MEDS BY ELIG CLIN: HCPCS

## 2025-07-07 PROCEDURE — 81001 URINALYSIS AUTO W/SCOPE: CPT

## 2025-07-07 PROCEDURE — 2022F DILAT RTA XM EVC RTNOPTHY: CPT

## 2025-07-07 PROCEDURE — 86787 VARICELLA-ZOSTER ANTIBODY: CPT

## 2025-07-07 PROCEDURE — 82306 VITAMIN D 25 HYDROXY: CPT

## 2025-07-07 PROCEDURE — 87340 HEPATITIS B SURFACE AG IA: CPT

## 2025-07-07 PROCEDURE — 86900 BLOOD TYPING SEROLOGIC ABO: CPT

## 2025-07-07 PROCEDURE — 85025 COMPLETE CBC W/AUTO DIFF WBC: CPT

## 2025-07-07 PROCEDURE — 82570 ASSAY OF URINE CREATININE: CPT

## 2025-07-07 PROCEDURE — 3046F HEMOGLOBIN A1C LEVEL >9.0%: CPT

## 2025-07-07 PROCEDURE — 82043 UR ALBUMIN QUANTITATIVE: CPT

## 2025-07-07 PROCEDURE — 87591 N.GONORRHOEAE DNA AMP PROB: CPT

## 2025-07-07 PROCEDURE — 86850 RBC ANTIBODY SCREEN: CPT

## 2025-07-07 PROCEDURE — 36415 COLL VENOUS BLD VENIPUNCTURE: CPT

## 2025-07-07 PROCEDURE — G8419 CALC BMI OUT NRM PARAM NOF/U: HCPCS

## 2025-07-07 PROCEDURE — 86803 HEPATITIS C AB TEST: CPT

## 2025-07-07 ASSESSMENT — PATIENT HEALTH QUESTIONNAIRE - PHQ9
2. FEELING DOWN, DEPRESSED OR HOPELESS: NOT AT ALL
SUM OF ALL RESPONSES TO PHQ QUESTIONS 1-9: 0
1. LITTLE INTEREST OR PLEASURE IN DOING THINGS: NOT AT ALL
SUM OF ALL RESPONSES TO PHQ QUESTIONS 1-9: 0

## 2025-07-07 NOTE — PROGRESS NOTES
Initial Labs reviewed with patient and purpose, pended. Urine done in lab today.     Initial prenatal folder given, reviewed with patient registration form for [facility], birthing and breastfeeding classes, and map of [facility] campus, reviewed safe medications to take during pregnancy, and phone numbers for office and after hours how to reach nurse midwife on call. Encouraged patient to read rest of information in folder related to birthing, breastfeeding, marijuana use, ect.  Discussed plan of care throughout pregnancy related to when OB ultrasounds would be, blood work and testing for duration of pregnancy, and spoke about panorama test. Instructed patient if she did not want to know gender of baby not to open mychart results.     Discussed with patient that prenatal appointments will be here in [place] office and all babies are delivered at [facility] unless otherwise sent to [place] for MFM or high risk.     Planned pregnancy: No  Mom's Employment: [employer]  Living arrangements: Grandma and Father of Baby  Marital status: No  FOB: Sabas \"Jacob\" Fega  Breast or formula feeding: Breast  Discussed flu/Tdap/RSV/RhoGam: discussed.  Hx diabetes or >10lb baby: Diabetes  Hx thyroid issues: yes  Hx : yes   Hx Colpo or LEEP: no     Headaches: no  Dizziness: no  Nausea: yes  Vomiting: about 1x per week  Bleeding: no  Contractions: no  Loss of Fluids: no

## 2025-07-07 NOTE — PROGRESS NOTES
Prenatal Visit    Andre Diaz is a 24 y.o. female  at 9w3d    Subjective:  The patient was seen and evaluated. Reports Negative fetal movements. She denies abdominal pain, vaginal bleeding and leakage of fluid. Signs and symptoms of  labor as well as labor were reviewed. Dates were reviewed with the patient. Estimated Date of Delivery: 26          The patient undecided the influenza vaccine this year.     The problem list reflects the active issues addressed during today's visit    Andre presents for prenatal visit, she does report nausea most of the day, she does vomit occasionally. She has been taking vitamin b6 this is somewhat helpful. She is able to eat and drink throughout the day.   History of emergency  at 35w1d, son is now 3 years old.     She is a known type I diabetic diagnosed at age of 9. She uses humalog insulin pump and dexcom. She does follow [facility] for diabetic management.     FOB is in the picture and supportive, he is father of older son as well.     She has not seen Maternal fetal medicine yet but a referral has been sent. Will contact them by end of week if she does not hear from them first.     VITALS:    BP: 108/62  Weight - Scale: 73.9 kg (163 lb)  Pulse: 80  Patient Position: Sitting  Fetal HR: 185  Movement: Absent       Assessment & Plan:  Andre Diaz is a 24 y.o. female  at 9w3d   - An 18-22 week anatomy ultrasound has been ordered   - NIPT will be obtained >10 weeks will obtain at next visit    - MSAFP was not ordered.   - Continue taking Prenatal Vitamin.   - The ACIP recommended pregnant patients be included in phase 1C of vaccine distribution. This decision is supported by [facility] and [employer]G. As of 2021, there have been over 30,000 pregnant patients included in the V-safe post COVID vaccination safety . Most (73%) reports to VAERS among pregnant women involved non-pregnancyspecific adverse events (e.g., local and

## 2025-07-08 ENCOUNTER — RESULTS FOLLOW-UP (OUTPATIENT)
Dept: FAMILY MEDICINE CLINIC | Age: 25
End: 2025-07-08

## 2025-07-08 ENCOUNTER — TELEPHONE (OUTPATIENT)
Dept: FAMILY MEDICINE CLINIC | Age: 25
End: 2025-07-08

## 2025-07-08 LAB
CHLAMYDIA DNA UR QL NAA+PROBE: NEGATIVE
MICROORGANISM SPEC CULT: NORMAL
N GONORRHOEA DNA UR QL NAA+PROBE: NEGATIVE
SERVICE CMNT-IMP: NORMAL
SPECIMEN DESCRIPTION: NORMAL
SPECIMEN DESCRIPTION: NORMAL

## 2025-07-08 RX ORDER — ERGOCALCIFEROL 1.25 MG/1
50000 CAPSULE, LIQUID FILLED ORAL WEEKLY
Qty: 12 CAPSULE | Refills: 3 | Status: SHIPPED | OUTPATIENT
Start: 2025-07-08

## 2025-07-09 LAB — VZV IGG SER QL IA: 1.05

## 2025-07-14 RX ORDER — INSULIN LISPRO 100 [IU]/ML
INJECTION, SOLUTION INTRAVENOUS; SUBCUTANEOUS
Qty: 90 ML | Refills: 5 | Status: SHIPPED | OUTPATIENT
Start: 2025-07-14

## 2025-07-30 ENCOUNTER — HOSPITAL ENCOUNTER (OUTPATIENT)
Dept: LAB | Age: 25
Discharge: HOME | End: 2025-07-30
Payer: COMMERCIAL

## 2025-07-30 ENCOUNTER — HOSPITAL ENCOUNTER (OUTPATIENT)
Age: 25
Setting detail: SPECIMEN
Discharge: HOME | End: 2025-07-30

## 2025-07-30 ENCOUNTER — ROUTINE PRENATAL (OUTPATIENT)
Age: 25
End: 2025-07-30
Payer: COMMERCIAL

## 2025-07-30 VITALS
TEMPERATURE: 98.1 F | WEIGHT: 163.8 LBS | BODY MASS INDEX: 27.29 KG/M2 | HEIGHT: 65 IN | RESPIRATION RATE: 16 BRPM | SYSTOLIC BLOOD PRESSURE: 127 MMHG | HEART RATE: 76 BPM | DIASTOLIC BLOOD PRESSURE: 68 MMHG

## 2025-07-30 DIAGNOSIS — Z98.891 HX OF CESAREAN SECTION: ICD-10-CM

## 2025-07-30 DIAGNOSIS — Z34.90 SECOND PREGNANCY: ICD-10-CM

## 2025-07-30 DIAGNOSIS — Z34.90 SECOND PREGNANCY: Primary | ICD-10-CM

## 2025-07-30 DIAGNOSIS — Z3A.12 12 WEEKS GESTATION OF PREGNANCY: ICD-10-CM

## 2025-07-30 DIAGNOSIS — O09.891 HX OF PRETERM DELIVERY, CURRENTLY PREGNANT, FIRST TRIMESTER: ICD-10-CM

## 2025-07-30 PROCEDURE — 76801 OB US < 14 WKS SINGLE FETUS: CPT | Performed by: OBSTETRICS & GYNECOLOGY

## 2025-07-30 PROCEDURE — 99202 OFFICE O/P NEW SF 15 MIN: CPT | Performed by: OBSTETRICS & GYNECOLOGY

## 2025-07-30 PROCEDURE — 76813 OB US NUCHAL MEAS 1 GEST: CPT | Performed by: OBSTETRICS & GYNECOLOGY

## 2025-07-30 NOTE — PROGRESS NOTES
[facility] MATERNAL FETAL MED  [address]  [place] [address]  Dept: [phone]     2025    RE:  Andre Diaz     : 2000   AGE: 24 y.o.     Dear Dr. Tejeda,    Thank you for allowing me to provide prenatal consultation for Andre Diaz.  As I'm sure you will recall, Andre Diaz is a 24 y.o. [id number]'s last menstrual period was 05/15/2025 (exact date). 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    Second pregnancy 2025    12 weeks gestation of pregnancy 2025    Hx of  section 2025    Hx of  delivery, currently pregnant, first trimester 2025    Type 1 diabetes mellitus with other specified complication (HCC) 2024    DKA, type 1, not at goal (HCC) 2024    Hypothyroidism 2021    Celiac disease 2019    Weight gain due to medication 04/15/2015    Hyperopia of both eyes with astigmatism 2014    Migraine with aura 2014    Chronic headaches 2014    Sleep disturbance 2014    Dysmenorrhea 2013    Diabetes mellitus type I (HCC)     Chronic lymphocytic thyroiditis    Nuchal translucency    PAST HISTORY:  OB History    Para Term  AB Living   2 1 0 1 0 1   SAB IAB Ectopic Molar Multiple Live Births   0 0 0 0 0 1      # Outcome Date GA Lbr Pancho/2nd Weight Sex Type Anes PTL Lv   2 Current            1  22 35w1d  4.11 kg (9 lb 1 oz) M  Spinal Y VÍCTOR          MEDICAL:  Past Medical History:   Diagnosis Date    Abdominal pain     Abnormal stools     Acute kidney injury superimposed on CKD 10/24/2022    Celiac disease

## 2025-08-04 ENCOUNTER — ROUTINE PRENATAL (OUTPATIENT)
Dept: OBGYN | Age: 25
End: 2025-08-04
Payer: COMMERCIAL

## 2025-08-04 VITALS
DIASTOLIC BLOOD PRESSURE: 78 MMHG | SYSTOLIC BLOOD PRESSURE: 110 MMHG | WEIGHT: 161.25 LBS | HEART RATE: 88 BPM | BODY MASS INDEX: 26.63 KG/M2

## 2025-08-04 DIAGNOSIS — Z34.91 PRENATAL CARE, FIRST TRIMESTER: ICD-10-CM

## 2025-08-04 DIAGNOSIS — Z98.891 HX OF CESAREAN SECTION: ICD-10-CM

## 2025-08-04 DIAGNOSIS — Z3A.13 13 WEEKS GESTATION OF PREGNANCY: Primary | ICD-10-CM

## 2025-08-04 DIAGNOSIS — E03.9 HYPOTHYROIDISM, UNSPECIFIED TYPE: ICD-10-CM

## 2025-08-04 DIAGNOSIS — K90.0 CELIAC DISEASE: ICD-10-CM

## 2025-08-04 DIAGNOSIS — E10.69 TYPE 1 DIABETES MELLITUS WITH OTHER SPECIFIED COMPLICATION (HCC): ICD-10-CM

## 2025-08-04 DIAGNOSIS — O09.891 HX OF PRETERM DELIVERY, CURRENTLY PREGNANT, FIRST TRIMESTER: ICD-10-CM

## 2025-08-04 PROCEDURE — 3044F HG A1C LEVEL LT 7.0%: CPT

## 2025-08-04 PROCEDURE — G8427 DOCREV CUR MEDS BY ELIG CLIN: HCPCS

## 2025-08-04 PROCEDURE — 2022F DILAT RTA XM EVC RTNOPTHY: CPT

## 2025-08-04 PROCEDURE — G8419 CALC BMI OUT NRM PARAM NOF/U: HCPCS

## 2025-08-04 PROCEDURE — 1036F TOBACCO NON-USER: CPT

## 2025-08-04 PROCEDURE — 99214 OFFICE O/P EST MOD 30 MIN: CPT

## 2025-08-13 ENCOUNTER — ROUTINE PRENATAL (OUTPATIENT)
Age: 25
End: 2025-08-13
Payer: COMMERCIAL

## 2025-08-13 VITALS
BODY MASS INDEX: 27.32 KG/M2 | TEMPERATURE: 98.3 F | HEIGHT: 65 IN | DIASTOLIC BLOOD PRESSURE: 74 MMHG | RESPIRATION RATE: 16 BRPM | WEIGHT: 164 LBS | HEART RATE: 89 BPM | SYSTOLIC BLOOD PRESSURE: 124 MMHG

## 2025-08-13 DIAGNOSIS — Z3A.14 14 WEEKS GESTATION OF PREGNANCY: ICD-10-CM

## 2025-08-13 DIAGNOSIS — Z34.90 SECOND PREGNANCY: ICD-10-CM

## 2025-08-13 DIAGNOSIS — Z98.891 HX OF CESAREAN SECTION: ICD-10-CM

## 2025-08-13 DIAGNOSIS — Z14.1 CYSTIC FIBROSIS CARRIER: ICD-10-CM

## 2025-08-13 DIAGNOSIS — E10.10 DKA, TYPE 1, NOT AT GOAL (HCC): ICD-10-CM

## 2025-08-13 DIAGNOSIS — O09.892 HX OF PRETERM DELIVERY, CURRENTLY PREGNANT, SECOND TRIMESTER: Primary | ICD-10-CM

## 2025-08-13 DIAGNOSIS — K90.0 CELIAC DISEASE: ICD-10-CM

## 2025-08-13 PROCEDURE — 1036F TOBACCO NON-USER: CPT | Performed by: OBSTETRICS & GYNECOLOGY

## 2025-08-13 PROCEDURE — 99213 OFFICE O/P EST LOW 20 MIN: CPT | Performed by: OBSTETRICS & GYNECOLOGY

## 2025-08-13 PROCEDURE — 3044F HG A1C LEVEL LT 7.0%: CPT | Performed by: OBSTETRICS & GYNECOLOGY

## 2025-08-13 PROCEDURE — 2022F DILAT RTA XM EVC RTNOPTHY: CPT | Performed by: OBSTETRICS & GYNECOLOGY

## 2025-08-13 PROCEDURE — G8419 CALC BMI OUT NRM PARAM NOF/U: HCPCS | Performed by: OBSTETRICS & GYNECOLOGY

## 2025-08-13 PROCEDURE — G8428 CUR MEDS NOT DOCUMENT: HCPCS | Performed by: OBSTETRICS & GYNECOLOGY

## 2025-08-13 PROCEDURE — 99212 OFFICE O/P EST SF 10 MIN: CPT | Performed by: OBSTETRICS & GYNECOLOGY

## 2025-08-26 ENCOUNTER — ROUTINE PRENATAL (OUTPATIENT)
Dept: OBGYN | Age: 25
End: 2025-08-26
Payer: COMMERCIAL

## 2025-08-26 ENCOUNTER — HOSPITAL ENCOUNTER (OUTPATIENT)
Dept: LAB | Age: 25
Discharge: HOME | End: 2025-08-26
Payer: COMMERCIAL

## 2025-08-26 VITALS
SYSTOLIC BLOOD PRESSURE: 122 MMHG | BODY MASS INDEX: 27.09 KG/M2 | HEART RATE: 82 BPM | DIASTOLIC BLOOD PRESSURE: 74 MMHG | WEIGHT: 164 LBS

## 2025-08-26 DIAGNOSIS — O09.891 HX OF PRETERM DELIVERY, CURRENTLY PREGNANT, FIRST TRIMESTER: ICD-10-CM

## 2025-08-26 DIAGNOSIS — E03.9 HYPOTHYROIDISM AFFECTING PREGNANCY, ANTEPARTUM: ICD-10-CM

## 2025-08-26 DIAGNOSIS — E10.69 TYPE 1 DIABETES MELLITUS WITH OTHER SPECIFIED COMPLICATION (HCC): ICD-10-CM

## 2025-08-26 DIAGNOSIS — K90.0 CELIAC DISEASE: ICD-10-CM

## 2025-08-26 DIAGNOSIS — Z98.891 HX OF CESAREAN SECTION: ICD-10-CM

## 2025-08-26 DIAGNOSIS — Z34.92 PRENATAL CARE, SECOND TRIMESTER: ICD-10-CM

## 2025-08-26 DIAGNOSIS — Z34.92 PRENATAL CARE, SECOND TRIMESTER: Primary | ICD-10-CM

## 2025-08-26 DIAGNOSIS — Z3A.16 16 WEEKS GESTATION OF PREGNANCY: ICD-10-CM

## 2025-08-26 DIAGNOSIS — O99.280 HYPOTHYROIDISM AFFECTING PREGNANCY, ANTEPARTUM: ICD-10-CM

## 2025-08-26 DIAGNOSIS — Z34.90 EARLY STAGE OF PREGNANCY: ICD-10-CM

## 2025-08-26 LAB
EST. AVERAGE GLUCOSE BLD GHB EST-MCNC: 117 MG/DL
HBA1C MFR BLD: 5.7 % (ref 4–6)
T4 FREE SERPL-MCNC: 1.1 NG/DL (ref 0.92–1.68)
TSH SERPL DL<=0.05 MIU/L-ACNC: 2.21 UIU/ML (ref 0.27–4.2)

## 2025-08-26 PROCEDURE — 82105 ALPHA-FETOPROTEIN SERUM: CPT

## 2025-08-26 PROCEDURE — 83036 HEMOGLOBIN GLYCOSYLATED A1C: CPT

## 2025-08-26 PROCEDURE — 2022F DILAT RTA XM EVC RTNOPTHY: CPT | Performed by: ADVANCED PRACTICE MIDWIFE

## 2025-08-26 PROCEDURE — 84443 ASSAY THYROID STIM HORMONE: CPT

## 2025-08-26 PROCEDURE — G8419 CALC BMI OUT NRM PARAM NOF/U: HCPCS | Performed by: ADVANCED PRACTICE MIDWIFE

## 2025-08-26 PROCEDURE — G8428 CUR MEDS NOT DOCUMENT: HCPCS | Performed by: ADVANCED PRACTICE MIDWIFE

## 2025-08-26 PROCEDURE — 99214 OFFICE O/P EST MOD 30 MIN: CPT | Performed by: ADVANCED PRACTICE MIDWIFE

## 2025-08-26 PROCEDURE — 36415 COLL VENOUS BLD VENIPUNCTURE: CPT

## 2025-08-26 PROCEDURE — 1036F TOBACCO NON-USER: CPT | Performed by: ADVANCED PRACTICE MIDWIFE

## 2025-08-26 PROCEDURE — 84439 ASSAY OF FREE THYROXINE: CPT

## 2025-08-26 PROCEDURE — 3044F HG A1C LEVEL LT 7.0%: CPT | Performed by: ADVANCED PRACTICE MIDWIFE

## 2025-08-29 LAB
AFP INTERPRETATION: NORMAL
AFP MOM: 0.82
AFP SPECIMEN: NORMAL
AFP: 23 NG/ML
DATE OF BIRTH: NORMAL
DATING METHOD: NORMAL
DETERMINED BY: NORMAL
DIABETIC: NEGATIVE
DONOR EGG?: NORMAL
DUE DATE: NORMAL
ESTIMATED DUE DATE: NORMAL
FAMILY HISTORY NTD: NEGATIVE
GESTATIONAL AGE: NORMAL
IN VITRO FERTILIZATION: NORMAL
INSULIN REQ DIABETES: YES
LAST MENSTRUAL PERIOD: NORMAL
MATERNAL AGE AT EDD: 24.3 YR
MATERNAL WEIGHT: 164
MONOCHORIONIC TWINS: NORMAL
NUMBER OF FETUSES: NORMAL
PATIENT WEIGHT UNITS: NORMAL
PATIENT WEIGHT: NORMAL
RACE (MATERNAL): NORMAL
RACE: NORMAL
REPEAT SPECIMEN?: NORMAL
SMOKING: NORMAL
SMOKING: NORMAL
VALPROIC/CARBAMAZEP: NORMAL
ZZ NTE CLEAN UP: HISTORY: NO